# Patient Record
(demographics unavailable — no encounter records)

---
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
## 2018-09-20 NOTE — PHYS DOC
Past Medical History


Past Medical History:  Diabetes-Type I


Additional Past Medical Histor:  DIABETIC RIGHT FOOT ULCER


Past Surgical History:  Other


Additional Past Surgical Histo:  right rotator cuff, right ankle, right foot 

ulcer


Alcohol Use:  Occasionally


Drug Use:  None





Adult General


Chief Complaint


Chief Complaint:  FOOT INJURY PAIN





[facility]


HPI





Patient is a 39 year old  male who presents to the ED for wound 

evaluation. He states that his right foot started throbbing about 6 days ago. 

At the time, he had been taking gabapentin for nerve pain. He stopped taking 

the gabapentin and the throbbing stopped. He came to the ED to get his foot 

evaluated "just in case". He has a history of prior foot infection of his right 

1st toe and the plantar aspect of his foot. He states he is able to go and get 

wound care once a week. He states his blood sugar levels run around mid-200s. 

He currently denies any symptoms.





Review of Systems


Review of Systems





Constitutional: Denies fever or chills


Eyes: Denies change in visual acuity, redness, or eye pain


HENT: Denies nasal congestion or sore throat


Respiratory: Denies cough or shortness of breath


Cardiovascular: No additional information not addressed in HPI


GI: Denies abdominal pain, nausea, vomiting, diarrhea, constipation


: Denies dysuria or hematuria


Musculoskeletal: Denies back pain; Notes slight right foot pain


Integument: Denies rash or skin lesions


Neurologic: Denies headache, focal weakness or sensory changes





Complete systems were reviewed and found to be within normal limits, except as 

documented in this note.





Family History


Family History


Non-contributory





Allergies


Allergies





Allergies








Coded Allergies Type Severity Reaction Last Updated Verified


 


  piperacillin Allergy Intermediate  4/14/18 Yes


 


  tazobactam Allergy Intermediate  4/14/18 Yes











Physical Exam


Physical Exam





Constitutional: Well developed, well nourished, no acute distress, non-toxic 

appearance


HENT: Normocephalic, atraumatic, oropharynx moist, no oral exudates, nose normal


Eyes: Conjunctiva normal, no discharge


Neck: Normal range of motion, no tenderness, supple, no meningismus


Cardiovascular: Heart rate regular rhythm, no murmur


Lungs & Thorax:  Bilateral breath sounds clear to auscultation


Abdomen: Soft, no tenderness


Skin: Warm, dry, no erythema, no rash


Back: No tenderness, no CVA tenderness


Extremities: No tenderness, ROM intact, healing wound on right 1st toe and 

plantar aspect of right foot


Neurologic: Alert and oriented X 3, normal motor function, normal sensory 

function, no focal deficits noted


Psychologic: Affect normal, judgement normal, mood normal





Current Patient Data


Vital Signs





 Vital Signs








  Date Time  Temp Pulse Resp B/P (MAP) Pulse Ox O2 Delivery O2 Flow Rate FiO2


 


9/20/18 03:10 98.2 107 18 176/86 (116) 96 Room Air  





 98.2       











EKG


EKG


[]





Radiology/Procedures


Radiology/Procedures


[]





Course & Med Decision Making


Course & Med Decision Making





Patient is a 39 year old  male who presents to the ED for wound 

evaluation. States pain in his foot stopped when he discontinued his 

gabapentin. Pertinent labs and imaging studies obtained and reviewed (see chart 

for details). Recommended continued discontinuation of gabapentin. Patient 

provided with "post-op" shoe for patient comfort. He was also provided with a 

tramadol prescription for his pain symptoms. Recommended that he continue to 

see wound care once a week for his healing foot infection. Patient will also be 

provided with a note that states he is fit to return to work. Patient stable 

for discharge with outpatient follow-up with PCP. Discussed findings and plan 

with patient, who acknowledges understanding and agreement.





Dragon Disclaimer


Dragon Disclaimer


This electronic medical record was generated, in whole or in part, using a 

voice recognition dictation system.





Departure


Departure


Impression:  


 Primary Impression:  


 Encounter for evaluation of wound


Disposition:  01 HOME, SELF-CARE


Condition:  STABLE


Referrals:  


UNKNOWN PCP NAME (PCP)


Patient Instructions:  Wound Check


Scripts


Tramadol Hcl (TRAMADOL HCL) 50 Mg Tablet


50 MG PO Q6HRS PRN for PAIN, #14 TAB


   Take each tablet with one (1) regular strength Tylenol


   325mg.


   Prov: CHALINO SOTELO DO         9/20/18











CHALINO SOTELO DO Sep 20, 2018 03:54

## 2020-08-04 NOTE — EKG
[facility]

              [address] [place] [address]

Test Date:    2020               Test Time:    08:57:18

Pat Name:     CATALINO LANGE               Department:   

Patient ID:   [id number]           Room:          

Gender:       M                        Technician:   

:          1978               Requested By: JAVIER YODER

Order Number: [id number]           Reading MD:     

                                 Measurements

Intervals                              Axis          

Rate:         107                      P:            47

NV:           152                      QRS:          -20

QRSD:         84                       T:            58

QT:           328                                    

QTc:          443                                    

                           Interpretive Statements

SINUS TACHYCARDIA

LEFTWARD AXIS

R-S TRANSITION ZONE IN V LEADS DISPLACED TO THE LEFT

QRS(T) CONTOUR ABNORMALITY

CONSIDER ANTEROSEPTAL MYOCARDIAL DAMAGE

POSSIBLY ABNORMAL ECG

RI6.01

No previous ECG available for comparison

## 2020-08-04 NOTE — CONS
DATE OF CONSULTATION:  08/04/2020



PULMONARY CONSULTATION



ATTENDING PHYSICIAN:  Dr. Allen.



REASON FOR CONSULTATION:  COVID pneumonia.



HISTORY OF PRESENT ILLNESS:  The patient is a 41-year-old morbidly obese male

with a BMI of 51.  The patient has history of hypertension, diabetes and morbid

obesity.  He was brought into the hospital complaining of shortness of breath. 

This has been going on for 5 days.  He was tested positive for COVID.  He has a

cough, which has been nonproductive.  No headaches, no nausea, vomiting, no

diarrhea, no dysuria, no focal weakness.  He was seen in the Emergency Room, was

hospitalized.  He had a CTA chest.  The contrast was a poor as a result was

nondiagnostic for pulmonary embolism.  He did have multifocal ground glass

opacities bilaterally predominantly in the left upper and lower lobes and

concerning for COVID pneumonia.  He is currently requiring oxygen at 2 liters. 

His T-max is 100.2.  I have been asked to see him for further evaluation.



PAST MEDICAL HISTORY:  Obesity, diabetes, hypertension, diabetic foot ulcer,

rotator cuff surgery and ankle surgery.



PAST SURGICAL HISTORY:  As above.



ALLERGIES:  None.



FAMILY HISTORY:  Diabetes.



SOCIAL HISTORY:  He is an .  He does not drink or smoke

cigarettes.



MEDICATIONS:  Reviewed as listed in the MRAD.



ALLERGIES:  PIPERACILLIN AND TAZOBACTAM.



REVIEW OF SYSTEMS:  Twelve-point system obtained.  Pertinent positives discussed

in my history of present illness, otherwise noncontributory.  All systems that

were negative were reviewed as well.



FAMILY HISTORY:  Noncontributory to lungs.



PHYSICAL EXAMINATION:

VITAL SIGNS:  Reviewed.  His T-max is 100.2, pulse ox 92% on 2 liters, blood

pressure on the high side 181/91.

GENERAL:  He is in no obvious respiratory distress.

SKIN:  With no rash.  Visual exam done via telemedicine.



LABORATORY DATA:  Reviewed.  White cell count 7.1, hemoglobin 13.3 and platelets

are 149.  BUN 25, creatinine 1.7.



IMPRESSION:

1.  Acute hypoxic respiratory failure secondary to COVID-19 pneumonia.

2.  Abnormal CT chest consistent with pneumonia with ground glass opacities

bilaterally, poor contrast was received as a result pulmonary embolism could not

be ruled out, but he has low clinical suspicion for pulmonary embolism and as

such no further investigation is needed.

3.  Underlying morbid obesity.

4.  Diabetes.

5.  Hypertension.



RECOMMENDATIONS:

1.  Continue with present oxygen and keep saturations 92-94.

2.  Infectious Disease has been consulted for antibiotics.  HE IS ALLERGIC TO

PIPERACILLIN AND TAZOBACTAM.

3.  Continue IV steroids due to hypoxia.

4.  Weight loss is advised.

5.  We will monitor the clinical course of COVID pneumonia.

6.  Discussed with RN.  We will follow along with you.

 



______________________________

AZAM REAGAN MD



DR:  DALLIN/madison  JOB#:  [id number] / [id number]

DD:  08/04/2020 19:59  DT:  08/04/2020 20:15

## 2020-08-04 NOTE — NUR
Pt arrived on unit from ED by wheelchair at 1440. Pt currently on 2L NC, having no pain at 
this time. Pt requesting water, fresh pitcher given. No additional concerns. Will assume 
care of this pt and monitor closely.

## 2020-08-04 NOTE — HP
ADMIT DATE:  08/04/2020



CHIEF COMPLAINT:  Shortness of breath.



HISTORY OF PRESENT ILLNESS:  The patient is a pleasant middle-aged

-American male who is an .  Basically, he was checked

for COVID about 5 days ago and apparently he is positive.  He has been having

increasing shortness of breath and fatigue.  His symptoms are worse with

exertion, better with rest.  He has had some blood-streaked sputum.  His

appetite is decreased.  I discussed the case with ER physician.  The patient is

now hypoxic.  We are going to admit the patient and consult Pulmonary Medicine,

Infectious Disease.



PAST MEDICAL HISTORY:  Obesity, diabetes, hypertension, diabetic foot ulcer,

rotator cuff surgery, ankle surgery.



ALLERGIES:  None.



FAMILY HISTORY:  Diabetes.



SOCIAL HISTORY:  He is an .  He does not drink, smoke or take

drugs.



MEDICATIONS:  Reviewed, please refer to the MRAD.



REVIEW OF SYSTEMS:  

GENERAL:  No history of weight change, weakness or fevers.

SKIN:  No bruising, hair changes or rashes.

EYES:  No blurred, double or loss of vision.

NOSE AND THROAT:  No history of nosebleeds, hoarseness or sore throat.

HEART:  No history of palpitations, chest pain or shortness of breath on

exertion.

LUNGS:  He complains of shortness of breath and cough.

GASTROINTESTINAL:  Denies changes in appetite, nausea, vomiting, diarrhea or

constipation.

GENITOURINARY:  No history of frequency, urgency, hesitancy or nocturia.

NEUROLOGIC:  Denies history of numbness, tingling, tremor or weakness.

PSYCHIATRIC:  No history of panic, anxiety or depression.

ENDOCRINE:  No history of heat or cold intolerance, polyuria or polydipsia.

EXTREMITIES:  Denies muscle weakness, joint pain, pain on walking or stiffness.

 

PHYSICAL EXAMINATION:

VITALS:  Within normal limits and are stable.

GENERAL:  No apparent distress.  Alert and oriented.

HEENT:  Normal cephalic atraumatic, external auditory canals are patent

EYES:  Extraocular muscles are intact, pupils are equally round and reactive to

light and accommodation

MUSCULOSKELETAL:  Well developed, well nourished, good range of motion

ENDOCRINE:  No thyromegaly was palpated

LYMPHATICS:  No cervical chain or axillary nodes were noted

HEMATOPOIETIC:  No bruising

NECK:  Supple, no JVD, no thyromegaly was noted.

LUNGS:  He has got decreased breath sounds with some fine crackles.

HEART:  RRR, S1, S2 present.  Peripheral pulses intact, no obvious murmurs were

noted.

ABDOMEN:  Soft, nontender.  Positive bowel sounds no organomegaly, normal bowel

sounds.

EXTREMITIES:  Without any cyanosis, clubbing, or edema.  Pedal pulses intact,

Homans sign is negative.

NEUROLOGIC:  Normal speech, normal tone.  A & O x3, moves all extremities, no

obvious focal deficits.

PSYCHIATRIC:  Normal affect, normal mood.  Stable.

SKIN:  No ulcerations or rashes, good skin turgor, no jaundice.

VASCULAR:  Good capillary refill, neurovascular bundle appears to be intact.



LABORATORY DATA:  Pending.  CT of the chest did not show any obvious PE, but

apparently the timing was a little off with a bolus of contrast.



ASSESSMENT AND PLAN:  COVID-19.  The patient will be admitted to the COVID unit.

 Respiratory isolation, IV steroids, IV antibiotics,  albuterol, O2 per nasal

cannula.  Home meds, DVT prophylaxis.  Consult Pulmonary, consult Infectious

Disease.  Trend labs.

 



______________________________

JAIRO BUSCH DO



DR:  MIRIAM/madison  JOB#:  [id number] / [id number]

DD:  08/04/2020 15:45  DT:  08/04/2020 16:05

## 2020-08-04 NOTE — NUR
This RN received telephone orders from Dr. Franco for pt's elevated BP, elevated 
temperature, and insulin orders. Refer to orders for details.

## 2020-08-04 NOTE — RAD
CHEST AP ONLY

 

History: Reason: sob COVID + / Spl. Instructions:  / History: 

 

Comparison: July 29, 2020

 

Findings:

Multifocal consolidations bilaterally, left greater than right. No pleural

effusion. No pneumothorax. Normal heart size.

 

Impression: 

1.  Multifocal consolidations bilaterally, left greater than right. 

Differential considerations include infectious process such as viral 

pneumonia, ARDS or asymmetric pulmonary edema.

 

Electronically signed by: Carlyle Miranda DO (8/4/2020 11:04 AM) [facility]

## 2020-08-04 NOTE — PHYS DOC
Past Medical History


Past Medical History:  Diabetes-Type II, Hypertension


Additional Past Medical Histor:  DIABETIC RIGHT FOOT ULCER


Past Surgical History:  Other


Additional Past Surgical Histo:  RIGHT FOOT, ankle, rotator cuff


Smoking Status:  Never Smoker


Alcohol Use:  Occasionally


Drug Use:  None





General Adult


EDM:


Chief Complaint:  SHORTNESS OF BREATH





HPI:


HPI:





The history was obtained from the patient.  Patient is a 41-year-old male with 

PMH obesity, insulin-dependent diabetes who presents with a chief complaint of 

generalized fatigue and shortness of breath.  Patient states he was diagnosed 

with coronavirus approximately 5 days ago.  He states he is felt increasingly 

fatigued since then.  He does note some shortness of breath at rest and with 

exertion.  He denies any chest pain.  He does note a productive cough.  He does 

note some blood-streaked sputum.  He notes a decrease in appetite.  He denies 

any vomiting.  He states that he thinks he may be dehydrated.  He denies any 

history of sleep apnea.  He states blood sugars have been running approximately 

200.  He states this is not unusual for him however.  He denies any neck pain or

stiffness.  He denies any syncope.  He does not use oxygen at home.  No other 

complaints.





Review of Systems:


Review of Systems:


Constitutional:   Positive for fevers and chills, fatigue


Eyes:   Denies change in visual acuity. []


HENT:   Denies nasal congestion or sore throat. [] 


Respiratory:   Positive for cough, hemoptysis, shortness of breath


Cardiovascular:   Denies chest pain or edema. [] 


GI:   Denies abdominal pain, nausea, vomiting, bloody stools or diarrhea. [] 


:  Denies dysuria. [] 


Musculoskeletal:   Denies back pain or joint pain. [] 


Integument:   Denies rash. [] 


Neurologic:   Denies headache, focal weakness or sensory changes. [] 


Endocrine:   Denies polyuria or polydipsia. [] 


Lymphatic:  Denies swollen glands. [] 


Psychiatric:  Denies depression or anxiety. []





Heart Score:


Risk Factors:


Risk Factors:  DM, Current or recent (<one month) smoker, HTN, HLP, family 

history of CAD, obesity.


Risk Scores:


Score 0 - 3:  2.5% MACE over next 6 weeks - Discharge Home


Score 4 - 6:  20.3% MACE over next 6 weeks - Admit for Clinical Observation


Score 7 - 10:  72.7% MACE over next 6 weeks - Early Invasive Strategies





Current Medications:





Current Medications








 Medications


  (Trade)  Dose


 Ordered  Sig/Toni  Start Time


 Stop Time Status Last Admin


Dose Admin


 


 Acetaminophen


  (Tylenol)  1,000 mg  1X  ONCE  20 08:45


 20 08:46 UNV  





 


 Ondansetron HCl


  (Zofran)  4 mg  1X  ONCE  20 08:45


 20 08:46 UNV  





 


 Sodium Chloride  1,000 ml @ 


 1,000 mls/hr  1X  ONCE  20 08:45


 20 09:44 UNV  














Allergies:


Allergies:





Allergies








Coded Allergies Type Severity Reaction Last Updated Verified


 


  piperacillin Allergy Intermediate K 20 Yes


 


  tazobactam Allergy Intermediate K 20 Yes











Physical Exam:


PE:





Constitutional: Well developed, well nourished, no acute distress, non-toxic 

appearance. []


HENT: Normocephalic, atraumatic, bilateral external ears normal, oropharynx 

moist, no oral exudates, nose normal. []


Eyes: PERRLA, EOMI, conjunctiva normal, no discharge. [] 


Neck: Normal range of motion, no tenderness, supple, no stridor. [] 


Cardiovascular:Heart rate regular rhythm, no murmur []


Lungs & Thorax:  Bilateral breath sounds clear to auscultation []


Abdomen:  soft, no tenderness, no masses, no pulsatile masses. [] 


Skin: Warm, dry, no erythema, no rash. [] 


Back: No tenderness, no CVA tenderness. [] 


Extremities: No tenderness, no cyanosis, no clubbing, ROM intact, no edema. [] 


Neurologic: Alert and oriented X 3, normal motor function, normal sensory 

function, no focal deficits noted. []


Psychologic: Affect normal, judgement normal, mood normal. []





Current Patient Data:


Labs:





Laboratory Tests








Test


 20


09:15


 


White Blood Count 7.1 x10^3/uL 


 


Red Blood Count 5.03 x10^6/uL 


 


Hemoglobin 13.3 g/dL 


 


Hematocrit 39.7 % 


 


Mean Corpuscular Volume 79 fL 


 


Mean Corpuscular Hemoglobin 26 pg 


 


Mean Corpuscular Hemoglobin


Concent 33 g/dL 





 


Red Cell Distribution Width 14.4 % 


 


Platelet Count 149 x10^3/uL 


 


Neutrophils (%) (Auto) 83 % 


 


Lymphocytes (%) (Auto) 11 % 


 


Monocytes (%) (Auto) 6 % 


 


Eosinophils (%) (Auto) 0 % 


 


Basophils (%) (Auto) 0 % 


 


Neutrophils # (Auto) 5.9 x10^3/uL 


 


Lymphocytes # (Auto) 0.8 x10^3/uL 


 


Monocytes # (Auto) 0.4 x10^3/uL 


 


Eosinophils # (Auto) 0.0 x10^3/uL 


 


Basophils # (Auto) 0.0 x10^3/uL 


 


Prothrombin Time 12.2 SEC 


 


Prothromb Time International


Ratio 0.9 





 


Sodium Level 130 mmol/L 


 


Potassium Level 3.6 mmol/L 


 


Chloride Level 94 mmol/L 


 


Carbon Dioxide Level 30 mmol/L 


 


Anion Gap 6 


 


Blood Urea Nitrogen 25 mg/dL 


 


Creatinine 1.7 mg/dL 


 


Estimated GFR


(Cockcroft-Gault) 54.0 





 


Glucose Level 216 mg/dL 


 


Calcium Level 7.7 mg/dL 


 


Troponin I Quantitative < 0.017 ng/mL 


 


NT-Pro-B-Type Natriuretic


Peptide 104 pg/mL 











Current Medications








 Medications


  (Trade)  Dose


 Ordered  Sig/Toni


 Route


 PRN Reason  Start Time


 Stop Time Status Last Admin


Dose Admin


 


 Sodium Chloride  1,000 ml @ 


 1,000 mls/hr  1X  ONCE


 IV


   20 08:45


 20 09:44 DC 20 09:21





 


 Ondansetron HCl


  (Zofran)  4 mg  1X  ONCE


 IVP


   20 08:45


 20 08:47 DC 20 09:21





 


 Acetaminophen


  (Tylenol)  1,000 mg  1X  ONCE


 PO


   20 08:45


 20 08:47 DC 20 09:21





 


 Iohexol


  (Omnipaque 350


 Mg/ml)  80 ml  1X  ONCE


 IV


   20 10:15


 20 10:16 DC 20 10:30





 


 Info


  (CONTRAST GIVEN


 -- Rx MONITORING)  1 each  PRN DAILY  PRN


 MC


 SEE COMMENTS  20 10:15


 20 10:14   





 


 Ondansetron HCl


  (Zofran)  4 mg  PRN Q8HRS  PRN


 IV


 NAUSEA/VOMITING  20 11:45


 20 11:44   





 


 Methylprednisolone


 Sodium Succinate


  (SOLU-Medrol


 40MG VIAL)  40 mg  BID


 IM


   8/4/20 21:00


     





 


 Multivitamins


  (Thera M Plus)  1 tab  DAILY


 PO


   20 09:00


     











Vital Signs:





Vital Signs








  Date Time  Temp Pulse Resp B/P (MAP) Pulse Ox O2 Delivery O2 Flow Rate FiO2


 


20 11:21  103  144/62 (89) 94 Nasal Cannula 2.0 


 


20 09:51   18     


 


20 08:45 98.8       





 98.8       











EKG:


EKG:


[] EKG consistent with sinus tachycardia.  Ventricular rate of 107 bpm.  Left 

axis noted.  Intervals normal.  No acute ischemic changes appreciated.  No 

previous EKG for comparison.





Radiology/Procedures:


Radiology/Procedures:


[][facility]


                    [address]  [place], [place] [id number] [phone]


                                        


                                 IMAGING REPORT





                                     Signed





PATIENT: CATALINO LANGECCOUNT: [id number]     MRN#: [id number]


: 1978           LOCATION: ER              AGE: 41


SEX: M                    EXAM DT: 20         ACCESSION#: [id number]


STATUS: REG ER            ORD. PHYSICIAN: JAVIRE YODER DO


REASON: hemoptysis. r/o PE


PROCEDURE: CT ANGIOGRAPHY CHEST





CT ANGIOGRAPHY CHEST


 


History: Hemoptysis


 


Technique: CT of the chest was performed with intravenous contrast. PE 


protocol. Maximum intensity projection coronal and sagittal 


reconstructions were performed.


 


Exposure: One or more of the following individualized dose reduction 


techniques were utilized for this examination:  


1. Automated exposure control  


2. Adjustment of the mA and/or kV according to patient size  


3. Use of iterative reconstruction technique.


 


Comparison: None


 


Findings: 


Chest: Evaluation for pulmonary emboli is significantly degraded by 


contrast bolus timing. No definite large central pulmonary embolus. 


Calcified and hilar lymph nodes, likely prior granulomatous disease.


 


Bilateral groundglass opacities involving all lobes most prominent within 


the left upper and lower lobes. No pleural effusion. No pneumothorax.


 


Upper abdomen: The imaged upper abdomen is unremarkable.


 


Bones: No pathologic osseous lesions.


 


Impression:


1.  Essentially nondiagnostic study for evaluation of pulmonary embolism 


due to contrast bolus timing. If persistent clinical concern, repeat CT 


angiogram or VQ scan can better assess.


2.  Multifocal ground glass opacities bilaterally predominantly within the


left upper and lower lobes, concerning for infection such as viral 


pneumonia, ARDS or asymmetric pulmonary edema. Recommend follow-up to 


ensure resolution.


 


Electronically signed by: Carlyle Miranda DO (2020 10:49 AM) [facility]














DICTATED and SIGNED BY:     CARLYLE MIRANDA DO


DATE:     20 1049





Course & Med Decision Making:


Course & Med Decision Making


Pertinent Labs and Imaging studies reviewed. (See chart for details)





Patient is a 41-year-old male who presents with chief complaint of increased 

shortness of breath and fatigue.  Initial vital signs notable for oxygenation of

 approximately 92% on room air.  He does appear conversationally dyspneic.  

Basic labs were obtained and were grossly unremarkable.  While in the emergency 

department his oxygen level did drop to 84% requiring nasal cannula.  Given the 

association with thromboembolic disease with coronavirus CT PE study was 

obtained.  Unfortunately due to poor contrast timing the pulmonary vasculature 

was not able to be thoroughly evaluated for pulmonary embolism.  However he does

 have diffuse airspace disease concerning for viral pneumonia.  Given he is 

requiring nasal cannula he will be hospitalized.  Heparinization will be 

deferred at this time.  Signout given to hospitalist.





Thomas Disclaimer:


Dragon Disclaimer:


This electronic medical record was generated, in whole or in part, using a voice

 recognition dictation system.





Departure


Departure


Disposition:   ADMITTED AS INPATIENT


Condition:  STABLE


Referrals:  


UNKNOWN PCP NAME (PCP)





Justicifation of Admission Dx:


Justifications for Admission:


Justification of Admission Dx:  Yes


Respiratory Failure:  Non-Cardiac Pulm Edema











JAVIER YODER DO           Aug 4, 2020 08:49

## 2020-08-04 NOTE — NUR
Pt's BP elevated on admission. Recheck of /97. This RN paged Dr. Allen for orders. 
Will await callback and monitor pt.

## 2020-08-04 NOTE — RAD
CT ANGIOGRAPHY CHEST

 

History: Hemoptysis

 

Technique: CT of the chest was performed with intravenous contrast. PE 

protocol. Maximum intensity projection coronal and sagittal 

reconstructions were performed.

 

Exposure: One or more of the following individualized dose reduction 

techniques were utilized for this examination:  

1. Automated exposure control  

2. Adjustment of the mA and/or kV according to patient size  

3. Use of iterative reconstruction technique.

 

Comparison: None

 

Findings: 

Chest: Evaluation for pulmonary emboli is significantly degraded by 

contrast bolus timing. No definite large central pulmonary embolus. 

Calcified and hilar lymph nodes, likely prior granulomatous disease.

 

Bilateral groundglass opacities involving all lobes most prominent within 

the left upper and lower lobes. No pleural effusion. No pneumothorax.

 

Upper abdomen: The imaged upper abdomen is unremarkable.

 

Bones: No pathologic osseous lesions.

 

Impression:

1.  Essentially nondiagnostic study for evaluation of pulmonary embolism 

due to contrast bolus timing. If persistent clinical concern, repeat CT 

angiogram or VQ scan can better assess.

2.  Multifocal ground glass opacities bilaterally predominantly within the

left upper and lower lobes, concerning for infection such as viral 

pneumonia, ARDS or asymmetric pulmonary edema. Recommend follow-up to 

ensure resolution.

 

Electronically signed by: Carlyle Miranda DO (8/4/2020 10:49 AM) [facility]

## 2020-08-05 NOTE — NUR
This RN received orders from Dr. Franco to give 45 units of Humalog x1. Will administer and 
recheck FSBS.

## 2020-08-05 NOTE — NUR
This RN had a conversation with pt in regards to receiving convalescent plasma. This 
conversation included, but was not limited to why it is being used for COVID pts, the 
possible risks, and benefits. The pt stated "I will have to think about that and talk to my 
sister." Consents not signed at this time. This RN will follow-up with pt in regards to 
decision.

## 2020-08-05 NOTE — PDOC
PULMONARY PROGRESS NOTES


DATE: 8/5/20 


TIME: 11:03


Subjective


no soa


on 2 litres


Vitals





Vital Signs








  Date Time  Temp Pulse Resp B/P (MAP) Pulse Ox O2 Delivery O2 Flow Rate FiO2


 


8/5/20 08:06      Nasal Cannula 4.0 


 


8/5/20 07:00 100.1 99 20 152/84 (106) 91   





 100.1       








General:  Alert, No acute distress


Lungs:  Clear


Cardiovascular:  S1


Abdomen:  Soft


Neuro Exam:  Alert


Extremities:  No Edema


Skin:  Warm


Labs





Laboratory Tests








Test


 8/4/20


09:15 8/4/20


18:23 8/4/20


21:36 8/5/20


03:47


 


White Blood Count


 7.1 x10^3/uL


(4.0-11.0) 


 


 





 


Red Blood Count


 5.03 x10^6/uL


(4.30-5.70) 


 


 





 


Hemoglobin


 13.3 g/dL


(13.0-17.5) 


 


 





 


Hematocrit


 39.7 %


(39.0-53.0) 


 


 





 


Mean Corpuscular Volume 79 fL ()    


 


Mean Corpuscular Hemoglobin 26 pg (25-35)    


 


Mean Corpuscular Hemoglobin


Concent 33 g/dL


(31-37) 


 


 





 


Red Cell Distribution Width


 14.4 %


(11.5-14.5) 


 


 





 


Platelet Count


 149 x10^3/uL


(140-400) 


 


 





 


Neutrophils (%) (Auto) 83 % (31-73)    


 


Lymphocytes (%) (Auto) 11 % (24-48)    


 


Monocytes (%) (Auto) 6 % (0-9)    


 


Eosinophils (%) (Auto) 0 % (0-3)    


 


Basophils (%) (Auto) 0 % (0-3)    


 


Neutrophils # (Auto)


 5.9 x10^3/uL


(1.8-7.7) 


 


 





 


Lymphocytes # (Auto)


 0.8 x10^3/uL


(1.0-4.8) 


 


 





 


Monocytes # (Auto)


 0.4 x10^3/uL


(0.0-1.1) 


 


 





 


Eosinophils # (Auto)


 0.0 x10^3/uL


(0.0-0.7) 


 


 





 


Basophils # (Auto)


 0.0 x10^3/uL


(0.0-0.2) 


 


 





 


Prothrombin Time


 12.2 SEC


(11.7-14.0) 


 


 





 


Prothromb Time International


Ratio 0.9 (0.8-1.1) 


 


 


 





 


Sodium Level


 130 mmol/L


(136-145) 


 


 





 


Potassium Level


 3.6 mmol/L


(3.5-5.1) 


 


 





 


Chloride Level


 94 mmol/L


() 


 


 





 


Carbon Dioxide Level


 30 mmol/L


(21-32) 


 


 





 


Anion Gap 6 (6-14)    


 


Blood Urea Nitrogen


 25 mg/dL


(8-26) 


 


 





 


Creatinine


 1.7 mg/dL


(0.7-1.3) 


 


 





 


Estimated GFR


(Cockcroft-Gault) 54.0 


 


 


 





 


Glucose Level


 216 mg/dL


(70-99) 


 


 





 


Calcium Level


 7.7 mg/dL


(8.5-10.1) 


 


 





 


Troponin I Quantitative


 < 0.017 ng/mL


(0.000-0.055) 


 


 





 


NT-Pro-B-Type Natriuretic


Peptide 104 pg/mL


(0-124) 


 


 





 


Glucose (Fingerstick)


 


 308 mg/dL


(70-99) 237 mg/dL


(70-99) 519 mg/dL


(70-99)


 


Test


 8/5/20


06:00 8/5/20


07:23 8/5/20


08:25 





 


White Blood Count


 6.1 x10^3/uL


(4.0-11.0) 


 


 





 


Red Blood Count


 4.89 x10^6/uL


(4.30-5.70) 


 


 





 


Hemoglobin


 12.8 g/dL


(13.0-17.5) 


 


 





 


Hematocrit


 39.4 %


(39.0-53.0) 


 


 





 


Mean Corpuscular Volume 81 fL ()    


 


Mean Corpuscular Hemoglobin 26 pg (25-35)    


 


Mean Corpuscular Hemoglobin


Concent 33 g/dL


(31-37) 


 


 





 


Red Cell Distribution Width


 14.2 %


(11.5-14.5) 


 


 





 


Platelet Count


 186 x10^3/uL


(140-400) 


 


 





 


Neutrophils (%) (Auto) 86 % (31-73)    


 


Lymphocytes (%) (Auto) 10 % (24-48)    


 


Monocytes (%) (Auto) 5 % (0-9)    


 


Eosinophils (%) (Auto) 0 % (0-3)    


 


Basophils (%) (Auto) 0 % (0-3)    


 


Neutrophils # (Auto)


 5.2 x10^3/uL


(1.8-7.7) 


 


 





 


Lymphocytes # (Auto)


 0.6 x10^3/uL


(1.0-4.8) 


 


 





 


Monocytes # (Auto)


 0.3 x10^3/uL


(0.0-1.1) 


 


 





 


Eosinophils # (Auto)


 0.0 x10^3/uL


(0.0-0.7) 


 


 





 


Basophils # (Auto)


 0.0 x10^3/uL


(0.0-0.2) 


 


 





 


Sodium Level


 126 mmol/L


(136-145) 


 


 





 


Potassium Level


 4.7 mmol/L


(3.5-5.1) 


 


 





 


Chloride Level


 91 mmol/L


() 


 


 





 


Carbon Dioxide Level


 24 mmol/L


(21-32) 


 


 





 


Anion Gap 11 (6-14)    


 


Blood Urea Nitrogen


 28 mg/dL


(8-26) 


 


 





 


Creatinine


 1.8 mg/dL


(0.7-1.3) 


 


 





 


Estimated GFR


(Cockcroft-Gault) 50.6 


 


 


 





 


Glucose Level


 448 mg/dL


(70-99) 


 


 





 


Calcium Level


 7.6 mg/dL


(8.5-10.1) 


 


 





 


Glucose (Fingerstick)


 


 427 mg/dL


(70-99) 394 mg/dL


(70-99) 











Laboratory Tests








Test


 8/4/20


18:23 8/4/20


21:36 8/5/20


03:47 8/5/20


06:00


 


Glucose (Fingerstick)


 308 mg/dL


(70-99) 237 mg/dL


(70-99) 519 mg/dL


(70-99) 





 


White Blood Count


 


 


 


 6.1 x10^3/uL


(4.0-11.0)


 


Red Blood Count


 


 


 


 4.89 x10^6/uL


(4.30-5.70)


 


Hemoglobin


 


 


 


 12.8 g/dL


(13.0-17.5)


 


Hematocrit


 


 


 


 39.4 %


(39.0-53.0)


 


Mean Corpuscular Volume    81 fL () 


 


Mean Corpuscular Hemoglobin    26 pg (25-35) 


 


Mean Corpuscular Hemoglobin


Concent 


 


 


 33 g/dL


(31-37)


 


Red Cell Distribution Width


 


 


 


 14.2 %


(11.5-14.5)


 


Platelet Count


 


 


 


 186 x10^3/uL


(140-400)


 


Neutrophils (%) (Auto)    86 % (31-73) 


 


Lymphocytes (%) (Auto)    10 % (24-48) 


 


Monocytes (%) (Auto)    5 % (0-9) 


 


Eosinophils (%) (Auto)    0 % (0-3) 


 


Basophils (%) (Auto)    0 % (0-3) 


 


Neutrophils # (Auto)


 


 


 


 5.2 x10^3/uL


(1.8-7.7)


 


Lymphocytes # (Auto)


 


 


 


 0.6 x10^3/uL


(1.0-4.8)


 


Monocytes # (Auto)


 


 


 


 0.3 x10^3/uL


(0.0-1.1)


 


Eosinophils # (Auto)


 


 


 


 0.0 x10^3/uL


(0.0-0.7)


 


Basophils # (Auto)


 


 


 


 0.0 x10^3/uL


(0.0-0.2)


 


Sodium Level


 


 


 


 126 mmol/L


(136-145)


 


Potassium Level


 


 


 


 4.7 mmol/L


(3.5-5.1)


 


Chloride Level


 


 


 


 91 mmol/L


()


 


Carbon Dioxide Level


 


 


 


 24 mmol/L


(21-32)


 


Anion Gap    11 (6-14) 


 


Blood Urea Nitrogen


 


 


 


 28 mg/dL


(8-26)


 


Creatinine


 


 


 


 1.8 mg/dL


(0.7-1.3)


 


Estimated GFR


(Cockcroft-Gault) 


 


 


 50.6 





 


Glucose Level


 


 


 


 448 mg/dL


(70-99)


 


Calcium Level


 


 


 


 7.6 mg/dL


(8.5-10.1)


 


Test


 8/5/20


07:23 8/5/20


08:25 


 





 


Glucose (Fingerstick)


 427 mg/dL


(70-99) 394 mg/dL


(70-99) 


 











Medications





Active Scripts








 Medications  Dose


 Route/Sig


 Max Daily Dose Days Date Category


 


 Levemir (Insulin


 Detemir) 100


 Unit/1 Ml Vial  65 Unit


 SQ BID


   8/4/20 Reported


 


 Novolog (Insulin


 Aspart) 100


 Unit/1 Ml Vial  34 Unit


 SQ TID


   8/4/20 Reported


 


 Hydrochlorothiazide


 Tablet


  (Hydrochlorothiazide)


 12.5 Mg Tablet  12.5 Mg


 PO DAILY


   3/15/20 Rx











Impression


.


1.  Acute hypoxic respiratory failure secondary to COVID-19 pneumonia.


2.  Abnormal CT chest consistent with pneumonia with ground glass opacities


bilaterally, poor contrast was received as a result pulmonary embolism could not


be ruled out, but he has low clinical suspicion for pulmonary embolism and as


such no further investigation is needed.


3.  Underlying morbid obesity.


4.  Diabetes.


5.  Hypertension.





Plan


.





1.  Continue with present oxygen and keep saturations 92-94.


2.  abx per ID if needed.( not on any at present)


3.  Continue IV steroids due to hypoxia.


4.  Weight loss is advised.


5.  We will monitor the clinical course of COVID pneumonia.


6.  Discussed with RN.  We will follow along with you.











AZAM REAGAN MD                  Aug 5, 2020 11:05

## 2020-08-05 NOTE — NUR
SW following. Spoke with RN and reviewed chart. Pt from home. SW attempted to call into pt's 
room but there was no answer. Pt on 4l 02. COOKIE not sure if pt has home 02. Pt on IV 
Solu-Medrol. SHARON Tang to follow this patient for discharge planning.

## 2020-08-05 NOTE — PDOC
[facility] PROGRESS NOTE


Date of Service


DOS:


DATE: 8/5/20 


TIME: 11:14





Chief Complaint


Chief Complaint


1.  Acute hypoxic respiratory failure secondary to COVID-19 pneumonia.


2.  Abnormal CT chest consistent with pneumonia with ground glass opacities


bilaterally, poor contrast was received as a result pulmonary embolism could not


be ruled out, but he has low clinical suspicion for pulmonary embolism and as


such no further investigation is needed.


3.  Underlying morbid obesity.


4.  Diabetes.


5.  Hypertension.





History of Present Illness


History of Present Illness


8/5/2020


Patient is seen and examined


Chats reviewed


Discussed with RN





Vitals/I&O


Vitals/I&O:





                                   Vital Signs








  Date Time  Temp Pulse Resp B/P (MAP) Pulse Ox O2 Delivery O2 Flow Rate FiO2


 


8/5/20 08:06      Nasal Cannula 4.0 


 


8/5/20 07:00 100.1 99 20 152/84 (106) 91   





 100.1       














                                    I & O   


 


 8/4/20 8/4/20 8/5/20





 15:00 23:00 07:00


 


Intake Total 1000 ml 200 ml 1700 ml


 


Output Total   300 ml


 


Balance 1000 ml 200 ml 1400 ml











Physical Exam


General:  Alert, mild distress


Heart:  Regular rate, Normal S1, Normal S2, No murmurs


Lungs:  Crackles, Other (decreased bs)


Abdomen:  Normal bowel sounds, No masses


Extremities:  Normal pulses, No tenderness/swelling


Skin:  No significant lesion





Labs


Labs:





Laboratory Tests








Test


 8/4/20


18:23 8/4/20


21:36 8/5/20


03:47 8/5/20


06:00


 


Glucose (Fingerstick)


 308 mg/dL


(70-99) 237 mg/dL


(70-99) 519 mg/dL


(70-99) 





 


White Blood Count


 


 


 


 6.1 x10^3/uL


(4.0-11.0)


 


Red Blood Count


 


 


 


 4.89 x10^6/uL


(4.30-5.70)


 


Hemoglobin


 


 


 


 12.8 g/dL


(13.0-17.5)


 


Hematocrit


 


 


 


 39.4 %


(39.0-53.0)


 


Mean Corpuscular Volume    81 fL () 


 


Mean Corpuscular Hemoglobin    26 pg (25-35) 


 


Mean Corpuscular Hemoglobin


Concent 


 


 


 33 g/dL


(31-37)


 


Red Cell Distribution Width


 


 


 


 14.2 %


(11.5-14.5)


 


Platelet Count


 


 


 


 186 x10^3/uL


(140-400)


 


Neutrophils (%) (Auto)    86 % (31-73) 


 


Lymphocytes (%) (Auto)    10 % (24-48) 


 


Monocytes (%) (Auto)    5 % (0-9) 


 


Eosinophils (%) (Auto)    0 % (0-3) 


 


Basophils (%) (Auto)    0 % (0-3) 


 


Neutrophils # (Auto)


 


 


 


 5.2 x10^3/uL


(1.8-7.7)


 


Lymphocytes # (Auto)


 


 


 


 0.6 x10^3/uL


(1.0-4.8)


 


Monocytes # (Auto)


 


 


 


 0.3 x10^3/uL


(0.0-1.1)


 


Eosinophils # (Auto)


 


 


 


 0.0 x10^3/uL


(0.0-0.7)


 


Basophils # (Auto)


 


 


 


 0.0 x10^3/uL


(0.0-0.2)


 


Sodium Level


 


 


 


 126 mmol/L


(136-145)


 


Potassium Level


 


 


 


 4.7 mmol/L


(3.5-5.1)


 


Chloride Level


 


 


 


 91 mmol/L


()


 


Carbon Dioxide Level


 


 


 


 24 mmol/L


(21-32)


 


Anion Gap    11 (6-14) 


 


Blood Urea Nitrogen


 


 


 


 28 mg/dL


(8-26)


 


Creatinine


 


 


 


 1.8 mg/dL


(0.7-1.3)


 


Estimated GFR


(Cockcroft-Gault) 


 


 


 50.6 





 


Glucose Level


 


 


 


 448 mg/dL


(70-99)


 


Calcium Level


 


 


 


 7.6 mg/dL


(8.5-10.1)


 


Test


 8/5/20


07:23 8/5/20


08:25 


 





 


Glucose (Fingerstick)


 427 mg/dL


(70-99) 394 mg/dL


(70-99) 


 














Review of Systems


Review of Systems:


All systems are otherwise negative





Assessment and Plan


Assessmemt and Plan


Problems


Medical Problems:


(1) COVID-19 virus detected


Status: Acute  





Assessment


1.  Acute hypoxic respiratory failure secondary to COVID-19 pneumonia.


2.  Abnormal CT chest consistent with pneumonia with ground glass opacities


bilaterally, poor contrast was received as a result pulmonary embolism could not


be ruled out, but he has low clinical suspicion for pulmonary embolism and as


such no further investigation is needed.


3.  Underlying morbid obesity.


4.  Diabetes.


5.  Hypertension.





Plan


Respiratory isolation


Sliding scale insulin


IV steroids


IV antibiotics


O2 per nasal cannula


Home meds 


DVT prophylaxis.  


Trend labs (monitor glucose level)


Appreciate subspecialist input








Comment


Review of Relevant


I have reviewed the following items joseph (where applicable) has been applied.


Medications:





Current Medications








 Medications


  (Trade)  Dose


 Ordered  Sig/Toni


 Route


 PRN Reason  Start Time


 Stop Time Status Last Admin


Dose Admin


 


 Multivitamins


  (Thera M Plus)  1 tab  DAILY


 PO


   8/5/20 09:00


    8/5/20 10:30





 


 Insulin Human


 Lispro


  (HumaLOG)  34 units  TIDWMEALS


 SQ


   8/4/20 18:30


    8/5/20 04:59





 


 Insulin Glargine


  (Lantus Syringe)  65 unit  BID


 SQ


   8/4/20 21:00


    8/5/20 10:31





 


 Acetaminophen


  (Tylenol)  650 mg  PRN Q6HRS  PRN


 PO


 MILD PAIN / TEMP > 100.3'F  8/4/20 18:00


    8/5/20 05:00





 


 Insulin Human


 Lispro


  (HumaLOG)  0-9 UNITS  TIDWMEALS


 SQ


   8/5/20 08:00


    8/5/20 04:59





 


 Labetalol HCl


  (Normodyne Iv


 Push)  10 mg  PRN Q2HR  PRN


 IVP


 HYPERTENSION  8/4/20 18:00


    8/4/20 23:20





 


 Sodium Chloride  1,000 ml @ 


 100 mls/hr  Q10H ONCE


 IV


   8/4/20 20:00


 8/5/20 05:59 DC 8/4/20 21:32





 


 Methylprednisolone


 Sodium Succinate


  (SOLU-Medrol


 40MG VIAL)  40 mg  BID


 IV


   8/4/20 22:00


    8/5/20 10:30





 


 Insulin Human


 Lispro


  (HumaLOG)  45 units  1X  ONCE


 SQ


   8/5/20 08:00


 8/5/20 08:01 DC 8/5/20 07:57














Justicifation of Admission Dx:


Justifications for Admission:


Justification of Admission Dx:  Yes


Respiratory Failure:  Non-Cardiac Pulm Edema











JAIRO BUSCH III DO            Aug 5, 2020 11:16

## 2020-08-05 NOTE — NUR
This RN received orders from Dr. Allen by telephone to give pt 34 units Humalog scheduled, 
9 units per sliding scale, and start the first dose of Glyburide. Will administer and 
monitor pt.

## 2020-08-05 NOTE — CONS
DATE OF CONSULTATION:  08/05/2020



REQUESTING PHYSICIAN:  Dr. Allen.



REASON FOR CONSULTATION:  COVID-19 positive.



HISTORY OF PRESENT ILLNESS:  This is a 41-year-old -American gentleman

with obesity, hypertension, and diabetes, who came in with shortness of breath. 

The patient had been tested few days ago on 29th positive with COVID.  The

patient had been short of breath and hypoxic and running fever and has bilateral

infiltrates.  The patient denies any nausea, vomiting, or diarrhea.  The patient

is comfortable right now.  He denies any chest pain, headache, visual symptoms,

or abdominal pain.



PAST MEDICAL HISTORY:  Positive for diabetes mellitus, hypertension, and

obesity.



SOCIAL HISTORY:  Negative for smoking, alcohol, or illicit drug use.



ALLERGIES:  No known drug allergies.



CURRENT MEDICATIONS:  Reviewed.  The patient is on steroids.



ROS : as per HPI, rest neg.



PHYSICAL EXAMINATION:

GENERAL:  Alert and oriented gentleman, not in distress.

VITAL SIGNS:  Stable.  T-max 102.8.

HEENT:  NAD.

NECK:  Supple, no JVP, no lymphadenopathy.

LUNGS:  Clear.

HEART:  S1, S2 regular.

ABDOMEN:  Benign.

EXTREMITIES:  No edema or cyanosis.

SKIN:  Unremarkable.

NEUROLOGIC:  The patient is alert, awake, and appropriate.  No focal neurologic

deficit.



LABORATORY DATA:  White count is 6000.  BUN and creatinine are 28 and 1.8. 

Blood cultures on 29th were negative.  Chest CT showed bilateral ground glass

opacity.



IMPRESSION:

1.  Fever.

2.  COVID-19 positive.

3.  Bilateral pulmonary infiltrates.

4.  Diabetes.

5.  Hypertension.

6.  Obesity.



RECOMMENDATIONS:  Recommend continue supportive care, convalescent plasma and if

needed remdesivir, supportive care, and we will continue to follow.



Thank you very much, Dr. Allen, for giving me the opportunity to participate in

this patient's care.

 



______________________________

CALIXTO SALINAS MD



DR:  LYNNE/madison  JOB#:  [id number] / [id number]

DD:  08/05/2020 11:08  DT:  08/05/2020 11:20

BALDEMAR

## 2020-08-05 NOTE — NUR
Pt's FSBS 398 at lunch. Orders received by telephone from Dr. Allen to give scheduled 34 
units Humalog and 9 units per sliding scale, then start the pt on Glyburide 5 mg PO BID.

## 2020-08-05 NOTE — NUR
Pt's FSBS 427 after receiving 43 units of Humalog from nadeen Riojas RN. This RN sent a page 
to Dr. Franco. Will await orders.

## 2020-08-06 NOTE — PDOC
Infectious Disease Note


Subjective


Subjective


pt says he is not feeling bad, though o2 need has gone up and is being 

transfered to ICU


no fever,





ROS


ROS


no n/v/d/





Vital Sign


Vital Signs





Vital Signs








  Date Time  Temp Pulse Resp B/P (MAP) Pulse Ox O2 Delivery O2 Flow Rate FiO2


 


8/6/20 10:22  90  196/99    


 


8/6/20 08:00      Venturi Mask  


 


8/6/20 07:15 97.9  22  94  15.0 





 97.9       











Physical Exam


PHYSICAL EXAM


GENERAL:  Alert and oriented gentleman, not in distress.


VITAL SIGNS:  Stable.  


HEENT:  NAD.


NECK:  Supple, no JVP, no lymphadenopathy.


LUNGS:  Clear.


HEART:  S1, S2 regular.


ABDOMEN:  Benign.


EXTREMITIES:  No edema or cyanosis.


SKIN:  Unremarkable.


NEUROLOGIC:  The patient is alert, awake, and appropriate.  No focal neurologic


deficit.





Labs


Lab





Laboratory Tests








Test


 8/5/20


11:45 8/5/20


12:10 8/5/20


16:27 8/5/20


21:25


 


Glucose (Fingerstick)


 387 mg/dL


(70-99) 398 mg/dL


(70-99) 415 mg/dL


(70-99) 341 mg/dL


(70-99)


 


Test


 8/6/20


07:26 


 


 





 


Glucose (Fingerstick)


 330 mg/dL


(70-99) 


 


 














Objective


Assessment


IMPRESSION:


1.  Fever.


2.  COVID-19 positive.


3.  Bilateral pulmonary infiltrates.


4.  Diabetes.


5.  Hypertension.


6.  Obesity.





Plan


Plan of Care


pt is on steroids


refused plasma yesterday


need also remdesivir











CALIXTO SALINAS MD                Aug 6, 2020 11:19

## 2020-08-06 NOTE — NUR
Patient transfer to room 113 from the 6th floor. Placed on Vapotherm on arrival 30L/50%. 
Patient O2 sat 92%, alert and oriented, RR high 20's low 30's. Will continue to monitor.

## 2020-08-06 NOTE — PDOC
PULMONARY PROGRESS NOTES


DATE: 8/6/20 


TIME: 10:48


Subjective


Worsening hypoxia , requiring significant more oxygen requirements


reports increased SOB and cough 


afebrile at this time


Vitals





Vital Signs








  Date Time  Temp Pulse Resp B/P (MAP) Pulse Ox O2 Delivery O2 Flow Rate FiO2


 


8/6/20 10:22  90  196/99    


 


8/6/20 07:15 97.9  22  94 Simple Mask 15.0 





 97.9       








Comments


Visual exam preformed as Pt. seen during COVID-19 pandemic 


Tachycardia


NRB/ Ventimask 


SOB 


No edema 


Obese


Labs





Laboratory Tests








Test


 8/4/20


18:23 8/4/20


21:36 8/5/20


03:47 8/5/20


06:00


 


Glucose (Fingerstick)


 308 mg/dL


(70-99) 237 mg/dL


(70-99) 519 mg/dL


(70-99) 





 


White Blood Count


 


 


 


 6.1 x10^3/uL


(4.0-11.0)


 


Red Blood Count


 


 


 


 4.89 x10^6/uL


(4.30-5.70)


 


Hemoglobin


 


 


 


 12.8 g/dL


(13.0-17.5)


 


Hematocrit


 


 


 


 39.4 %


(39.0-53.0)


 


Mean Corpuscular Volume    81 fL () 


 


Mean Corpuscular Hemoglobin    26 pg (25-35) 


 


Mean Corpuscular Hemoglobin


Concent 


 


 


 33 g/dL


(31-37)


 


Red Cell Distribution Width


 


 


 


 14.2 %


(11.5-14.5)


 


Platelet Count


 


 


 


 186 x10^3/uL


(140-400)


 


Neutrophils (%) (Auto)    86 % (31-73) 


 


Lymphocytes (%) (Auto)    10 % (24-48) 


 


Monocytes (%) (Auto)    5 % (0-9) 


 


Eosinophils (%) (Auto)    0 % (0-3) 


 


Basophils (%) (Auto)    0 % (0-3) 


 


Neutrophils # (Auto)


 


 


 


 5.2 x10^3/uL


(1.8-7.7)


 


Lymphocytes # (Auto)


 


 


 


 0.6 x10^3/uL


(1.0-4.8)


 


Monocytes # (Auto)


 


 


 


 0.3 x10^3/uL


(0.0-1.1)


 


Eosinophils # (Auto)


 


 


 


 0.0 x10^3/uL


(0.0-0.7)


 


Basophils # (Auto)


 


 


 


 0.0 x10^3/uL


(0.0-0.2)


 


Segmented Neutrophils %    78 % (35-66) 


 


Band Neutrophils %    11 % (0-9) 


 


Lymphocytes %    7 % (24-48) 


 


Atypical Lymphocytes %


(Manual) 


 


 


 2 % (0-0) 





 


Monocytes %    2 % (0-10) 


 


Platelet Estimate


 


 


 


 Adequate


(ADEQUATE)


 


Sodium Level


 


 


 


 126 mmol/L


(136-145)


 


Potassium Level


 


 


 


 4.7 mmol/L


(3.5-5.1)


 


Chloride Level


 


 


 


 91 mmol/L


()


 


Carbon Dioxide Level


 


 


 


 24 mmol/L


(21-32)


 


Anion Gap    11 (6-14) 


 


Blood Urea Nitrogen


 


 


 


 28 mg/dL


(8-26)


 


Creatinine


 


 


 


 1.8 mg/dL


(0.7-1.3)


 


Estimated GFR


(Cockcroft-Gault) 


 


 


 50.6 





 


Glucose Level


 


 


 


 448 mg/dL


(70-99)


 


Calcium Level


 


 


 


 7.6 mg/dL


(8.5-10.1)


 


Test


 8/5/20


07:23 8/5/20


08:25 8/5/20


11:45 8/5/20


12:10


 


Glucose (Fingerstick)


 427 mg/dL


(70-99) 394 mg/dL


(70-99) 387 mg/dL


(70-99) 398 mg/dL


(70-99)


 


Test


 8/5/20


16:27 8/5/20


21:25 8/6/20


07:26 





 


Glucose (Fingerstick)


 415 mg/dL


(70-99) 341 mg/dL


(70-99) 330 mg/dL


(70-99) 











Laboratory Tests








Test


 8/5/20


11:45 8/5/20


12:10 8/5/20


16:27 8/5/20


21:25


 


Glucose (Fingerstick)


 387 mg/dL


(70-99) 398 mg/dL


(70-99) 415 mg/dL


(70-99) 341 mg/dL


(70-99)


 


Test


 8/6/20


07:26 


 


 





 


Glucose (Fingerstick)


 330 mg/dL


(70-99) 


 


 











Medications





Active Scripts








 Medications  Dose


 Route/Sig


 Max Daily Dose Days Date Category


 


 Levemir (Insulin


 Detemir) 100


 Unit/1 Ml Vial  65 Unit


 SQ BID


   8/4/20 Reported


 


 Novolog (Insulin


 Aspart) 100


 Unit/1 Ml Vial  34 Unit


 SQ TID


   8/4/20 Reported


 


 Hydrochlorothiazide


 Tablet


  (Hydrochlorothiazide)


 12.5 Mg Tablet  12.5 Mg


 PO DAILY


   3/15/20 Rx








Comments


CT chest 


 


Impression:


1.  Essentially nondiagnostic study for evaluation of pulmonary embolism 


due to contrast bolus timing. If persistent clinical concern, repeat CT 


angiogram or VQ scan can better assess.


2.  Multifocal ground glass opacities bilaterally predominantly within the


left upper and lower lobes, concerning for infection such as viral 


pneumonia, ARDS or asymmetric pulmonary edema. Recommend follow-up to 


ensure resolution.





Impression


.


1.  Acute hypoxic respiratory failure secondary to COVID-19 pneumonia.-- 

worsening 


2.  Abnormal CT chest consistent with pneumonia with ground glass opacities


bilaterally, poor contrast was received as a result pulmonary embolism could not


be ruled out, but he has low clinical suspicion for pulmonary embolism and as


such no further investigation is needed.


3.  Underlying morbid obesity.


4.  Diabetes.


5.  Hypertension.


6. MIAN


7. Hypertension





Plan


.


Continue with present oxygen and keep saturations 92-94, will use vapotherm if 

need, transfer to ICU as pt. is requiring more oxygen


abx per ID if needed.( not on any at present)


Continue IV steroids due to hypoxia.


Monitor renal function 


Proceed with convalescent plasma, follow clinical course 


Weight loss is advised.


HTN per PCP


DM per PCP 





DVT/GI PPX 





 Discussed with RN.











AZAM REAGAN MD                  Aug 6, 2020 10:57

## 2020-08-06 NOTE — NUR
SS following up with discharge planning. SS reviewed pt chart and discussed with pt RN. Pt 
transferred to ICU room 113. COVID19 positive. Pt currently on Vapotherm. SS will continue 
to follow for discharge planning.

## 2020-08-06 NOTE — NUR
Pt removed NC, quickly de sat into the 70's placed patient back on NC @6 L, pt O2 sat 
improved to low 80's. Spoke with Rajneet, RT recommended simple mask at 10 L. After 10 minutes 
pt still not above 89%. spoke with Ranjeet, RT again, recommended Non-rebreather at 15L until 
patient recovers then try to wean patient back down to 6L NC. Non-rebreather placed for 10 
min, pt recovered to 97%, removed and placed patient on NC at 6L, pt stable at 95%, no 
distress. No complaints of shortness of breath from patient, resting comfortably. Pt refused 
to have humidifier placed at this time as he states "it caused alot of phlegm". Will 
continue to monitor.

## 2020-08-06 NOTE — PDOC
[facility] PROGRESS NOTE


Date of Service


DOS:


DATE: 8/6/20 


TIME: 11:22





Chief Complaint


Chief Complaint


1.  Acute hypoxic respiratory failure secondary to COVID-19 pneumonia.


2.  Abnormal CT chest consistent with pneumonia with ground glass opacities


bilaterally, poor contrast was received as a result pulmonary embolism could not


be ruled out, but he has low clinical suspicion for pulmonary embolism and as


such no further investigation is needed.


3.  Underlying morbid obesity.


4.  Diabetes.


5.  Hypertension.





History of Present Illness


History of Present Illness


8/6/2020


Patient is seen and examined


Patient is resting comfortably


Charts reviewed


Discussed with RN





8/5/2020


Patient is seen and examined


Chats reviewed


Discussed with RN





Vitals/I&O


Vitals/I&O:





                                   Vital Signs








  Date Time  Temp Pulse Resp B/P (MAP) Pulse Ox O2 Delivery O2 Flow Rate FiO2


 


8/6/20 10:22  90  196/99    


 


8/6/20 08:00      Venturi Mask  


 


8/6/20 07:15 97.9  22  94  15.0 





 97.9       














                                    I & O   


 


 8/5/20 8/5/20 8/6/20





 15:00 23:00 07:00


 


Intake Total 1120 ml 210 ml 400 ml


 


Output Total 1500 ml 850 ml 400 ml


 


Balance -380 ml -640 ml 0 ml











Physical Exam


Physical Exam:


GENERAL:  Alert and oriented gentleman, not in distress.


VITAL SIGNS:  Stable.  


HEENT:  NAD.


NECK:  Supple, no JVP, no lymphadenopathy.


LUNGS:  Clear.


HEART:  S1, S2 regular.


ABDOMEN:  Benign.


EXTREMITIES:  No edema or cyanosis.


SKIN:  Unremarkable.


NEUROLOGIC:  The patient is alert, awake, and appropriate.  No focal neurologic


deficit.


General:  Alert, Oriented X3, Cooperative


Heart:  Regular rate, Normal S1, Normal S2, No murmurs


Abdomen:  Normal bowel sounds, No masses


Extremities:  Normal pulses, No tenderness/swelling


Skin:  No significant lesion





Labs


Labs:





Laboratory Tests








Test


 8/5/20


11:45 8/5/20


12:10 8/5/20


16:27 8/5/20


21:25


 


Glucose (Fingerstick)


 387 mg/dL


(70-99) 398 mg/dL


(70-99) 415 mg/dL


(70-99) 341 mg/dL


(70-99)


 


Test


 8/6/20


07:26 


 


 





 


Glucose (Fingerstick)


 330 mg/dL


(70-99) 


 


 














Review of Systems


Review of Systems:


All systems are otherwise negative





Assessment and Plan


Assessmemt and Plan


Problems


Medical Problems:


(1) COVID-19 virus detected


Status: Acute  





Assessment


1.  Acute hypoxic respiratory failure secondary to COVID-19 pneumonia.


2.  Abnormal CT chest consistent with pneumonia with ground glass opacities


bilaterally, poor contrast was received as a result pulmonary embolism could not


be ruled out, but he has low clinical suspicion for pulmonary embolism and as


such no further investigation is needed.


3.  Underlying morbid obesity.


4.  Diabetes.


5.  Hypertension.





Plan


Appreciate subspecialist input


IV Antibiotics


IV steroids


Add Metoprolol and Amlodipine for BP control


Nephrology consult 


Home meds


Trend Labs








Comment


Review of Relevant


I have reviewed the following items joseph (where applicable) has been applied.


Medications:





Current Medications








 Medications


  (Trade)  Dose


 Ordered  Sig/Toni


 Route


 PRN Reason  Start Time


 Stop Time Status Last Admin


Dose Admin


 


 Glyburide


  (Diabeta)  5 mg  BIDWMEALS


 PO


   8/5/20 17:00


    8/6/20 09:03





 


 Metoprolol


 Tartrate


  (Lopressor)  50 mg  BID


 PO


   8/6/20 10:00


    8/6/20 10:22














Justicifation of Admission Dx:


Justifications for Admission:


Justification of Admission Dx:  Yes


Respiratory Failure:  Non-Cardiac Pulm Edema











JAIRO BUSCH III DO            Aug 6, 2020 11:27

## 2020-08-07 NOTE — PDOC
Infectious Disease Note


Subjective


Subjective


pt says he is not feeling bad, 


no fever,





ROS


ROS


no n/v/d/





Vital Sign


Vital Signs





Vital Signs








  Date Time  Temp Pulse Resp B/P (MAP) Pulse Ox O2 Delivery O2 Flow Rate FiO2


 


8/7/20 07:00  92 35 150/85 (106) 88 Vapotherm 35.0 


 


8/7/20 04:00 98.1       





 98.1       











Physical Exam


PHYSICAL EXAM


GENERAL:  Alert and oriented gentleman, not in distress.


VITAL SIGNS:  Stable.  


HEENT:  NAD.


NECK:  Supple, no JVP, no lymphadenopathy.


LUNGS:  Clear.


HEART:  S1, S2 regular.


ABDOMEN:  Benign.


EXTREMITIES:  No edema or cyanosis.


SKIN:  Unremarkable.


NEUROLOGIC:  The patient is alert, awake, and appropriate.  No focal neurologic


deficit.





Labs


Lab





Laboratory Tests








Test


 8/6/20


12:10 8/6/20


12:12 8/6/20


16:30 8/6/20


17:19


 


White Blood Count


 15.7 x10^3/uL


(4.0-11.0) 


 


 





 


Red Blood Count


 4.81 x10^6/uL


(4.30-5.70) 


 


 





 


Hemoglobin


 12.6 g/dL


(13.0-17.5) 


 


 





 


Hematocrit


 38.5 %


(39.0-53.0) 


 


 





 


Mean Corpuscular Volume 80 fL ()    


 


Mean Corpuscular Hemoglobin 26 pg (25-35)    


 


Mean Corpuscular Hemoglobin


Concent 33 g/dL


(31-37) 


 


 





 


Red Cell Distribution Width


 14.6 %


(11.5-14.5) 


 


 





 


Platelet Count


 269 x10^3/uL


(140-400) 


 


 





 


D-Dimer (Radha)


 4.67 ug/mlFEU


(0.00-0.50) 


 


 





 


Sodium Level


 131 mmol/L


(136-145) 


 


 





 


Potassium Level


 4.3 mmol/L


(3.5-5.1) 


 


 





 


Chloride Level


 97 mmol/L


() 


 


 





 


Carbon Dioxide Level


 28 mmol/L


(21-32) 


 


 





 


Anion Gap 6 (6-14)    


 


Blood Urea Nitrogen


 42 mg/dL


(8-26) 


 


 





 


Creatinine


 1.7 mg/dL


(0.7-1.3) 


 


 





 


Estimated GFR


(Cockcroft-Gault) 54.0 


 


 


 





 


BUN/Creatinine Ratio 25 (6-20)    


 


Glucose Level


 351 mg/dL


(70-99) 


 


 





 


Calcium Level


 8.1 mg/dL


(8.5-10.1) 


 


 





 


Magnesium Level


 2.6 mg/dL


(1.8-2.4) 


 


 





 


Ferritin


 969 ng/mL


() 


 


 





 


Total Bilirubin


 0.5 mg/dL


(0.2-1.0) 


 


 





 


Aspartate Amino Transf


(AST/SGOT) 42 U/L (15-37) 


 


 


 





 


Alanine Aminotransferase


(ALT/SGPT) 24 U/L (16-63) 


 


 


 





 


Alkaline Phosphatase


 127 U/L


() 


 


 





 


Creatine Kinase


 290 U/L


() 


 


 





 


Total Protein


 6.7 g/dL


(6.4-8.2) 


 


 





 


Albumin


 2.2 g/dL


(3.4-5.0) 


 


 





 


Albumin/Globulin Ratio 0.5 (1.0-1.7)    


 


Glucose (Fingerstick)


 


 325 mg/dL


(70-99) 


 320 mg/dL


(70-99)


 


O2 Saturation   91 % (92-99)  


 


Arterial Blood pH


 


 


 7.43


(7.35-7.45) 





 


Arterial Blood pCO2 at


Patient Temp 


 


 37 mmHg


(35-46) 





 


Arterial Blood pO2 at Patient


Temp 


 


 60 mmHg


() 





 


Arterial Blood HCO3


 


 


 25 mmol/L


(21-28) 





 


Arterial Blood Base Excess


 


 


 1 mmol/L


(-3-3) 





 


FiO2   60%  


 


Test


 8/6/20


20:43 8/7/20


05:25 


 





 


Glucose (Fingerstick)


 299 mg/dL


(70-99) 


 


 





 


White Blood Count


 


 15.3 x10^3/uL


(4.0-11.0) 


 





 


Red Blood Count


 


 4.68 x10^6/uL


(4.30-5.70) 


 





 


Hemoglobin


 


 12.2 g/dL


(13.0-17.5) 


 





 


Hematocrit


 


 37.6 %


(39.0-53.0) 


 





 


Mean Corpuscular Volume  80 fL ()   


 


Mean Corpuscular Hemoglobin  26 pg (25-35)   


 


Mean Corpuscular Hemoglobin


Concent 


 33 g/dL


(31-37) 


 





 


Red Cell Distribution Width


 


 15.0 %


(11.5-14.5) 


 





 


Platelet Count


 


 282 x10^3/uL


(140-400) 


 





 


Neutrophils (%) (Auto)  84 % (31-73)   


 


Lymphocytes (%) (Auto)  6 % (24-48)   


 


Monocytes (%) (Auto)  10 % (0-9)   


 


Eosinophils (%) (Auto)  0 % (0-3)   


 


Basophils (%) (Auto)  0 % (0-3)   


 


Neutrophils # (Auto)


 


 12.8 x10^3/uL


(1.8-7.7) 


 





 


Lymphocytes # (Auto)


 


 0.9 x10^3/uL


(1.0-4.8) 


 





 


Monocytes # (Auto)


 


 1.5 x10^3/uL


(0.0-1.1) 


 





 


Eosinophils # (Auto)


 


 0.0 x10^3/uL


(0.0-0.7) 


 





 


Basophils # (Auto)


 


 0.0 x10^3/uL


(0.0-0.2) 


 





 


Sodium Level


 


 132 mmol/L


(136-145) 


 





 


Potassium Level


 


 4.3 mmol/L


(3.5-5.1) 


 





 


Chloride Level


 


 98 mmol/L


() 


 





 


Carbon Dioxide Level


 


 29 mmol/L


(21-32) 


 





 


Anion Gap  5 (6-14)   


 


Blood Urea Nitrogen


 


 42 mg/dL


(8-26) 


 





 


Creatinine


 


 1.5 mg/dL


(0.7-1.3) 


 





 


Estimated GFR


(Cockcroft-Gault) 


 62.4 


 


 





 


BUN/Creatinine Ratio  28 (6-20)   


 


Glucose Level


 


 284 mg/dL


(70-99) 


 





 


Calcium Level


 


 8.1 mg/dL


(8.5-10.1) 


 





 


Total Bilirubin


 


 0.4 mg/dL


(0.2-1.0) 


 





 


Aspartate Amino Transf


(AST/SGOT) 


 37 U/L (15-37) 


 


 





 


Alanine Aminotransferase


(ALT/SGPT) 


 20 U/L (16-63) 


 


 





 


Alkaline Phosphatase


 


 139 U/L


() 


 





 


Total Protein


 


 6.7 g/dL


(6.4-8.2) 


 





 


Albumin


 


 2.2 g/dL


(3.4-5.0) 


 





 


Albumin/Globulin Ratio  0.5 (1.0-1.7)   











Objective


Assessment


IMPRESSION:


1.  Fever.


2.  COVID-19 positive.


3.  Bilateral pulmonary infiltrates.


4.  Diabetes.


5.  Hypertension.


6.  Obesity.





Plan


Plan of Care


pt is on steroids


received plasma


need also CALIXTO Mauro MD                Aug 7, 2020 08:37

## 2020-08-07 NOTE — NUR
Patient yelling out at 1445. Went into patients room he had his nasal canula off, he was 
diaphoretic, and attempting to get out of bed. Got patient back into bed, he was 
tachycardic, tachypneic, by this time his O2 sats in the 50's. Patients O2 sats unable to 
recover, after attempting to calm him down and placing his nasal canula back on. Dr Hardwick 
called received orders to intubate. Patient intubated at 1530. Family was notified.

## 2020-08-07 NOTE — PDOC
[facility] PROGRESS NOTE


Date of Service


DOS:


DATE: 8/7/20 


TIME: 08:00





Chief Complaint


Chief Complaint


Acute hypoxic respiratory failure secondary to COVID-19 pneumonia.


Abnormal CT chest consistent with pneumonia with ground glass opacities 

bilaterally, poor contrast was received as a result pulmonary embolism could not

be ruled out, but he has low clinical suspicion for pulmonary embolism and as 

such no further investigation is needed.


Underlying morbid obesity.


Diabetes.


Hypertension.





History of Present Illness


History of Present Illness


Transferred to ICU for Vapotherm. ABG 63 on 90%.  Glucose in the 300s despite 

high dosing of insulin. No abdominal pain. S/p convalescent FFP





8/6/2020


Patient is seen and examined


Patient is resting comfortably


Charts reviewed


Discussed with RN





8/5/2020


Patient is seen and examined


Chats reviewed


Discussed with RN





Vitals/I&O


Vitals/I&O:





                                   Vital Signs








  Date Time  Temp Pulse Resp B/P (MAP) Pulse Ox O2 Delivery O2 Flow Rate FiO2


 


8/7/20 07:00  92 35 150/85 (106) 88 Vapotherm 35.0 


 


8/7/20 04:00 98.1       





 98.1       














                                    I & O   


 


 8/6/20 8/6/20 8/7/20





 15:00 23:00 07:00


 


Intake Total 740 ml 1126 ml 480 ml


 


Output Total 1000 ml 525 ml 625 ml


 


Balance -260 ml 601 ml -145 ml











Physical Exam


Physical Exam:


GENERAL:  Alert and oriented gentleman, not in distress.


VITAL SIGNS:  Stable.  


HEENT:  NAD.


NECK:  Supple, no JVP, no lymphadenopathy.


LUNGS:  Clear.


HEART:  S1, S2 regular.


ABDOMEN:  Benign.


EXTREMITIES:  No edema or cyanosis.


SKIN:  Unremarkable.


NEUROLOGIC:  The patient is alert, awake, and appropriate.  No focal neurologic


deficit.


General:  Alert, Oriented X3, Cooperative


Heart:  Regular rate, Normal S1, Normal S2, No murmurs


Abdomen:  Normal bowel sounds, No masses


Extremities:  Normal pulses, No tenderness/swelling


Skin:  No significant lesion





Labs


Labs:





Laboratory Tests








Test


 8/6/20


12:10 8/6/20


12:12 8/6/20


16:30 8/6/20


17:19


 


White Blood Count


 15.7 x10^3/uL


(4.0-11.0) 


 


 





 


Red Blood Count


 4.81 x10^6/uL


(4.30-5.70) 


 


 





 


Hemoglobin


 12.6 g/dL


(13.0-17.5) 


 


 





 


Hematocrit


 38.5 %


(39.0-53.0) 


 


 





 


Mean Corpuscular Volume 80 fL ()    


 


Mean Corpuscular Hemoglobin 26 pg (25-35)    


 


Mean Corpuscular Hemoglobin


Concent 33 g/dL


(31-37) 


 


 





 


Red Cell Distribution Width


 14.6 %


(11.5-14.5) 


 


 





 


Platelet Count


 269 x10^3/uL


(140-400) 


 


 





 


D-Dimer (Radha)


 4.67 ug/mlFEU


(0.00-0.50) 


 


 





 


Sodium Level


 131 mmol/L


(136-145) 


 


 





 


Potassium Level


 4.3 mmol/L


(3.5-5.1) 


 


 





 


Chloride Level


 97 mmol/L


() 


 


 





 


Carbon Dioxide Level


 28 mmol/L


(21-32) 


 


 





 


Anion Gap 6 (6-14)    


 


Blood Urea Nitrogen


 42 mg/dL


(8-26) 


 


 





 


Creatinine


 1.7 mg/dL


(0.7-1.3) 


 


 





 


Estimated GFR


(Cockcroft-Gault) 54.0 


 


 


 





 


BUN/Creatinine Ratio 25 (6-20)    


 


Glucose Level


 351 mg/dL


(70-99) 


 


 





 


Calcium Level


 8.1 mg/dL


(8.5-10.1) 


 


 





 


Magnesium Level


 2.6 mg/dL


(1.8-2.4) 


 


 





 


Ferritin


 969 ng/mL


() 


 


 





 


Total Bilirubin


 0.5 mg/dL


(0.2-1.0) 


 


 





 


Aspartate Amino Transf


(AST/SGOT) 42 U/L (15-37) 


 


 


 





 


Alanine Aminotransferase


(ALT/SGPT) 24 U/L (16-63) 


 


 


 





 


Alkaline Phosphatase


 127 U/L


() 


 


 





 


Creatine Kinase


 290 U/L


() 


 


 





 


Total Protein


 6.7 g/dL


(6.4-8.2) 


 


 





 


Albumin


 2.2 g/dL


(3.4-5.0) 


 


 





 


Albumin/Globulin Ratio 0.5 (1.0-1.7)    


 


Glucose (Fingerstick)


 


 325 mg/dL


(70-99) 


 320 mg/dL


(70-99)


 


O2 Saturation   91 % (92-99)  


 


Arterial Blood pH


 


 


 7.43


(7.35-7.45) 





 


Arterial Blood pCO2 at


Patient Temp 


 


 37 mmHg


(35-46) 





 


Arterial Blood pO2 at Patient


Temp 


 


 60 mmHg


() 





 


Arterial Blood HCO3


 


 


 25 mmol/L


(21-28) 





 


Arterial Blood Base Excess


 


 


 1 mmol/L


(-3-3) 





 


FiO2   60%  


 


Test


 8/6/20


20:43 8/7/20


05:25 


 





 


Glucose (Fingerstick)


 299 mg/dL


(70-99) 


 


 





 


White Blood Count


 


 15.4 x10^3/uL


(4.0-11.0) 


 





 


Red Blood Count


 


 4.69 x10^6/uL


(4.30-5.70) 


 





 


Hemoglobin


 


 12.3 g/dL


(13.0-17.5) 


 





 


Hematocrit


 


 37.5 %


(39.0-53.0) 


 





 


Mean Corpuscular Volume  80 fL ()   


 


Mean Corpuscular Hemoglobin  26 pg (25-35)   


 


Mean Corpuscular Hemoglobin


Concent 


 33 g/dL


(31-37) 


 





 


Red Cell Distribution Width


 


 14.8 %


(11.5-14.5) 


 





 


Platelet Count


 


 279 x10^3/uL


(140-400) 


 





 


Sodium Level


 


 132 mmol/L


(136-145) 


 





 


Potassium Level


 


 4.3 mmol/L


(3.5-5.1) 


 





 


Chloride Level


 


 98 mmol/L


() 


 





 


Carbon Dioxide Level


 


 29 mmol/L


(21-32) 


 





 


Anion Gap  5 (6-14)   


 


Blood Urea Nitrogen


 


 42 mg/dL


(8-26) 


 





 


Creatinine


 


 1.5 mg/dL


(0.7-1.3) 


 





 


Estimated GFR


(Cockcroft-Gault) 


 62.4 


 


 





 


BUN/Creatinine Ratio  28 (6-20)   


 


Glucose Level


 


 284 mg/dL


(70-99) 


 





 


Calcium Level


 


 8.1 mg/dL


(8.5-10.1) 


 





 


Total Bilirubin


 


 0.4 mg/dL


(0.2-1.0) 


 





 


Aspartate Amino Transf


(AST/SGOT) 


 37 U/L (15-37) 


 


 





 


Alanine Aminotransferase


(ALT/SGPT) 


 20 U/L (16-63) 


 


 





 


Alkaline Phosphatase


 


 139 U/L


() 


 





 


Total Protein


 


 6.7 g/dL


(6.4-8.2) 


 





 


Albumin


 


 2.2 g/dL


(3.4-5.0) 


 





 


Albumin/Globulin Ratio  0.5 (1.0-1.7)   











Assessment and Plan


Assessmemt and Plan


Problems


Medical Problems:


(1) COVID-19 virus detected


Status: Acute  











Comment


Review of Relevant


I have reviewed the following items joseph (where applicable) has been applied.


Medications:





Current Medications








 Medications


  (Trade)  Dose


 Ordered  Sig/Toni


 Route


 PRN Reason  Start Time


 Stop Time Status Last Admin


Dose Admin


 


 Metoprolol


 Tartrate


  (Lopressor)  50 mg  BID


 PO


   8/6/20 10:00


 8/7/20 07:59 DC 8/6/20 20:35





 


 Non-Formulary


 Medication 1 ea/


 Sodium Chloride  210 ml @ 


 210 mls/hr  1X  ONCE


 IV


   8/6/20 12:00


 8/6/20 12:59 DC 8/6/20 12:14





 


 Heparin Sodium


  (Porcine)


  (Heparin Sodium)  5,000 unit  Q8HRS


 SQ


   8/6/20 14:00


    8/7/20 06:15





 


 Sterile Water


  (WATER for RESP)  1,000 ml  CONT  PRN


 INH


 VIA VAPOTHERM DEVICE  8/6/20 11:45


    8/6/20 17:57





 


 Nicardipine HCl


 50 mg/Sodium


 Chloride  250 ml @ 


 25 mls/hr  CONT  PRN


 IV


 SEE I/O RECORD  8/7/20 04:15


    8/7/20 04:25














Justicifation of Admission Dx:


Justifications for Admission:


Justification of Admission Dx:  Yes


Respiratory Failure:  Non-Cardiac Pulm Edema











CHRISTOPHER GONZALEZ MD         Aug 7, 2020 08:01

## 2020-08-07 NOTE — RAD
AP portable chest  radiograph 8/7/2020

 

Clinical History: Tube and line placement.

 

Two AP semi erect portable digital radiographs of the chest were obtained.

 

Comparison study is dated 8/4/2020.

 

An ET tube has been placed. The tip of this tube overlies the trachea at 

the level of clavicles. An NG tube has been placed. The tip of this tube 

is off these radiographs in the region of the body the stomach. A right 

internal jugular central venous catheter has been placed. The tip of this 

catheter extends to overlie the superior vena cava. The cardiac silhouette

is borderline enlarged. The thoracic aorta is minimally tortuous. 

Bilateral perihilar infiltrates, left greater than right are seen which 

have increased slightly. No pneumothorax or pleural effusion is noted. The

osseous structures are unchanged.

 

Impression: Tube and line position as discussed above. No pneumothorax is 

seen.

 

Electronically signed by: Ad Goddard MD (8/7/2020 5:39 PM) [id number]

## 2020-08-07 NOTE — PDOC2
CONSULT


Date of Consult


Date of Consult


DATE: 8/7/20 


TIME: 10:53





Reason for Consult


Reason for Consult:


CRI





Referring Physician


Referring Physician:


Dr. Allen





History of Present Illness


Reason for Visit:


Pt is a 41-year-old -American  male Admitted on 8/4  Hx of   obesity, 

hypertension, and diabetes, who came in with shortness of breath. 


The patient had been tested  on 29th positive with COVID.  The patient had been 

short of breath and hypoxic and running fever and has bilateral


infiltrates. Denies any nausea, vomiting, or diarrhea.  He denies any chest 

pain, headache, visual symptoms,


or abdominal pain. Denies any Urinary symptoms  . H has Good UOP 


Currently comforable, No complaints, CoViD pending  





He was at [facility] in 2018 Post surgery  developed MIAN , Cr improved to  1.8 at [place] , 

Cr normal in 2019. He has not seen a Nephrologist since [place] in 2018  


 He does have a strong family history in terms of her brother and dad both have 

renal failure





Past Medical History


Past Medical History


 Positive for diabetes mellitus, hypertension, and


obesity.


Endocrine:  Diabetes





Past Surgical History


Past Surgical History:  No pertinent history





Family History


Family History


Brother and dad have renal failure


Family History:  Diabetes, Hypertension, Kidney Disease





Social History


Social History





  Negative for smoking, alcohol, or illicit drug use.


ALCOHOL:  rare


Lives:  Alone





Current Problem List


Problem List


Problems


Medical Problems:


(1) COVID-19 virus detected


Status: Acute  











Current Medications


Current Medications





Current Medications


Sodium Chloride 1,000 ml @  1,000 mls/hr 1X  ONCE IV  Last administered on 

8/4/20at 09:21;  Start 8/4/20 at 08:45;  Stop 8/4/20 at 09:44;  Status DC


Ondansetron HCl (Zofran) 4 mg 1X  ONCE IVP  Last administered on 8/4/20at 09:21;

 Start 8/4/20 at 08:45;  Stop 8/4/20 at 08:47;  Status DC


Acetaminophen (Tylenol) 1,000 mg 1X  ONCE PO  Last administered on 8/4/20at 

09:21;  Start 8/4/20 at 08:45;  Stop 8/4/20 at 08:47;  Status DC


Iohexol (Omnipaque 350 Mg/ml) 80 ml 1X  ONCE IV  Last administered on 8/4/20at 

10:30;  Start 8/4/20 at 10:15;  Stop 8/4/20 at 10:16;  Status DC


Info (CONTRAST GIVEN -- Rx MONITORING) 1 each PRN DAILY  PRN MC SEE COMMENTS;  

Start 8/4/20 at 10:15;  Stop 8/6/20 at 10:14;  Status DC


Ondansetron HCl (Zofran) 4 mg PRN Q8HRS  PRN IV NAUSEA/VOMITING;  Start 8/4/20 

at 11:45;  Stop 8/5/20 at 11:44;  Status DC


Methylprednisolone Sodium Succinate (SOLU-Medrol 40MG VIAL) 40 mg BID IM ;  

Start 8/4/20 at 21:00;  Stop 8/4/20 at 21:32;  Status DC


Multivitamins (Thera M Plus) 1 tab DAILY PO  Last administered on 8/7/20at 

08:47;  Start 8/5/20 at 09:00


Cefazolin Sodium (Ancef) 1 gm 1X  ONCE IVP ;  Start 8/4/20 at 13:15;  Stop 

8/4/20 at 13:16;  Status UNV


Insulin Human Lispro (HumaLOG) 34 units TIDWMEALS SQ  Last administered on 

8/6/20at 17:21;  Start 8/4/20 at 18:30;  Stop 8/7/20 at 08:00;  Status DC


Insulin Glargine (Lantus Syringe) 65 unit BID SQ  Last administered on 8/6/20at 

20:38;  Start 8/4/20 at 21:00;  Stop 8/7/20 at 08:00;  Status DC


Acetaminophen (Tylenol) 650 mg PRN Q6HRS  PRN PO MILD PAIN / TEMP > 100.3'F Last

administered on 8/6/20at 08:59;  Start 8/4/20 at 18:00


Insulin Human Lispro (HumaLOG) 0-9 UNITS TIDWMEALS SQ  Last administered on 

8/7/20at 08:56;  Start 8/5/20 at 08:00


Dextrose (Dextrose 50%-Water Syringe) 12.5 gm PRN Q15MIN  PRN IV SEE COMMENTS;  

Start 8/4/20 at 18:00


Labetalol HCl (Normodyne Iv Push) 10 mg PRN Q2HR  PRN IVP HYPERTENSION Last 

administered on 8/7/20at 05:54;  Start 8/4/20 at 18:00;  Stop 8/7/20 at 07:59;  

Status DC


Sodium Chloride 1,000 ml @  100 mls/hr Q10H ONCE IV  Last administered on 

8/4/20at 21:32;  Start 8/4/20 at 20:00;  Stop 8/5/20 at 05:59;  Status DC


Methylprednisolone Sodium Succinate (SOLU-Medrol 40MG VIAL) 40 mg BID IV  Last 

administered on 8/7/20at 08:47;  Start 8/4/20 at 22:00


Insulin Human Lispro (HumaLOG) 45 units 1X SQ ;  Start 8/5/20 at 07:45;  Stop 

8/5/20 at 07:53;  Status DC


Insulin Human Lispro (HumaLOG) 45 units 1X SQ ;  Start 8/5/20 at 08:00;  Stop 

8/5/20 at 07:56;  Status DC


Insulin Human Lispro (HumaLOG) 45 units 1X  ONCE SQ  Last administered on 

8/5/20at 07:57;  Start 8/5/20 at 08:00;  Stop 8/5/20 at 08:01;  Status DC


Glyburide (Diabeta) 5 mg BIDWMEALS PO  Last administered on 8/7/20at 08:47;  

Start 8/5/20 at 17:00


Metoprolol Tartrate (Lopressor) 50 mg BID PO  Last administered on 8/6/20at 

20:35;  Start 8/6/20 at 10:00;  Stop 8/7/20 at 07:59;  Status DC


Amlodipine Besylate (Norvasc) 10 mg DAILY PO ;  Start 8/6/20 at 10:00;  Stop 

8/6/20 at 10:35;  Status DC


Non-Formulary Medication 1 ea/ Sodium Chloride 210 ml @  210 mls/hr 1X  ONCE IV 

Last administered on 8/6/20at 12:14;  Start 8/6/20 at 12:00;  Stop 8/6/20 at 

12:59;  Status DC


Non-Formulary Medication 1 ea/ Sodium Chloride 230 ml @  460 mls/hr Q24H IV  

Last administered on 8/7/20at 09:26;  Start 8/7/20 at 09:00;  Stop 8/10/20 at 

09:29


Heparin Sodium (Porcine) (Heparin Sodium) 5,000 unit Q8HRS SQ  Last administered

on 8/7/20at 06:15;  Start 8/6/20 at 14:00;  Stop 8/7/20 at 09:18;  Status DC


Sterile Water (WATER for RESP) 1,000 ml CONT  PRN INH VIA VAPOTHERM DEVICE Last 

administered on 8/7/20at 08:22;  Start 8/6/20 at 11:45


Nicardipine HCl 50 mg/Sodium Chloride 250 ml @  25 mls/hr CONT  PRN IV SEE I/O 

RECORD Last administered on 8/7/20at 08:46;  Start 8/7/20 at 04:15


Labetalol HCl (Normodyne Iv Push) 20 mg PRN Q4HRS  PRN IVP HYPERTENSION;  Start 

8/7/20 at 08:00


Metoprolol Tartrate (Lopressor) 100 mg BID PO  Last administered on 8/7/20at 

08:50;  Start 8/7/20 at 09:00


Hydralazine HCl (Apresoline Inj) 10 mg PRN Q4HRS  PRN IVP ELEVATED BP, SEE 

COMMENTS Last administered on 8/7/20at 08:48;  Start 8/7/20 at 08:00


Insulin Glargine (Lantus Syringe) 70 unit BID SQ  Last administered on 8/7/20at 

08:50;  Start 8/7/20 at 09:00


Insulin Human Lispro (HumaLOG) 38 units TIDWMEALS SQ  Last administered on 

8/7/20at 08:53;  Start 8/7/20 at 08:00


Enoxaparin Sodium (Lovenox 40mg Syringe) 40 mg Q12HR SQ  Last administered on 

8/7/20at 10:48;  Start 8/7/20 at 10:00


Lorazepam (Ativan Inj) 1 mg PRN Q4HRS  PRN IVP ANXIETY / AGITATION;  Start 

8/7/20 at 10:45;  Stop 8/7/20 at 10:45;  Status DC


Haloperidol Lactate (Haldol Inj) 5 mg PRN Q3HRS  PRN IVP AGITATION Last 

administered on 8/7/20at 10:48;  Start 8/7/20 at 10:45





Active Scripts


Active


Hydrochlorothiazide Tablet (Hydrochlorothiazide) 12.5 Mg Tablet 12.5 Mg PO DAILY


Reported


Levemir (Insulin Detemir) 100 Unit/1 Ml Vial 65 Unit SQ BID


Novolog (Insulin Aspart) 100 Unit/1 Ml Vial 34 Unit SQ TID





Allergies


Allergies:  


Coded Allergies:  


     piperacillin (Verified  Allergy, Intermediate, UNK, 5/5/20)


     tazobactam (Verified  Allergy, Intermediate, UNK, 5/5/20)





ROS


Review of System


   as per HPI, rest neg.





Physical Exam


Physical Exam


GENERAL:  Alert and oriented gentleman, not in distress.


HEENT:  NAD, Vapotherm/NC 


NECK:  Supple, no JVP, no lymphadenopathy.


LUNGS:  Clear, Non labored 


HEART:  S1, S2 regular.


ABDOMEN:  Benign, Obese  


EXTREMITIES:  No edema or cyanosis.


SKIN:  Unremarkable.


NEUROLOGIC:  The patient is alert, awake, and appropriate.  No focal neurologic


deficit.


 No [facility]


Vital Signs





Vital Signs








  Date Time  Temp Pulse Resp B/P (MAP) Pulse Ox O2 Delivery O2 Flow Rate FiO2


 


8/7/20 08:50  93  142/88    


 


8/7/20 08:47     92 VAPOTHERM  


 


8/7/20 08:00       40.0 


 


8/7/20 07:00   35     


 


8/7/20 04:00 98.1       





 98.1       








Assessment & Plan


MIAN - suspect ATN  vs CKD  


Cr Normal in 2019 , MIAN in 2018, No Interval labs available  


Stable renal function, E-Lytes stable, Good UOP  


Supportive care, Monitor, Strict I/O, avoid Nephrotoxins  





Fever/ COVID-19 positive- On 7/29 





Bilateral pulmonary infiltrates.





 Diabetes- Uncontrolled Diabetes  





 Hypertension.





 Obesity





Labs


Labs





Laboratory Tests








Test


 8/5/20


11:45 8/5/20


12:10 8/5/20


16:27 8/5/20


21:25


 


Glucose (Fingerstick)


 387 mg/dL


(70-99) 398 mg/dL


(70-99) 415 mg/dL


(70-99) 341 mg/dL


(70-99)


 


Test


 8/6/20


07:26 8/6/20


12:10 8/6/20


12:12 8/6/20


16:30


 


Glucose (Fingerstick)


 330 mg/dL


(70-99) 


 325 mg/dL


(70-99) 





 


White Blood Count


 


 15.7 x10^3/uL


(4.0-11.0) 


 





 


Red Blood Count


 


 4.81 x10^6/uL


(4.30-5.70) 


 





 


Hemoglobin


 


 12.6 g/dL


(13.0-17.5) 


 





 


Hematocrit


 


 38.5 %


(39.0-53.0) 


 





 


Mean Corpuscular Volume  80 fL ()   


 


Mean Corpuscular Hemoglobin  26 pg (25-35)   


 


Mean Corpuscular Hemoglobin


Concent 


 33 g/dL


(31-37) 


 





 


Red Cell Distribution Width


 


 14.6 %


(11.5-14.5) 


 





 


Platelet Count


 


 269 x10^3/uL


(140-400) 


 





 


D-Dimer (Radha)


 


 4.67 ug/mlFEU


(0.00-0.50) 


 





 


Sodium Level


 


 131 mmol/L


(136-145) 


 





 


Potassium Level


 


 4.3 mmol/L


(3.5-5.1) 


 





 


Chloride Level


 


 97 mmol/L


() 


 





 


Carbon Dioxide Level


 


 28 mmol/L


(21-32) 


 





 


Anion Gap  6 (6-14)   


 


Blood Urea Nitrogen


 


 42 mg/dL


(8-26) 


 





 


Creatinine


 


 1.7 mg/dL


(0.7-1.3) 


 





 


Estimated GFR


(Cockcroft-Gault) 


 54.0 


 


 





 


BUN/Creatinine Ratio  25 (6-20)   


 


Glucose Level


 


 351 mg/dL


(70-99) 


 





 


Calcium Level


 


 8.1 mg/dL


(8.5-10.1) 


 





 


Magnesium Level


 


 2.6 mg/dL


(1.8-2.4) 


 





 


Ferritin


 


 969 ng/mL


() 


 





 


Total Bilirubin


 


 0.5 mg/dL


(0.2-1.0) 


 





 


Aspartate Amino Transf


(AST/SGOT) 


 42 U/L (15-37) 


 


 





 


Alanine Aminotransferase


(ALT/SGPT) 


 24 U/L (16-63) 


 


 





 


Alkaline Phosphatase


 


 127 U/L


() 


 





 


Creatine Kinase


 


 290 U/L


() 


 





 


Total Protein


 


 6.7 g/dL


(6.4-8.2) 


 





 


Albumin


 


 2.2 g/dL


(3.4-5.0) 


 





 


Albumin/Globulin Ratio  0.5 (1.0-1.7)   


 


O2 Saturation    91 % (92-99) 


 


Arterial Blood pH


 


 


 


 7.43


(7.35-7.45)


 


Arterial Blood pCO2 at


Patient Temp 


 


 


 37 mmHg


(35-46)


 


Arterial Blood pO2 at Patient


Temp 


 


 


 60 mmHg


()


 


Arterial Blood HCO3


 


 


 


 25 mmol/L


(21-28)


 


Arterial Blood Base Excess


 


 


 


 1 mmol/L


(-3-3)


 


FiO2    60% 


 


Test


 8/6/20


17:19 8/6/20


20:43 8/7/20


05:25 8/7/20


08:00


 


Glucose (Fingerstick)


 320 mg/dL


(70-99) 299 mg/dL


(70-99) 


 





 


White Blood Count


 


 


 15.3 x10^3/uL


(4.0-11.0) 





 


Red Blood Count


 


 


 4.68 x10^6/uL


(4.30-5.70) 





 


Hemoglobin


 


 


 12.2 g/dL


(13.0-17.5) 





 


Hematocrit


 


 


 37.6 %


(39.0-53.0) 





 


Mean Corpuscular Volume   80 fL ()  


 


Mean Corpuscular Hemoglobin   26 pg (25-35)  


 


Mean Corpuscular Hemoglobin


Concent 


 


 33 g/dL


(31-37) 





 


Red Cell Distribution Width


 


 


 15.0 %


(11.5-14.5) 





 


Platelet Count


 


 


 282 x10^3/uL


(140-400) 





 


Neutrophils (%) (Auto)   84 % (31-73)  


 


Lymphocytes (%) (Auto)   6 % (24-48)  


 


Monocytes (%) (Auto)   10 % (0-9)  


 


Eosinophils (%) (Auto)   0 % (0-3)  


 


Basophils (%) (Auto)   0 % (0-3)  


 


Neutrophils # (Auto)


 


 


 12.8 x10^3/uL


(1.8-7.7) 





 


Lymphocytes # (Auto)


 


 


 0.9 x10^3/uL


(1.0-4.8) 





 


Monocytes # (Auto)


 


 


 1.5 x10^3/uL


(0.0-1.1) 





 


Eosinophils # (Auto)


 


 


 0.0 x10^3/uL


(0.0-0.7) 





 


Basophils # (Auto)


 


 


 0.0 x10^3/uL


(0.0-0.2) 





 


Sodium Level


 


 


 132 mmol/L


(136-145) 





 


Potassium Level


 


 


 4.3 mmol/L


(3.5-5.1) 





 


Chloride Level


 


 


 98 mmol/L


() 





 


Carbon Dioxide Level


 


 


 29 mmol/L


(21-32) 





 


Anion Gap   5 (6-14)  


 


Blood Urea Nitrogen


 


 


 42 mg/dL


(8-26) 





 


Creatinine


 


 


 1.5 mg/dL


(0.7-1.3) 





 


Estimated GFR


(Cockcroft-Gault) 


 


 62.4 


 





 


BUN/Creatinine Ratio   28 (6-20)  


 


Glucose Level


 


 


 284 mg/dL


(70-99) 





 


Calcium Level


 


 


 8.1 mg/dL


(8.5-10.1) 





 


Total Bilirubin


 


 


 0.4 mg/dL


(0.2-1.0) 





 


Aspartate Amino Transf


(AST/SGOT) 


 


 37 U/L (15-37) 


 





 


Alanine Aminotransferase


(ALT/SGPT) 


 


 20 U/L (16-63) 


 





 


Alkaline Phosphatase


 


 


 139 U/L


() 





 


Total Protein


 


 


 6.7 g/dL


(6.4-8.2) 





 


Albumin


 


 


 2.2 g/dL


(3.4-5.0) 





 


Albumin/Globulin Ratio   0.5 (1.0-1.7)  


 


O2 Saturation    92 % (92-99) 


 


Arterial Blood pH


 


 


 


 7.41


(7.35-7.45)


 


Arterial Blood pCO2 at


Patient Temp 


 


 


 32 mmHg


(35-46)


 


Arterial Blood pO2 at Patient


Temp 


 


 


 63 mmHg


()


 


Arterial Blood HCO3


 


 


 


 20 mmol/L


(21-28)


 


Arterial Blood Base Excess


 


 


 


 -4 mmol/L


(-3-3)


 


FiO2    Vapotherm 90% 


 


Test


 8/7/20


08:46 


 


 





 


Glucose (Fingerstick)


 289 mg/dL


(70-99) 


 


 











Laboratory Tests








Test


 8/6/20


12:10 8/6/20


12:12 8/6/20


16:30 8/6/20


17:19


 


White Blood Count


 15.7 x10^3/uL


(4.0-11.0) 


 


 





 


Red Blood Count


 4.81 x10^6/uL


(4.30-5.70) 


 


 





 


Hemoglobin


 12.6 g/dL


(13.0-17.5) 


 


 





 


Hematocrit


 38.5 %


(39.0-53.0) 


 


 





 


Mean Corpuscular Volume 80 fL ()    


 


Mean Corpuscular Hemoglobin 26 pg (25-35)    


 


Mean Corpuscular Hemoglobin


Concent 33 g/dL


(31-37) 


 


 





 


Red Cell Distribution Width


 14.6 %


(11.5-14.5) 


 


 





 


Platelet Count


 269 x10^3/uL


(140-400) 


 


 





 


D-Dimer (Radha)


 4.67 ug/mlFEU


(0.00-0.50) 


 


 





 


Sodium Level


 131 mmol/L


(136-145) 


 


 





 


Potassium Level


 4.3 mmol/L


(3.5-5.1) 


 


 





 


Chloride Level


 97 mmol/L


() 


 


 





 


Carbon Dioxide Level


 28 mmol/L


(21-32) 


 


 





 


Anion Gap 6 (6-14)    


 


Blood Urea Nitrogen


 42 mg/dL


(8-26) 


 


 





 


Creatinine


 1.7 mg/dL


(0.7-1.3) 


 


 





 


Estimated GFR


(Cockcroft-Gault) 54.0 


 


 


 





 


BUN/Creatinine Ratio 25 (6-20)    


 


Glucose Level


 351 mg/dL


(70-99) 


 


 





 


Calcium Level


 8.1 mg/dL


(8.5-10.1) 


 


 





 


Magnesium Level


 2.6 mg/dL


(1.8-2.4) 


 


 





 


Ferritin


 969 ng/mL


() 


 


 





 


Total Bilirubin


 0.5 mg/dL


(0.2-1.0) 


 


 





 


Aspartate Amino Transf


(AST/SGOT) 42 U/L (15-37) 


 


 


 





 


Alanine Aminotransferase


(ALT/SGPT) 24 U/L (16-63) 


 


 


 





 


Alkaline Phosphatase


 127 U/L


() 


 


 





 


Creatine Kinase


 290 U/L


() 


 


 





 


Total Protein


 6.7 g/dL


(6.4-8.2) 


 


 





 


Albumin


 2.2 g/dL


(3.4-5.0) 


 


 





 


Albumin/Globulin Ratio 0.5 (1.0-1.7)    


 


Glucose (Fingerstick)


 


 325 mg/dL


(70-99) 


 320 mg/dL


(70-99)


 


O2 Saturation   91 % (92-99)  


 


Arterial Blood pH


 


 


 7.43


(7.35-7.45) 





 


Arterial Blood pCO2 at


Patient Temp 


 


 37 mmHg


(35-46) 





 


Arterial Blood pO2 at Patient


Temp 


 


 60 mmHg


() 





 


Arterial Blood HCO3


 


 


 25 mmol/L


(21-28) 





 


Arterial Blood Base Excess


 


 


 1 mmol/L


(-3-3) 





 


FiO2   60%  


 


Test


 8/6/20


20:43 8/7/20


05:25 8/7/20


08:00 8/7/20


08:46


 


Glucose (Fingerstick)


 299 mg/dL


(70-99) 


 


 289 mg/dL


(70-99)


 


White Blood Count


 


 15.3 x10^3/uL


(4.0-11.0) 


 





 


Red Blood Count


 


 4.68 x10^6/uL


(4.30-5.70) 


 





 


Hemoglobin


 


 12.2 g/dL


(13.0-17.5) 


 





 


Hematocrit


 


 37.6 %


(39.0-53.0) 


 





 


Mean Corpuscular Volume  80 fL ()   


 


Mean Corpuscular Hemoglobin  26 pg (25-35)   


 


Mean Corpuscular Hemoglobin


Concent 


 33 g/dL


(31-37) 


 





 


Red Cell Distribution Width


 


 15.0 %


(11.5-14.5) 


 





 


Platelet Count


 


 282 x10^3/uL


(140-400) 


 





 


Neutrophils (%) (Auto)  84 % (31-73)   


 


Lymphocytes (%) (Auto)  6 % (24-48)   


 


Monocytes (%) (Auto)  10 % (0-9)   


 


Eosinophils (%) (Auto)  0 % (0-3)   


 


Basophils (%) (Auto)  0 % (0-3)   


 


Neutrophils # (Auto)


 


 12.8 x10^3/uL


(1.8-7.7) 


 





 


Lymphocytes # (Auto)


 


 0.9 x10^3/uL


(1.0-4.8) 


 





 


Monocytes # (Auto)


 


 1.5 x10^3/uL


(0.0-1.1) 


 





 


Eosinophils # (Auto)


 


 0.0 x10^3/uL


(0.0-0.7) 


 





 


Basophils # (Auto)


 


 0.0 x10^3/uL


(0.0-0.2) 


 





 


Sodium Level


 


 132 mmol/L


(136-145) 


 





 


Potassium Level


 


 4.3 mmol/L


(3.5-5.1) 


 





 


Chloride Level


 


 98 mmol/L


() 


 





 


Carbon Dioxide Level


 


 29 mmol/L


(21-32) 


 





 


Anion Gap  5 (6-14)   


 


Blood Urea Nitrogen


 


 42 mg/dL


(8-26) 


 





 


Creatinine


 


 1.5 mg/dL


(0.7-1.3) 


 





 


Estimated GFR


(Cockcroft-Gault) 


 62.4 


 


 





 


BUN/Creatinine Ratio  28 (6-20)   


 


Glucose Level


 


 284 mg/dL


(70-99) 


 





 


Calcium Level


 


 8.1 mg/dL


(8.5-10.1) 


 





 


Total Bilirubin


 


 0.4 mg/dL


(0.2-1.0) 


 





 


Aspartate Amino Transf


(AST/SGOT) 


 37 U/L (15-37) 


 


 





 


Alanine Aminotransferase


(ALT/SGPT) 


 20 U/L (16-63) 


 


 





 


Alkaline Phosphatase


 


 139 U/L


() 


 





 


Total Protein


 


 6.7 g/dL


(6.4-8.2) 


 





 


Albumin


 


 2.2 g/dL


(3.4-5.0) 


 





 


Albumin/Globulin Ratio  0.5 (1.0-1.7)   


 


O2 Saturation   92 % (92-99)  


 


Arterial Blood pH


 


 


 7.41


(7.35-7.45) 





 


Arterial Blood pCO2 at


Patient Temp 


 


 32 mmHg


(35-46) 





 


Arterial Blood pO2 at Patient


Temp 


 


 63 mmHg


() 





 


Arterial Blood HCO3


 


 


 20 mmol/L


(21-28) 





 


Arterial Blood Base Excess


 


 


 -4 mmol/L


(-3-3) 





 


FiO2   Vapotherm 90%  








Review


All relevant outside records, renal labs, imaging studies, telemetry/EKG's were 

reviewed.











SABAS GOTTI MD                 Aug 7, 2020 10:53

## 2020-08-07 NOTE — RAD
AP abdomen radiograph 8/7/2020

 

CLINICAL HISTORY: OG tube placement.

 

An AP semierect portable digital radiograph of the abdomen to include the 

lower chest was obtained. A NG tube is been placed. The tip of this tube 

extends to overlie the distal body/antrum of the stomach. The cardiac 

silhouette is borderline enlarged. Bilateral perihilar infiltrates, left 

greater than right is seen. The visualized abdominal bowel gas pattern is 

nonobstructive. The osseous structures are grossly intact.

 

IMPRESSION: The tip of the OG tube extends to overlie the distal 

body/antrum of the stomach

 

Electronically signed by: Ad Goddard MD (8/7/2020 5:41 PM) [id number]

## 2020-08-07 NOTE — PDOC
PULMONARY PROGRESS NOTES


DATE: 8/7/20 


TIME: 11:41


Subjective


remains on vapotherm, 90%FIO2


reports increased SOB and cough 


afebrile at this time


Vitals





Vital Signs








  Date Time  Temp Pulse Resp B/P (MAP) Pulse Ox O2 Delivery O2 Flow Rate FiO2


 


8/7/20 08:50  93  142/88    


 


8/7/20 08:47     92 VAPOTHERM  


 


8/7/20 08:15   34    40.0 


 


8/7/20 08:00 98.6       





 98.6       








Comments


Visual exam preformed as Pt. seen during COVID-19 pandemic 


ON VAPOTHERM ,90%fio2


General:  Alert, Mild Distress


Labs





Laboratory Tests








Test


 8/5/20


11:45 8/5/20


12:10 8/5/20


16:27 8/5/20


21:25


 


Glucose (Fingerstick)


 387 mg/dL


(70-99) 398 mg/dL


(70-99) 415 mg/dL


(70-99) 341 mg/dL


(70-99)


 


Test


 8/6/20


07:26 8/6/20


12:10 8/6/20


12:12 8/6/20


16:30


 


Glucose (Fingerstick)


 330 mg/dL


(70-99) 


 325 mg/dL


(70-99) 





 


White Blood Count


 


 15.7 x10^3/uL


(4.0-11.0) 


 





 


Red Blood Count


 


 4.81 x10^6/uL


(4.30-5.70) 


 





 


Hemoglobin


 


 12.6 g/dL


(13.0-17.5) 


 





 


Hematocrit


 


 38.5 %


(39.0-53.0) 


 





 


Mean Corpuscular Volume  80 fL ()   


 


Mean Corpuscular Hemoglobin  26 pg (25-35)   


 


Mean Corpuscular Hemoglobin


Concent 


 33 g/dL


(31-37) 


 





 


Red Cell Distribution Width


 


 14.6 %


(11.5-14.5) 


 





 


Platelet Count


 


 269 x10^3/uL


(140-400) 


 





 


D-Dimer (Radha)


 


 4.67 ug/mlFEU


(0.00-0.50) 


 





 


Sodium Level


 


 131 mmol/L


(136-145) 


 





 


Potassium Level


 


 4.3 mmol/L


(3.5-5.1) 


 





 


Chloride Level


 


 97 mmol/L


() 


 





 


Carbon Dioxide Level


 


 28 mmol/L


(21-32) 


 





 


Anion Gap  6 (6-14)   


 


Blood Urea Nitrogen


 


 42 mg/dL


(8-26) 


 





 


Creatinine


 


 1.7 mg/dL


(0.7-1.3) 


 





 


Estimated GFR


(Cockcroft-Gault) 


 54.0 


 


 





 


BUN/Creatinine Ratio  25 (6-20)   


 


Glucose Level


 


 351 mg/dL


(70-99) 


 





 


Calcium Level


 


 8.1 mg/dL


(8.5-10.1) 


 





 


Magnesium Level


 


 2.6 mg/dL


(1.8-2.4) 


 





 


Ferritin


 


 969 ng/mL


() 


 





 


Total Bilirubin


 


 0.5 mg/dL


(0.2-1.0) 


 





 


Aspartate Amino Transf


(AST/SGOT) 


 42 U/L (15-37) 


 


 





 


Alanine Aminotransferase


(ALT/SGPT) 


 24 U/L (16-63) 


 


 





 


Alkaline Phosphatase


 


 127 U/L


() 


 





 


Creatine Kinase


 


 290 U/L


() 


 





 


Total Protein


 


 6.7 g/dL


(6.4-8.2) 


 





 


Albumin


 


 2.2 g/dL


(3.4-5.0) 


 





 


Albumin/Globulin Ratio  0.5 (1.0-1.7)   


 


O2 Saturation    91 % (92-99) 


 


Arterial Blood pH


 


 


 


 7.43


(7.35-7.45)


 


Arterial Blood pCO2 at


Patient Temp 


 


 


 37 mmHg


(35-46)


 


Arterial Blood pO2 at Patient


Temp 


 


 


 60 mmHg


()


 


Arterial Blood HCO3


 


 


 


 25 mmol/L


(21-28)


 


Arterial Blood Base Excess


 


 


 


 1 mmol/L


(-3-3)


 


FiO2    60% 


 


Test


 8/6/20


17:19 8/6/20


20:43 8/7/20


05:25 8/7/20


08:00


 


Glucose (Fingerstick)


 320 mg/dL


(70-99) 299 mg/dL


(70-99) 


 





 


White Blood Count


 


 


 15.3 x10^3/uL


(4.0-11.0) 





 


Red Blood Count


 


 


 4.68 x10^6/uL


(4.30-5.70) 





 


Hemoglobin


 


 


 12.2 g/dL


(13.0-17.5) 





 


Hematocrit


 


 


 37.6 %


(39.0-53.0) 





 


Mean Corpuscular Volume   80 fL ()  


 


Mean Corpuscular Hemoglobin   26 pg (25-35)  


 


Mean Corpuscular Hemoglobin


Concent 


 


 33 g/dL


(31-37) 





 


Red Cell Distribution Width


 


 


 15.0 %


(11.5-14.5) 





 


Platelet Count


 


 


 282 x10^3/uL


(140-400) 





 


Neutrophils (%) (Auto)   84 % (31-73)  


 


Lymphocytes (%) (Auto)   6 % (24-48)  


 


Monocytes (%) (Auto)   10 % (0-9)  


 


Eosinophils (%) (Auto)   0 % (0-3)  


 


Basophils (%) (Auto)   0 % (0-3)  


 


Neutrophils # (Auto)


 


 


 12.8 x10^3/uL


(1.8-7.7) 





 


Lymphocytes # (Auto)


 


 


 0.9 x10^3/uL


(1.0-4.8) 





 


Monocytes # (Auto)


 


 


 1.5 x10^3/uL


(0.0-1.1) 





 


Eosinophils # (Auto)


 


 


 0.0 x10^3/uL


(0.0-0.7) 





 


Basophils # (Auto)


 


 


 0.0 x10^3/uL


(0.0-0.2) 





 


Sodium Level


 


 


 132 mmol/L


(136-145) 





 


Potassium Level


 


 


 4.3 mmol/L


(3.5-5.1) 





 


Chloride Level


 


 


 98 mmol/L


() 





 


Carbon Dioxide Level


 


 


 29 mmol/L


(21-32) 





 


Anion Gap   5 (6-14)  


 


Blood Urea Nitrogen


 


 


 42 mg/dL


(8-26) 





 


Creatinine


 


 


 1.5 mg/dL


(0.7-1.3) 





 


Estimated GFR


(Cockcroft-Gault) 


 


 62.4 


 





 


BUN/Creatinine Ratio   28 (6-20)  


 


Glucose Level


 


 


 284 mg/dL


(70-99) 





 


Calcium Level


 


 


 8.1 mg/dL


(8.5-10.1) 





 


Total Bilirubin


 


 


 0.4 mg/dL


(0.2-1.0) 





 


Aspartate Amino Transf


(AST/SGOT) 


 


 37 U/L (15-37) 


 





 


Alanine Aminotransferase


(ALT/SGPT) 


 


 20 U/L (16-63) 


 





 


Alkaline Phosphatase


 


 


 139 U/L


() 





 


Total Protein


 


 


 6.7 g/dL


(6.4-8.2) 





 


Albumin


 


 


 2.2 g/dL


(3.4-5.0) 





 


Albumin/Globulin Ratio   0.5 (1.0-1.7)  


 


O2 Saturation    92 % (92-99) 


 


Arterial Blood pH


 


 


 


 7.41


(7.35-7.45)


 


Arterial Blood pCO2 at


Patient Temp 


 


 


 32 mmHg


(35-46)


 


Arterial Blood pO2 at Patient


Temp 


 


 


 63 mmHg


()


 


Arterial Blood HCO3


 


 


 


 20 mmol/L


(21-28)


 


Arterial Blood Base Excess


 


 


 


 -4 mmol/L


(-3-3)


 


FiO2    Vapotherm 90% 


 


Test


 8/7/20


08:46 8/7/20


11:33 


 





 


Glucose (Fingerstick)


 289 mg/dL


(70-99) 280 mg/dL


(70-99) 


 











Laboratory Tests








Test


 8/6/20


12:10 8/6/20


12:12 8/6/20


16:30 8/6/20


17:19


 


White Blood Count


 15.7 x10^3/uL


(4.0-11.0) 


 


 





 


Red Blood Count


 4.81 x10^6/uL


(4.30-5.70) 


 


 





 


Hemoglobin


 12.6 g/dL


(13.0-17.5) 


 


 





 


Hematocrit


 38.5 %


(39.0-53.0) 


 


 





 


Mean Corpuscular Volume 80 fL ()    


 


Mean Corpuscular Hemoglobin 26 pg (25-35)    


 


Mean Corpuscular Hemoglobin


Concent 33 g/dL


(31-37) 


 


 





 


Red Cell Distribution Width


 14.6 %


(11.5-14.5) 


 


 





 


Platelet Count


 269 x10^3/uL


(140-400) 


 


 





 


D-Dimer (Radha)


 4.67 ug/mlFEU


(0.00-0.50) 


 


 





 


Sodium Level


 131 mmol/L


(136-145) 


 


 





 


Potassium Level


 4.3 mmol/L


(3.5-5.1) 


 


 





 


Chloride Level


 97 mmol/L


() 


 


 





 


Carbon Dioxide Level


 28 mmol/L


(21-32) 


 


 





 


Anion Gap 6 (6-14)    


 


Blood Urea Nitrogen


 42 mg/dL


(8-26) 


 


 





 


Creatinine


 1.7 mg/dL


(0.7-1.3) 


 


 





 


Estimated GFR


(Cockcroft-Gault) 54.0 


 


 


 





 


BUN/Creatinine Ratio 25 (6-20)    


 


Glucose Level


 351 mg/dL


(70-99) 


 


 





 


Calcium Level


 8.1 mg/dL


(8.5-10.1) 


 


 





 


Magnesium Level


 2.6 mg/dL


(1.8-2.4) 


 


 





 


Ferritin


 969 ng/mL


() 


 


 





 


Total Bilirubin


 0.5 mg/dL


(0.2-1.0) 


 


 





 


Aspartate Amino Transf


(AST/SGOT) 42 U/L (15-37) 


 


 


 





 


Alanine Aminotransferase


(ALT/SGPT) 24 U/L (16-63) 


 


 


 





 


Alkaline Phosphatase


 127 U/L


() 


 


 





 


Creatine Kinase


 290 U/L


() 


 


 





 


Total Protein


 6.7 g/dL


(6.4-8.2) 


 


 





 


Albumin


 2.2 g/dL


(3.4-5.0) 


 


 





 


Albumin/Globulin Ratio 0.5 (1.0-1.7)    


 


Glucose (Fingerstick)


 


 325 mg/dL


(70-99) 


 320 mg/dL


(70-99)


 


O2 Saturation   91 % (92-99)  


 


Arterial Blood pH


 


 


 7.43


(7.35-7.45) 





 


Arterial Blood pCO2 at


Patient Temp 


 


 37 mmHg


(35-46) 





 


Arterial Blood pO2 at Patient


Temp 


 


 60 mmHg


() 





 


Arterial Blood HCO3


 


 


 25 mmol/L


(21-28) 





 


Arterial Blood Base Excess


 


 


 1 mmol/L


(-3-3) 





 


FiO2   60%  


 


Test


 8/6/20


20:43 8/7/20


05:25 8/7/20


08:00 8/7/20


08:46


 


Glucose (Fingerstick)


 299 mg/dL


(70-99) 


 


 289 mg/dL


(70-99)


 


White Blood Count


 


 15.3 x10^3/uL


(4.0-11.0) 


 





 


Red Blood Count


 


 4.68 x10^6/uL


(4.30-5.70) 


 





 


Hemoglobin


 


 12.2 g/dL


(13.0-17.5) 


 





 


Hematocrit


 


 37.6 %


(39.0-53.0) 


 





 


Mean Corpuscular Volume  80 fL ()   


 


Mean Corpuscular Hemoglobin  26 pg (25-35)   


 


Mean Corpuscular Hemoglobin


Concent 


 33 g/dL


(31-37) 


 





 


Red Cell Distribution Width


 


 15.0 %


(11.5-14.5) 


 





 


Platelet Count


 


 282 x10^3/uL


(140-400) 


 





 


Neutrophils (%) (Auto)  84 % (31-73)   


 


Lymphocytes (%) (Auto)  6 % (24-48)   


 


Monocytes (%) (Auto)  10 % (0-9)   


 


Eosinophils (%) (Auto)  0 % (0-3)   


 


Basophils (%) (Auto)  0 % (0-3)   


 


Neutrophils # (Auto)


 


 12.8 x10^3/uL


(1.8-7.7) 


 





 


Lymphocytes # (Auto)


 


 0.9 x10^3/uL


(1.0-4.8) 


 





 


Monocytes # (Auto)


 


 1.5 x10^3/uL


(0.0-1.1) 


 





 


Eosinophils # (Auto)


 


 0.0 x10^3/uL


(0.0-0.7) 


 





 


Basophils # (Auto)


 


 0.0 x10^3/uL


(0.0-0.2) 


 





 


Sodium Level


 


 132 mmol/L


(136-145) 


 





 


Potassium Level


 


 4.3 mmol/L


(3.5-5.1) 


 





 


Chloride Level


 


 98 mmol/L


() 


 





 


Carbon Dioxide Level


 


 29 mmol/L


(21-32) 


 





 


Anion Gap  5 (6-14)   


 


Blood Urea Nitrogen


 


 42 mg/dL


(8-26) 


 





 


Creatinine


 


 1.5 mg/dL


(0.7-1.3) 


 





 


Estimated GFR


(Cockcroft-Gault) 


 62.4 


 


 





 


BUN/Creatinine Ratio  28 (6-20)   


 


Glucose Level


 


 284 mg/dL


(70-99) 


 





 


Calcium Level


 


 8.1 mg/dL


(8.5-10.1) 


 





 


Total Bilirubin


 


 0.4 mg/dL


(0.2-1.0) 


 





 


Aspartate Amino Transf


(AST/SGOT) 


 37 U/L (15-37) 


 


 





 


Alanine Aminotransferase


(ALT/SGPT) 


 20 U/L (16-63) 


 


 





 


Alkaline Phosphatase


 


 139 U/L


() 


 





 


Total Protein


 


 6.7 g/dL


(6.4-8.2) 


 





 


Albumin


 


 2.2 g/dL


(3.4-5.0) 


 





 


Albumin/Globulin Ratio  0.5 (1.0-1.7)   


 


O2 Saturation   92 % (92-99)  


 


Arterial Blood pH


 


 


 7.41


(7.35-7.45) 





 


Arterial Blood pCO2 at


Patient Temp 


 


 32 mmHg


(35-46) 





 


Arterial Blood pO2 at Patient


Temp 


 


 63 mmHg


() 





 


Arterial Blood HCO3


 


 


 20 mmol/L


(21-28) 





 


Arterial Blood Base Excess


 


 


 -4 mmol/L


(-3-3) 





 


FiO2   Vapotherm 90%  


 


Test


 8/7/20


11:33 


 


 





 


Glucose (Fingerstick)


 280 mg/dL


(70-99) 


 


 











Medications





Active Scripts








 Medications  Dose


 Route/Sig


 Max Daily Dose Days Date Category


 


 Levemir (Insulin


 Detemir) 100


 Unit/1 Ml Vial  65 Unit


 SQ BID


   8/4/20 Reported


 


 Novolog (Insulin


 Aspart) 100


 Unit/1 Ml Vial  34 Unit


 SQ TID


   8/4/20 Reported


 


 Hydrochlorothiazide


 Tablet


  (Hydrochlorothiazide)


 12.5 Mg Tablet  12.5 Mg


 PO DAILY


   3/15/20 Rx








Comments


CT chest 


 


Impression:


1.  Essentially nondiagnostic study for evaluation of pulmonary embolism 


due to contrast bolus timing. If persistent clinical concern, repeat CT 


angiogram or VQ scan can better assess.


2.  Multifocal ground glass opacities bilaterally predominantly within the


left upper and lower lobes, concerning for infection such as viral 


pneumonia, ARDS or asymmetric pulmonary edema. Recommend follow-up to 


ensure resolution.





Impression


.


1.  Acute hypoxic respiratory failure secondary to COVID-19 pneumonia.-- 

worsening HYPOXIA


2.  Abnormal CT chest consistent with pneumonia with ground glass opacities


bilaterally, poor contrast was received as a result pulmonary embolism could not


be ruled out, but he has low clinical suspicion for pulmonary embolism and as


such no further investigation is needed.


3.  Underlying morbid obesity.


4.  Diabetes.


5.  Hypertension.


6. MIAN


7. Hypertension





Plan


.


Continue with present oxygen and keep saturations 92-94,ON  vapotherm 90%FIO2


abx per ID 


Continue IV steroids due to hypoxia.


Monitor renal function 


s/p convalescent plasma, follow clinical course 


Weight loss is advised.


HTN per PCP


DM per PCP 





DVT/GI PPX 





 Discussed with RN. / pts sister in detail


high risk for intubation. will closely f/u pulmonary status


cct 35 min











AZAM REAGAN MD                  Aug 7, 2020 11:49

## 2020-08-07 NOTE — NUR
SS following up with discharge planning. Pt RN notified SS that pt is now vented. COVID19 
positive. SS will continue to follow for discharge planning.

## 2020-08-07 NOTE — PDOC
Provider Note


Provider Note


INTUBATION--





Called to intubate COVID positive patient due to respiratory failure. PPE used. 

IV meds Propofol and Anectine for anesthesia and relaxation. Intubated first 

attempt with Glidescope without difficulty. No. 7.5 oral ETT placed. +ETOC2 

noted + bilateral breath sounds. Very low O2 sat improved with positive pressure

ventilation. Taped in place by RT staff. No complications





CVL PLACEMENT--





Right IJ 3 lumen CVL placed used US guidance on first attempt with non pulsitile

dark red blood return. Seldinger technique was used. Lines aspirated and 

flushed. Sewn in place at 18 cm. Marked bleeding at insertion site and all 

needle puncture sites due to anticoagulation. No complication. CXR was ordered.





Gerry Hernandez MD





Justicifation of Admission Dx:


Justifications for Admission:


Justification of Admission Dx:  Yes


Respiratory Failure:  Non-Cardiac Pulm Edema











GERRY HERNANDEZ MD                Aug 7, 2020 19:12

## 2020-08-08 NOTE — PDOC
[facility] PROGRESS NOTE


Date of Service


DOS:


DATE: 8/8/20 


TIME: 10:23





Chief Complaint


Chief Complaint


Acute hypoxic respiratory failure secondary to COVID-19 pneumonia. s/p i

ntubation, 8/7


Bilateral pulmonary infiltrates - Abnormal CT chest consistent with pneumonia 

with ground glass opacities bilaterally, poor contrast, pulmonary embolism could

not be ruled out, but he has low clinical suspicion for pulmonary embolism and 

as such no further investigation is needed.


Underlying morbid obesity.


Diabetes.


Hypertension.


Sepsis - Fever and leukocytosis. Allergy to Zosyn.





CC time 39 minutes





History of Present Illness


History of Present Illness


Mr Dior is a 41-year-old morbidly obese male with a BMI of 51, DM2, HTN, 

diabetic foot ulcers who was brought into the hospital complaining of shortness 

of breath for 5 days prior to admit.  He was tested positive for SARS-CoV-2 

(COVID 19).  He had a cough, which has been nonproductive.  No headaches, no 

nausea, vomiting, no diarrhea, no dysuria, no focal weakness. Required 2L NCO2 

to maintain O2 saturations > 88%. He had CTA chest nondiagnostic for pulmonary 

embolism, but with multifocal ground glass opacities bilaterally predominantly 

in the left upper and lower lobes and concerning for COVID pneumonia. His T-max 

is 100.2.  Pulm and ID consulted, admitted for further care.





8/5: On 4L NCO2, asking to leave the hospital.


8/6: Resting on 15L NCO2, trying to take off O2. Transitioned to 35% vapotherm


8/7: Transferred to ICU for Vapotherm. ABG 63 on 90%.  Glucose in the 300s 

despite high dosing of insulin. No abdominal pain. S/p convalescent FFP





Afebrile. Seen on vent. He was intubated 8/7, on vent, sedated on versed, 

fentanyl, precedex, small ett secretion, on peep 10, fio2 60%.  Had a right IJ 

placed by anesthesia had bleeding for 15 to 20 minutes after placement.  Labs 

with WBC 15.7, Hb 12, platelets 255, , K5, BUN 58, CR 1.6, glucose 132, 

phosphorus 5.7.





Vitals/I&O


Vitals/I&O:





                                   Vital Signs








  Date Time  Temp Pulse Resp B/P (MAP) Pulse Ox O2 Delivery O2 Flow Rate FiO2


 


8/8/20 09:00  83 26 146/87 (106) 100 Ventilator  


 


8/8/20 08:00 98.9       





 98.9       


 


8/7/20 15:15       40.0 














                                    I & O   


 


 8/7/20 8/7/20 8/8/20





 15:00 23:00 07:00


 


Intake Total 2091 ml 239.91 ml 293.6 ml


 


Output Total 800 ml 195 ml 440 ml


 


Balance 1291 ml 44.91 ml -146.4 ml











Physical Exam


Physical Exam:


GENERAL: Propped up in bed, orally intubated and sedated. + mitts 


HEENT:  Pupils equal. ETT and OGT in place 


NECK:  Supple


LUNGS:  Clear.


HEART:  S1, S2 regular.


ABDOMEN:  Obese, bowel sounds present, soft


: Hopkins in place 


EXTREMITIES:  No edema or cyanosis. SCDs bilaterally. 


SKIN:  No signs of rash. 


NEUROLOGIC:  Sedated


RIJ (8/7) without signs of complications


Heart:  Regular rate, Normal S1, Normal S2, No murmurs


Abdomen:  Normal bowel sounds, No masses


Extremities:  Normal pulses, No tenderness/swelling


Skin:  No significant lesion





Labs


Labs:





Laboratory Tests








Test


 8/7/20


11:33 8/7/20


16:51 8/7/20


17:36 8/7/20


17:40


 


Glucose (Fingerstick)


 280 mg/dL


(70-99) 


 233 mg/dL


(70-99) 





 


Urine Collection Type  Unknown   


 


Urine Color  Yellow   


 


Urine Clarity  Clear   


 


Urine pH  7.0 (<5.0-8.0)   


 


Urine Specific Gravity


 


 1.015


(1.000-1.030) 


 





 


Urine Protein


 


 >=300 mg/dL


(NEG-TRACE) 


 





 


Urine Glucose (UA)


 


 250 mg/dL


(NEG) 


 





 


Urine Ketones (Stick)


 


 Negative mg/dL


(NEG) 


 





 


Urine Blood  Moderate (NEG)   


 


Urine Nitrite  Negative (NEG)   


 


Urine Bilirubin  Negative (NEG)   


 


Urine Urobilinogen Dipstick


 


 1.0 mg/dL (0.2


mg/dL) 


 





 


Urine Leukocyte Esterase  Negative (NEG)   


 


Urine RBC  0 /HPF (0-2)   


 


Urine WBC


 


 5-10 /HPF


(0-4) 


 





 


Urine Squamous Epithelial


Cells 


 Few /LPF 


 


 





 


Urine Amorphous Sediment  Present /HPF   


 


Urine Bacteria


 


 Mod /HPF


(0-FEW) 


 





 


Urine Hyaline Casts


 


 Occasional


/HPF 


 





 


Urine Mucus  Slight /LPF   


 


White Blood Count


 


 


 


 15.7 x10^3/uL


(4.0-11.0)


 


Red Blood Count


 


 


 


 4.55 x10^6/uL


(4.30-5.70)


 


Hemoglobin


 


 


 


 12.0 g/dL


(13.0-17.5)


 


Hematocrit


 


 


 


 36.4 %


(39.0-53.0)


 


Mean Corpuscular Volume    80 fL () 


 


Mean Corpuscular Hemoglobin    26 pg (25-35) 


 


Mean Corpuscular Hemoglobin


Concent 


 


 


 33 g/dL


(31-37)


 


Red Cell Distribution Width


 


 


 


 14.8 %


(11.5-14.5)


 


Platelet Count


 


 


 


 255 x10^3/uL


(140-400)


 


Prothrombin Time


 


 


 


 14.0 SEC


(11.7-14.0)


 


Prothromb Time International


Ratio 


 


 


 1.1 (0.8-1.1) 





 


Fibrinogen


 


 


 


 400 mg/dL


(200-440)


 


Sodium Level


 


 


 


 135 mmol/L


(136-145)


 


Potassium Level


 


 


 


 4.8 mmol/L


(3.5-5.1)


 


Chloride Level


 


 


 


 100 mmol/L


()


 


Carbon Dioxide Level


 


 


 


 30 mmol/L


(21-32)


 


Anion Gap    5 (6-14) 


 


Blood Urea Nitrogen


 


 


 


 48 mg/dL


(8-26)


 


Creatinine


 


 


 


 1.7 mg/dL


(0.7-1.3)


 


Estimated GFR


(Cockcroft-Gault) 


 


 


 54.0 





 


BUN/Creatinine Ratio    28 (6-20) 


 


Glucose Level


 


 


 


 244 mg/dL


(70-99)


 


Calcium Level


 


 


 


 7.5 mg/dL


(8.5-10.1)


 


Total Bilirubin


 


 


 


 0.4 mg/dL


(0.2-1.0)


 


Aspartate Amino Transf


(AST/SGOT) 


 


 


 40 U/L (15-37) 





 


Alanine Aminotransferase


(ALT/SGPT) 


 


 


 24 U/L (16-63) 





 


Alkaline Phosphatase


 


 


 


 148 U/L


()


 


Total Protein


 


 


 


 6.4 g/dL


(6.4-8.2)


 


Albumin


 


 


 


 2.1 g/dL


(3.4-5.0)


 


Albumin/Globulin Ratio    0.5 (1.0-1.7) 


 


Test


 8/7/20


20:45 8/8/20


00:17 8/8/20


06:00 





 


Glucose (Fingerstick)


 241 mg/dL


(70-99) 235 mg/dL


(70-99) 


 





 


Sodium Level


 


 


 139 mmol/L


(136-145) 





 


Potassium Level


 


 


 5.0 mmol/L


(3.5-5.1) 





 


Chloride Level


 


 


 101 mmol/L


() 





 


Carbon Dioxide Level


 


 


 31 mmol/L


(21-32) 





 


Anion Gap   7 (6-14)  


 


Blood Urea Nitrogen


 


 


 58 mg/dL


(8-26) 





 


Creatinine


 


 


 1.6 mg/dL


(0.7-1.3) 





 


Estimated GFR


(Cockcroft-Gault) 


 


 57.9 


 





 


Glucose Level


 


 


 172 mg/dL


(70-99) 





 


Calcium Level


 


 


 7.8 mg/dL


(8.5-10.1) 





 


Phosphorus Level


 


 


 5.7 mg/dL


(2.6-4.7) 





 


Albumin


 


 


 2.2 g/dL


(3.4-5.0) 














Assessment and Plan


Assessmemt and Plan


Problems


Medical Problems:


(1) COVID-19 virus detected


Status: Acute  











Comment


Review of Relevant


I have reviewed the following items joseph (where applicable) has been applied.


Medications:





Current Medications








 Medications


  (Trade)  Dose


 Ordered  Sig/Toni


 Route


 PRN Reason  Start Time


 Stop Time Status Last Admin


Dose Admin


 


 Haloperidol


 Lactate


  (Haldol Inj)  5 mg  PRN Q3HRS  PRN


 IVP


 AGITATION  8/7/20 10:45


    8/7/20 10:48





 


 Dexmedetomidine


 HCl 400 mcg/


 Sodium Chloride  100 ml @ 0


 mls/hr  CONT  PRN


 IV


 SEE COMMENTS  8/7/20 11:30


    8/8/20 09:09





 


 Midazolam HCl  100 ml @ 1


 mls/hr  CONT  PRN


 IV


 SEDATION  8/7/20 15:15


    8/7/20 22:58





 


 Fentanyl Citrate  30 ml @ 0


 mls/hr  CONT  PRN


 IV


 SEE PROTOCOL  8/7/20 15:15


 8/7/20 18:59 DC 8/7/20 15:21





 


 Vecuronium Bromide


  (Norcuron Bolus)  6 mg  PRN Q6HRS  PRN


 IV


 ANXIETY / AGITATION  8/7/20 15:15


    8/7/20 15:46





 


 Insulin Human


 Lispro


  (HumaLOG)  0-9 UNITS  Q6HRS


 SQ


   8/7/20 18:00


    8/8/20 00:23





 


 Insulin Human


 Lispro


  (HumaLOG)  38 units  Q6HRS


 SQ


   8/7/20 18:00


    8/8/20 06:00





 


 Fentanyl Citrate  55 ml @ 0


 mls/hr  CONT  PRN


 IV


 SEE PROTOCOL  8/7/20 19:00


    8/7/20 18:26





 


 Sodium Bicarbonate


  (Sodium Bicarb


 Adult 8.4% Syr)  50 meq  1X  ONCE


 IV


   8/7/20 18:15


 8/7/20 18:16 DC 8/7/20 18:17














Justicifation of Admission Dx:


Justifications for Admission:


Justification of Admission Dx:  Yes


Respiratory Failure:  Non-Cardiac Pulm Edema











CHRISTOPHER GONZALEZ MD         Aug 8, 2020 10:29

## 2020-08-08 NOTE — PDOC
Infectious Disease Note


Subjective


Subjective


s/p intubation, FiO2 60% PEEP 10


Sedated 


No fevers last 24 hrs





ROS


ROS


unobtainable due to patient's condition





Vital Sign


Vital Signs





Vital Signs








  Date Time  Temp Pulse Resp B/P (MAP) Pulse Ox O2 Delivery O2 Flow Rate FiO2


 


8/8/20 08:26  80  119/75    


 


8/8/20 08:00   25  99 Ventilator  


 


8/8/20 04:00 99.4       





 99.4       


 


8/7/20 15:15       40.0 











Physical Exam


PHYSICAL EXAM


GENERAL: Propped up in bed, orally intubated and sedated. + mitts 


HEENT:  Pupils equal. ETT and OGT in place 


NECK:  Supple


LUNGS:  Clear.


HEART:  S1, S2 regular.


ABDOMEN:  Obese, bowel sounds present, soft


: Hopkins in place 


EXTREMITIES:  No edema or cyanosis. SCDs bilaterally. 


SKIN:  No signs of rash. 


NEUROLOGIC:  Sedated


RIJ (8/7) without signs of complications





Labs


Lab





Laboratory Tests








Test


 8/7/20


11:33 8/7/20


16:51 8/7/20


17:36 8/7/20


17:40


 


Glucose (Fingerstick)


 280 mg/dL


(70-99) 


 233 mg/dL


(70-99) 





 


Urine Collection Type  Unknown   


 


Urine Color  Yellow   


 


Urine Clarity  Clear   


 


Urine pH  7.0 (<5.0-8.0)   


 


Urine Specific Gravity


 


 1.015


(1.000-1.030) 


 





 


Urine Protein


 


 >=300 mg/dL


(NEG-TRACE) 


 





 


Urine Glucose (UA)


 


 250 mg/dL


(NEG) 


 





 


Urine Ketones (Stick)


 


 Negative mg/dL


(NEG) 


 





 


Urine Blood  Moderate (NEG)   


 


Urine Nitrite  Negative (NEG)   


 


Urine Bilirubin  Negative (NEG)   


 


Urine Urobilinogen Dipstick


 


 1.0 mg/dL (0.2


mg/dL) 


 





 


Urine Leukocyte Esterase  Negative (NEG)   


 


Urine RBC  0 /HPF (0-2)   


 


Urine WBC


 


 5-10 /HPF


(0-4) 


 





 


Urine Squamous Epithelial


Cells 


 Few /LPF 


 


 





 


Urine Amorphous Sediment  Present /HPF   


 


Urine Bacteria


 


 Mod /HPF


(0-FEW) 


 





 


Urine Hyaline Casts


 


 Occasional


/HPF 


 





 


Urine Mucus  Slight /LPF   


 


White Blood Count


 


 


 


 15.7 x10^3/uL


(4.0-11.0)


 


Red Blood Count


 


 


 


 4.55 x10^6/uL


(4.30-5.70)


 


Hemoglobin


 


 


 


 12.0 g/dL


(13.0-17.5)


 


Hematocrit


 


 


 


 36.4 %


(39.0-53.0)


 


Mean Corpuscular Volume    80 fL () 


 


Mean Corpuscular Hemoglobin    26 pg (25-35) 


 


Mean Corpuscular Hemoglobin


Concent 


 


 


 33 g/dL


(31-37)


 


Red Cell Distribution Width


 


 


 


 14.8 %


(11.5-14.5)


 


Platelet Count


 


 


 


 255 x10^3/uL


(140-400)


 


Prothrombin Time


 


 


 


 14.0 SEC


(11.7-14.0)


 


Prothromb Time International


Ratio 


 


 


 1.1 (0.8-1.1) 





 


Fibrinogen


 


 


 


 400 mg/dL


(200-440)


 


Sodium Level


 


 


 


 135 mmol/L


(136-145)


 


Potassium Level


 


 


 


 4.8 mmol/L


(3.5-5.1)


 


Chloride Level


 


 


 


 100 mmol/L


()


 


Carbon Dioxide Level


 


 


 


 30 mmol/L


(21-32)


 


Anion Gap    5 (6-14) 


 


Blood Urea Nitrogen


 


 


 


 48 mg/dL


(8-26)


 


Creatinine


 


 


 


 1.7 mg/dL


(0.7-1.3)


 


Estimated GFR


(Cockcroft-Gault) 


 


 


 54.0 





 


BUN/Creatinine Ratio    28 (6-20) 


 


Glucose Level


 


 


 


 244 mg/dL


(70-99)


 


Calcium Level


 


 


 


 7.5 mg/dL


(8.5-10.1)


 


Total Bilirubin


 


 


 


 0.4 mg/dL


(0.2-1.0)


 


Aspartate Amino Transf


(AST/SGOT) 


 


 


 40 U/L (15-37) 





 


Alanine Aminotransferase


(ALT/SGPT) 


 


 


 24 U/L (16-63) 





 


Alkaline Phosphatase


 


 


 


 148 U/L


()


 


Total Protein


 


 


 


 6.4 g/dL


(6.4-8.2)


 


Albumin


 


 


 


 2.1 g/dL


(3.4-5.0)


 


Albumin/Globulin Ratio    0.5 (1.0-1.7) 


 


Test


 8/7/20


20:45 8/8/20


00:17 8/8/20


06:00 





 


Glucose (Fingerstick)


 241 mg/dL


(70-99) 235 mg/dL


(70-99) 


 





 


Sodium Level


 


 


 139 mmol/L


(136-145) 





 


Potassium Level


 


 


 5.0 mmol/L


(3.5-5.1) 





 


Chloride Level


 


 


 101 mmol/L


() 





 


Carbon Dioxide Level


 


 


 31 mmol/L


(21-32) 





 


Anion Gap   7 (6-14)  


 


Blood Urea Nitrogen


 


 


 58 mg/dL


(8-26) 





 


Creatinine


 


 


 1.6 mg/dL


(0.7-1.3) 





 


Estimated GFR


(Cockcroft-Gault) 


 


 57.9 


 





 


Glucose Level


 


 


 172 mg/dL


(70-99) 





 


Calcium Level


 


 


 7.8 mg/dL


(8.5-10.1) 





 


Phosphorus Level


 


 


 5.7 mg/dL


(2.6-4.7) 





 


Albumin


 


 


 2.2 g/dL


(3.4-5.0) 














Objective


Assessment


COVID-19 positive. s/p convalescent plasma 


Bilateral pulmonary infiltrates.


Leukocytosis, on steriods. stable


Fever - improved 


Allergy to Zosyn.


Acute respiratory failure s/p intubation, 8/7


Diabetes.


Hypertension.


Obesity.





Plan


Plan of Care


Remdesivir and steroids


s/p convalescent plasma 


Maintain aspiration precautions


Airborne isolation for COVID-19


Discussed with nursing





Critically ill


Attending Co-Sign


The patient was seen and interviewed as well as examined at the bedside. The 

chart was reviewed. The case was discussed. Agree with the plan of care.











MOLLY ARECHIGA         Aug 8, 2020 08:58


CALIXTO SALINAS MD                Aug 8, 2020 12:34

## 2020-08-08 NOTE — PDOC
DATE OF SERVICE:


DOS:


DATE: 8/8/20 


TIME: 11:08





SUBJECTIVE


ROS


Follow-up for acute on chronic renal insufficiency in the setting of COVID-19 

+vitity








Patient was intubated overnight due to worsening respiratory distress





OBJECTIVE


Vital Signs





Vital Signs








  Date Time  Temp Pulse Resp B/P (MAP) Pulse Ox O2 Delivery O2 Flow Rate FiO2


 


8/8/20 10:00  78 26 140/86 (104) 100 Ventilator  


 


8/8/20 08:00 98.9       





 98.9       


 


8/7/20 15:15       40.0 








I & 0











Intake and Output0 


 


 8/8/20





 07:00


 


Intake Total 2624.51 ml


 


Output Total 1435 ml


 


Balance 1189.51 ml


 


 


 


Intake Oral 1320 ml


 


IV Total 1304.51 ml


 


Output Urine Total 1435 ml











PHYSICAL EXAM


Physical Exam


GEN: Sedated intubated on the vent currently in no apparent distress, morbidly 

obese -American gentleman


Rest of the physical exam reviewed as documented by other providers and reviewed

with ICU nurse





DIAGNOSIS/ASSESSMENT


Assessment & Plan





Possible acute renal failure in the setting of COVID-19 positivity.  Covid 

associated manifestations cannot be ruled out.  Urine output is good/acceptable 

for the time being





Possible underlying chronic kidney disease due to exogenous obesity and diabetic

hypertensive nephrosclerosis cannot be ruled out





Marginal anemia: Watch trend for now





Proteinuria: Cannot rule out nephrotic syndrome with low albumin at 

presentation.  Presumably due to diabetic hypertensive nephrosclerosis and 

exogenous obesity.  May need quantitation at some point of time.





Abnormal appearing urine: Unclear if this is due to COVID-19 manifestation of 

the kidney versus underlying diabetic hypertensive nephrosclerosis.





COMMENT/RELEVANT DATA


Meds





Current Medications








 Medications


  (Trade)  Dose


 Ordered  Sig/Toni  Start Time


 Stop Time Status Last Admin


Dose Admin


 


 Acetaminophen


  (Tylenol)  650 mg  PRN Q6HRS  PRN  8/4/20 18:00


    8/6/20 08:59


650 MG


 


 Amlodipine


 Besylate


  (Norvasc)  10 mg  DAILY  8/6/20 10:00


 8/6/20 10:35 DC  





 


 Atropine Sulfate


  (ATROPINE 0.5mg


 SYRINGE)  0.5 mg  PRN Q5MIN  PRN  8/7/20 11:30


     





 


 Cefazolin Sodium


  (Ancef)  1 gm  1X  ONCE  8/4/20 13:15


 8/4/20 13:16 UNV  





 


 Dexmedetomidine


 HCl 400 mcg/


 Sodium Chloride  100 ml @ 0


 mls/hr  CONT  PRN  8/7/20 11:30


    8/8/20 09:09


18.4 MLS/HR


 


 Dextrose


  (Dextrose


 50%-Water Syringe)  12.5 gm  PRN Q15MIN  PRN  8/4/20 18:00


     





 


 Enoxaparin Sodium


  (Lovenox 40mg


 Syringe)  40 mg  Q12HR  8/7/20 10:00


    8/8/20 08:27


40 MG


 


 Fentanyl Citrate  55 ml @ 0


 mls/hr  CONT  PRN  8/7/20 19:00


    8/7/20 18:26


1.98 MLS/HR


 


 Glyburide


  (Diabeta)  5 mg  BIDWMEALS  8/5/20 17:00


    8/8/20 08:27


5 MG


 


 Haloperidol


 Lactate


  (Haldol Inj)  5 mg  PRN Q3HRS  PRN  8/7/20 10:45


    8/7/20 10:48


5 MG


 


 Heparin Sodium


  (Porcine)


  (Heparin Sodium)  5,000 unit  Q8HRS  8/6/20 14:00


 8/7/20 09:18 DC 8/7/20 06:15


5,000 UNIT


 


 Hydralazine HCl


  (Apresoline Inj)  10 mg  PRN Q4HRS  PRN  8/7/20 08:00


    8/7/20 13:14


10 MG


 


 Info


  (CONTRAST GIVEN


 -- Rx MONITORING)  1 each  PRN DAILY  PRN  8/4/20 10:15


 8/6/20 10:14 DC  





 


 Insulin Glargine


  (Lantus Syringe)  70 unit  BID  8/7/20 09:00


    8/8/20 10:27


70 UNIT


 


 Insulin Human


 Lispro


  (HumaLOG)  38 units  Q6HRS  8/7/20 18:00


    8/8/20 06:00


38 UNITS


 


 Iohexol


  (Omnipaque 350


 Mg/ml)  80 ml  1X  ONCE  8/4/20 10:15


 8/4/20 10:16 DC 8/4/20 10:30


80 ML


 


 Labetalol HCl


  (Normodyne Iv


 Push)  20 mg  PRN Q4HRS  PRN  8/7/20 08:00


     





 


 Lorazepam


  (Ativan Inj)  1 mg  PRN Q4HRS  PRN  8/7/20 10:45


 8/7/20 10:45 DC  





 


 Methylprednisolone


 Sodium Succinate


  (SOLU-Medrol


 40MG VIAL)  40 mg  BID  8/4/20 22:00


    8/8/20 08:26


40 MG


 


 Metoprolol


 Tartrate


  (Lopressor)  100 mg  BID  8/7/20 09:00


    8/8/20 08:26


100 MG


 


 Midazolam HCl  100 ml @ 1


 mls/hr  CONT  PRN  8/7/20 15:15


    8/8/20 10:19


7 MLS/HR


 


 Multivitamins


  (Thera M Plus)  1 tab  DAILY  8/5/20 09:00


    8/8/20 08:26


1 TAB


 


 Naloxone HCl


  (Narcan)  0.4 mg  PRN Q2MIN  PRN  8/7/20 15:15


     





 


 Nicardipine HCl


 50 mg/Sodium


 Chloride  250 ml @ 


 25 mls/hr  CONT  PRN  8/7/20 04:15


    8/7/20 08:46


75 MLS/HR


 


 Non-Formulary


 Medication 1 ea/


 Sodium Chloride  230 ml @ 


 460 mls/hr  Q24H  8/7/20 09:00


 8/10/20 09:29  8/8/20 09:11


460 MLS/HR


 


 Ondansetron HCl


  (Zofran)  4 mg  PRN Q8HRS  PRN  8/4/20 11:45


 8/5/20 11:44 DC  





 


 Propofol  100 ml @ 


 As Directed  STK-MED ONCE  8/7/20 15:09


 8/7/20 15:10 DC  





 


 Sodium Bicarbonate


  (Sodium Bicarb


 Adult 8.4% Syr)  50 meq  1X  ONCE  8/7/20 18:15


 8/7/20 18:16 DC 8/7/20 18:17


50 MEQ


 


 Sodium Chloride  1,000 ml @ 


 25 mls/hr  Q24H  8/7/20 15:04


     





 


 Sterile Water


  (WATER for RESP)  1,000 ml  CONT  PRN  8/6/20 11:45


    8/7/20 08:22


1,000 ML


 


 Succinylcholine


 Chloride


  (Anectine)  200 mg  STK-MED ONCE  8/7/20 15:17


 8/7/20 15:18 DC  





 


 Thrombin  20,000 unit  1X  ONCE  8/7/20 17:00


 8/7/20 17:01 DC  





 


 Vecuronium Bromide


  (Norcuron Bolus)  6 mg  PRN Q6HRS  PRN  8/7/20 15:15


    8/7/20 15:46


6 MG








Lab





Laboratory Tests








Test


 8/7/20


11:33 8/7/20


16:51 8/7/20


17:36 8/7/20


17:40


 


Glucose (Fingerstick)


 280 mg/dL


(70-99) 


 233 mg/dL


(70-99) 





 


Urine Collection Type  Unknown   


 


Urine Color  Yellow   


 


Urine Clarity  Clear   


 


Urine pH  7.0 (<5.0-8.0)   


 


Urine Specific Gravity


 


 1.015


(1.000-1.030) 


 





 


Urine Protein


 


 >=300 mg/dL


(NEG-TRACE) 


 





 


Urine Glucose (UA)


 


 250 mg/dL


(NEG) 


 





 


Urine Ketones (Stick)


 


 Negative mg/dL


(NEG) 


 





 


Urine Blood  Moderate (NEG)   


 


Urine Nitrite  Negative (NEG)   


 


Urine Bilirubin  Negative (NEG)   


 


Urine Urobilinogen Dipstick


 


 1.0 mg/dL (0.2


mg/dL) 


 





 


Urine Leukocyte Esterase  Negative (NEG)   


 


Urine RBC  0 /HPF (0-2)   


 


Urine WBC


 


 5-10 /HPF


(0-4) 


 





 


Urine Squamous Epithelial


Cells 


 Few /LPF 


 


 





 


Urine Amorphous Sediment  Present /HPF   


 


Urine Bacteria


 


 Mod /HPF


(0-FEW) 


 





 


Urine Hyaline Casts


 


 Occasional


/HPF 


 





 


Urine Mucus  Slight /LPF   


 


White Blood Count


 


 


 


 15.7 x10^3/uL


(4.0-11.0)


 


Red Blood Count


 


 


 


 4.55 x10^6/uL


(4.30-5.70)


 


Hemoglobin


 


 


 


 12.0 g/dL


(13.0-17.5)


 


Hematocrit


 


 


 


 36.4 %


(39.0-53.0)


 


Mean Corpuscular Volume    80 fL () 


 


Mean Corpuscular Hemoglobin    26 pg (25-35) 


 


Mean Corpuscular Hemoglobin


Concent 


 


 


 33 g/dL


(31-37)


 


Red Cell Distribution Width


 


 


 


 14.8 %


(11.5-14.5)


 


Platelet Count


 


 


 


 255 x10^3/uL


(140-400)


 


Prothrombin Time


 


 


 


 14.0 SEC


(11.7-14.0)


 


Prothromb Time International


Ratio 


 


 


 1.1 (0.8-1.1) 





 


Fibrinogen


 


 


 


 400 mg/dL


(200-440)


 


Sodium Level


 


 


 


 135 mmol/L


(136-145)


 


Potassium Level


 


 


 


 4.8 mmol/L


(3.5-5.1)


 


Chloride Level


 


 


 


 100 mmol/L


()


 


Carbon Dioxide Level


 


 


 


 30 mmol/L


(21-32)


 


Anion Gap    5 (6-14) 


 


Blood Urea Nitrogen


 


 


 


 48 mg/dL


(8-26)


 


Creatinine


 


 


 


 1.7 mg/dL


(0.7-1.3)


 


Estimated GFR


(Cockcroft-Gault) 


 


 


 54.0 





 


BUN/Creatinine Ratio    28 (6-20) 


 


Glucose Level


 


 


 


 244 mg/dL


(70-99)


 


Calcium Level


 


 


 


 7.5 mg/dL


(8.5-10.1)


 


Total Bilirubin


 


 


 


 0.4 mg/dL


(0.2-1.0)


 


Aspartate Amino Transf


(AST/SGOT) 


 


 


 40 U/L (15-37) 





 


Alanine Aminotransferase


(ALT/SGPT) 


 


 


 24 U/L (16-63) 





 


Alkaline Phosphatase


 


 


 


 148 U/L


()


 


Total Protein


 


 


 


 6.4 g/dL


(6.4-8.2)


 


Albumin


 


 


 


 2.1 g/dL


(3.4-5.0)


 


Albumin/Globulin Ratio    0.5 (1.0-1.7) 


 


Test


 8/7/20


20:45 8/8/20


00:17 8/8/20


06:00 8/8/20


08:45


 


Glucose (Fingerstick)


 241 mg/dL


(70-99) 235 mg/dL


(70-99) 


 





 


Sodium Level


 


 


 139 mmol/L


(136-145) 





 


Potassium Level


 


 


 5.0 mmol/L


(3.5-5.1) 





 


Chloride Level


 


 


 101 mmol/L


() 





 


Carbon Dioxide Level


 


 


 31 mmol/L


(21-32) 





 


Anion Gap   7 (6-14)  


 


Blood Urea Nitrogen


 


 


 58 mg/dL


(8-26) 





 


Creatinine


 


 


 1.6 mg/dL


(0.7-1.3) 





 


Estimated GFR


(Cockcroft-Gault) 


 


 57.9 


 





 


Glucose Level


 


 


 172 mg/dL


(70-99) 





 


Calcium Level


 


 


 7.8 mg/dL


(8.5-10.1) 





 


Phosphorus Level


 


 


 5.7 mg/dL


(2.6-4.7) 





 


Albumin


 


 


 2.2 g/dL


(3.4-5.0) 





 


O2 Saturation    98 % (92-99) 


 


Arterial Blood pH


 


 


 


 7.33


(7.35-7.45)


 


Arterial Blood pCO2 at


Patient Temp 


 


 


 56 mmHg


(35-46)


 


Arterial Blood pO2 at Patient


Temp 


 


 


 128 mmHg


()


 


Arterial Blood HCO3


 


 


 


 28 mmol/L


(21-28)


 


Arterial Blood Base Excess


 


 


 


 1 mmol/L


(-3-3)


 


FiO2    100 








Results


All relevant outside records, renal labs, imaging studies, telemetry/EKG's were 

reviewed.


Other


Comparison study is dated 8/4/2020.


 


An ET tube has been placed. The tip of this tube overlies the trachea at 


the level of clavicles. An NG tube has been placed. The tip of this tube 


is off these radiographs in the region of the body the stomach. A right 


internal jugular central venous catheter has been placed. The tip of this 


catheter extends to overlie the superior vena cava. The cardiac silhouette


is borderline enlarged. The thoracic aorta is minimally tortuous. 


Bilateral perihilar infiltrates, left greater than right are seen which 


have increased slightly. No pneumothorax or pleural effusion is noted. The


osseous structures are unchanged.


 


Impression: Tube and line position as discussed above. No pneumothorax is 


seen.





Justicifation of Admission Dx:


Justifications for Admission:


Justification of Admission Dx:  Yes


Respiratory Failure:  Non-Cardiac Pulm Edema











DAYRON SALINAS MD                Aug 8, 2020 11:15

## 2020-08-08 NOTE — PDOC
PULMONARY PROGRESS NOTES


DATE: 8/8/20 


TIME: 06:42


Subjective


intubated 8/7, on vent, sedated on versed, fentanyl, precedex, small ett 

secretion, on peep 10, fio2 60%


Vitals





Vital Signs








  Date Time  Temp Pulse Resp B/P (MAP) Pulse Ox O2 Delivery O2 Flow Rate FiO2


 


8/8/20 06:00  81 26 118/75 (89) 99 Ventilator  


 


8/8/20 04:00 99.4       





 99.4       


 


8/7/20 15:15       40.0 








Comments


ros   unable to obtain  intubated   sedated








on vent, 


sedated


no paradoxical abd motion


no audible wheezing


rrr


no edema


no rash


General:  Mild Distress


Labs





Laboratory Tests








Test


 8/6/20


07:26 8/6/20


12:10 8/6/20


12:12 8/6/20


16:30


 


Glucose (Fingerstick)


 330 mg/dL


(70-99) 


 325 mg/dL


(70-99) 





 


White Blood Count


 


 15.7 x10^3/uL


(4.0-11.0) 


 





 


Red Blood Count


 


 4.81 x10^6/uL


(4.30-5.70) 


 





 


Hemoglobin


 


 12.6 g/dL


(13.0-17.5) 


 





 


Hematocrit


 


 38.5 %


(39.0-53.0) 


 





 


Mean Corpuscular Volume  80 fL ()   


 


Mean Corpuscular Hemoglobin  26 pg (25-35)   


 


Mean Corpuscular Hemoglobin


Concent 


 33 g/dL


(31-37) 


 





 


Red Cell Distribution Width


 


 14.6 %


(11.5-14.5) 


 





 


Platelet Count


 


 269 x10^3/uL


(140-400) 


 





 


D-Dimer (Radha)


 


 4.67 ug/mlFEU


(0.00-0.50) 


 





 


Sodium Level


 


 131 mmol/L


(136-145) 


 





 


Potassium Level


 


 4.3 mmol/L


(3.5-5.1) 


 





 


Chloride Level


 


 97 mmol/L


() 


 





 


Carbon Dioxide Level


 


 28 mmol/L


(21-32) 


 





 


Anion Gap  6 (6-14)   


 


Blood Urea Nitrogen


 


 42 mg/dL


(8-26) 


 





 


Creatinine


 


 1.7 mg/dL


(0.7-1.3) 


 





 


Estimated GFR


(Cockcroft-Gault) 


 54.0 


 


 





 


BUN/Creatinine Ratio  25 (6-20)   


 


Glucose Level


 


 351 mg/dL


(70-99) 


 





 


Calcium Level


 


 8.1 mg/dL


(8.5-10.1) 


 





 


Magnesium Level


 


 2.6 mg/dL


(1.8-2.4) 


 





 


Ferritin


 


 969 ng/mL


() 


 





 


Total Bilirubin


 


 0.5 mg/dL


(0.2-1.0) 


 





 


Aspartate Amino Transf


(AST/SGOT) 


 42 U/L (15-37) 


 


 





 


Alanine Aminotransferase


(ALT/SGPT) 


 24 U/L (16-63) 


 


 





 


Alkaline Phosphatase


 


 127 U/L


() 


 





 


Creatine Kinase


 


 290 U/L


() 


 





 


Total Protein


 


 6.7 g/dL


(6.4-8.2) 


 





 


Albumin


 


 2.2 g/dL


(3.4-5.0) 


 





 


Albumin/Globulin Ratio  0.5 (1.0-1.7)   


 


O2 Saturation    91 % (92-99) 


 


Arterial Blood pH


 


 


 


 7.43


(7.35-7.45)


 


Arterial Blood pCO2 at


Patient Temp 


 


 


 37 mmHg


(35-46)


 


Arterial Blood pO2 at Patient


Temp 


 


 


 60 mmHg


()


 


Arterial Blood HCO3


 


 


 


 25 mmol/L


(21-28)


 


Arterial Blood Base Excess


 


 


 


 1 mmol/L


(-3-3)


 


FiO2    60% 


 


Test


 8/6/20


17:19 8/6/20


20:43 8/7/20


05:25 8/7/20


08:00


 


Glucose (Fingerstick)


 320 mg/dL


(70-99) 299 mg/dL


(70-99) 


 





 


White Blood Count


 


 


 15.3 x10^3/uL


(4.0-11.0) 





 


Red Blood Count


 


 


 4.68 x10^6/uL


(4.30-5.70) 





 


Hemoglobin


 


 


 12.2 g/dL


(13.0-17.5) 





 


Hematocrit


 


 


 37.6 %


(39.0-53.0) 





 


Mean Corpuscular Volume   80 fL ()  


 


Mean Corpuscular Hemoglobin   26 pg (25-35)  


 


Mean Corpuscular Hemoglobin


Concent 


 


 33 g/dL


(31-37) 





 


Red Cell Distribution Width


 


 


 15.0 %


(11.5-14.5) 





 


Platelet Count


 


 


 282 x10^3/uL


(140-400) 





 


Neutrophils (%) (Auto)   84 % (31-73)  


 


Lymphocytes (%) (Auto)   6 % (24-48)  


 


Monocytes (%) (Auto)   10 % (0-9)  


 


Eosinophils (%) (Auto)   0 % (0-3)  


 


Basophils (%) (Auto)   0 % (0-3)  


 


Neutrophils # (Auto)


 


 


 12.8 x10^3/uL


(1.8-7.7) 





 


Lymphocytes # (Auto)


 


 


 0.9 x10^3/uL


(1.0-4.8) 





 


Monocytes # (Auto)


 


 


 1.5 x10^3/uL


(0.0-1.1) 





 


Eosinophils # (Auto)


 


 


 0.0 x10^3/uL


(0.0-0.7) 





 


Basophils # (Auto)


 


 


 0.0 x10^3/uL


(0.0-0.2) 





 


Sodium Level


 


 


 132 mmol/L


(136-145) 





 


Potassium Level


 


 


 4.3 mmol/L


(3.5-5.1) 





 


Chloride Level


 


 


 98 mmol/L


() 





 


Carbon Dioxide Level


 


 


 29 mmol/L


(21-32) 





 


Anion Gap   5 (6-14)  


 


Blood Urea Nitrogen


 


 


 42 mg/dL


(8-26) 





 


Creatinine


 


 


 1.5 mg/dL


(0.7-1.3) 





 


Estimated GFR


(Cockcroft-Gault) 


 


 62.4 


 





 


BUN/Creatinine Ratio   28 (6-20)  


 


Glucose Level


 


 


 284 mg/dL


(70-99) 





 


Calcium Level


 


 


 8.1 mg/dL


(8.5-10.1) 





 


Total Bilirubin


 


 


 0.4 mg/dL


(0.2-1.0) 





 


Aspartate Amino Transf


(AST/SGOT) 


 


 37 U/L (15-37) 


 





 


Alanine Aminotransferase


(ALT/SGPT) 


 


 20 U/L (16-63) 


 





 


Alkaline Phosphatase


 


 


 139 U/L


() 





 


Total Protein


 


 


 6.7 g/dL


(6.4-8.2) 





 


Albumin


 


 


 2.2 g/dL


(3.4-5.0) 





 


Albumin/Globulin Ratio   0.5 (1.0-1.7)  


 


O2 Saturation    92 % (92-99) 


 


Arterial Blood pH


 


 


 


 7.41


(7.35-7.45)


 


Arterial Blood pCO2 at


Patient Temp 


 


 


 32 mmHg


(35-46)


 


Arterial Blood pO2 at Patient


Temp 


 


 


 63 mmHg


()


 


Arterial Blood HCO3


 


 


 


 20 mmol/L


(21-28)


 


Arterial Blood Base Excess


 


 


 


 -4 mmol/L


(-3-3)


 


FiO2    Vapotherm 90% 


 


Test


 8/7/20


08:46 8/7/20


11:33 8/7/20


16:51 8/7/20


17:36


 


Glucose (Fingerstick)


 289 mg/dL


(70-99) 280 mg/dL


(70-99) 


 233 mg/dL


(70-99)


 


Urine Collection Type   Unknown  


 


Urine Color   Yellow  


 


Urine Clarity   Clear  


 


Urine pH   7.0 (<5.0-8.0)  


 


Urine Specific Gravity


 


 


 1.015


(1.000-1.030) 





 


Urine Protein


 


 


 >=300 mg/dL


(NEG-TRACE) 





 


Urine Glucose (UA)


 


 


 250 mg/dL


(NEG) 





 


Urine Ketones (Stick)


 


 


 Negative mg/dL


(NEG) 





 


Urine Blood   Moderate (NEG)  


 


Urine Nitrite   Negative (NEG)  


 


Urine Bilirubin   Negative (NEG)  


 


Urine Urobilinogen Dipstick


 


 


 1.0 mg/dL (0.2


mg/dL) 





 


Urine Leukocyte Esterase   Negative (NEG)  


 


Urine RBC   0 /HPF (0-2)  


 


Urine WBC


 


 


 5-10 /HPF


(0-4) 





 


Urine Squamous Epithelial


Cells 


 


 Few /LPF 


 





 


Urine Amorphous Sediment   Present /HPF  


 


Urine Bacteria


 


 


 Mod /HPF


(0-FEW) 





 


Urine Hyaline Casts


 


 


 Occasional


/HPF 





 


Urine Mucus   Slight /LPF  


 


Test


 8/7/20


17:40 8/7/20


20:45 8/8/20


00:17 8/8/20


06:00


 


White Blood Count


 15.7 x10^3/uL


(4.0-11.0) 


 


 





 


Red Blood Count


 4.55 x10^6/uL


(4.30-5.70) 


 


 





 


Hemoglobin


 12.0 g/dL


(13.0-17.5) 


 


 





 


Hematocrit


 36.4 %


(39.0-53.0) 


 


 





 


Mean Corpuscular Volume 80 fL ()    


 


Mean Corpuscular Hemoglobin 26 pg (25-35)    


 


Mean Corpuscular Hemoglobin


Concent 33 g/dL


(31-37) 


 


 





 


Red Cell Distribution Width


 14.8 %


(11.5-14.5) 


 


 





 


Platelet Count


 255 x10^3/uL


(140-400) 


 


 





 


Prothrombin Time


 14.0 SEC


(11.7-14.0) 


 


 





 


Prothromb Time International


Ratio 1.1 (0.8-1.1) 


 


 


 





 


Fibrinogen


 400 mg/dL


(200-440) 


 


 





 


Sodium Level


 135 mmol/L


(136-145) 


 


 139 mmol/L


(136-145)


 


Potassium Level


 4.8 mmol/L


(3.5-5.1) 


 


 5.0 mmol/L


(3.5-5.1)


 


Chloride Level


 100 mmol/L


() 


 


 101 mmol/L


()


 


Carbon Dioxide Level


 30 mmol/L


(21-32) 


 


 31 mmol/L


(21-32)


 


Anion Gap 5 (6-14)    7 (6-14) 


 


Blood Urea Nitrogen


 48 mg/dL


(8-26) 


 


 58 mg/dL


(8-26)


 


Creatinine


 1.7 mg/dL


(0.7-1.3) 


 


 1.6 mg/dL


(0.7-1.3)


 


Estimated GFR


(Cockcroft-Gault) 54.0 


 


 


 57.9 





 


BUN/Creatinine Ratio 28 (6-20)    


 


Glucose Level


 244 mg/dL


(70-99) 


 


 172 mg/dL


(70-99)


 


Calcium Level


 7.5 mg/dL


(8.5-10.1) 


 


 7.8 mg/dL


(8.5-10.1)


 


Total Bilirubin


 0.4 mg/dL


(0.2-1.0) 


 


 





 


Aspartate Amino Transf


(AST/SGOT) 40 U/L (15-37) 


 


 


 





 


Alanine Aminotransferase


(ALT/SGPT) 24 U/L (16-63) 


 


 


 





 


Alkaline Phosphatase


 148 U/L


() 


 


 





 


Total Protein


 6.4 g/dL


(6.4-8.2) 


 


 





 


Albumin


 2.1 g/dL


(3.4-5.0) 


 


 2.2 g/dL


(3.4-5.0)


 


Albumin/Globulin Ratio 0.5 (1.0-1.7)    


 


Glucose (Fingerstick)


 


 241 mg/dL


(70-99) 235 mg/dL


(70-99) 





 


Phosphorus Level


 


 


 


 5.7 mg/dL


(2.6-4.7)








Laboratory Tests








Test


 8/7/20


08:00 8/7/20


08:46 8/7/20


11:33 8/7/20


16:51


 


O2 Saturation 92 % (92-99)    


 


Arterial Blood pH


 7.41


(7.35-7.45) 


 


 





 


Arterial Blood pCO2 at


Patient Temp 32 mmHg


(35-46) 


 


 





 


Arterial Blood pO2 at Patient


Temp 63 mmHg


() 


 


 





 


Arterial Blood HCO3


 20 mmol/L


(21-28) 


 


 





 


Arterial Blood Base Excess


 -4 mmol/L


(-3-3) 


 


 





 


FiO2 Vapotherm 90%    


 


Glucose (Fingerstick)


 


 289 mg/dL


(70-99) 280 mg/dL


(70-99) 





 


Urine Collection Type    Unknown 


 


Urine Color    Yellow 


 


Urine Clarity    Clear 


 


Urine pH    7.0 (<5.0-8.0) 


 


Urine Specific Gravity


 


 


 


 1.015


(1.000-1.030)


 


Urine Protein


 


 


 


 >=300 mg/dL


(NEG-TRACE)


 


Urine Glucose (UA)


 


 


 


 250 mg/dL


(NEG)


 


Urine Ketones (Stick)


 


 


 


 Negative mg/dL


(NEG)


 


Urine Blood    Moderate (NEG) 


 


Urine Nitrite    Negative (NEG) 


 


Urine Bilirubin    Negative (NEG) 


 


Urine Urobilinogen Dipstick


 


 


 


 1.0 mg/dL (0.2


mg/dL)


 


Urine Leukocyte Esterase    Negative (NEG) 


 


Urine RBC    0 /HPF (0-2) 


 


Urine WBC


 


 


 


 5-10 /HPF


(0-4)


 


Urine Squamous Epithelial


Cells 


 


 


 Few /LPF 





 


Urine Amorphous Sediment    Present /HPF 


 


Urine Bacteria


 


 


 


 Mod /HPF


(0-FEW)


 


Urine Hyaline Casts


 


 


 


 Occasional


/HPF


 


Urine Mucus    Slight /LPF 


 


Test


 8/7/20


17:36 8/7/20


17:40 8/7/20


20:45 8/8/20


00:17


 


Glucose (Fingerstick)


 233 mg/dL


(70-99) 


 241 mg/dL


(70-99) 235 mg/dL


(70-99)


 


White Blood Count


 


 15.7 x10^3/uL


(4.0-11.0) 


 





 


Red Blood Count


 


 4.55 x10^6/uL


(4.30-5.70) 


 





 


Hemoglobin


 


 12.0 g/dL


(13.0-17.5) 


 





 


Hematocrit


 


 36.4 %


(39.0-53.0) 


 





 


Mean Corpuscular Volume  80 fL ()   


 


Mean Corpuscular Hemoglobin  26 pg (25-35)   


 


Mean Corpuscular Hemoglobin


Concent 


 33 g/dL


(31-37) 


 





 


Red Cell Distribution Width


 


 14.8 %


(11.5-14.5) 


 





 


Platelet Count


 


 255 x10^3/uL


(140-400) 


 





 


Prothrombin Time


 


 14.0 SEC


(11.7-14.0) 


 





 


Prothromb Time International


Ratio 


 1.1 (0.8-1.1) 


 


 





 


Fibrinogen


 


 400 mg/dL


(200-440) 


 





 


Sodium Level


 


 135 mmol/L


(136-145) 


 





 


Potassium Level


 


 4.8 mmol/L


(3.5-5.1) 


 





 


Chloride Level


 


 100 mmol/L


() 


 





 


Carbon Dioxide Level


 


 30 mmol/L


(21-32) 


 





 


Anion Gap  5 (6-14)   


 


Blood Urea Nitrogen


 


 48 mg/dL


(8-26) 


 





 


Creatinine


 


 1.7 mg/dL


(0.7-1.3) 


 





 


Estimated GFR


(Cockcroft-Gault) 


 54.0 


 


 





 


BUN/Creatinine Ratio  28 (6-20)   


 


Glucose Level


 


 244 mg/dL


(70-99) 


 





 


Calcium Level


 


 7.5 mg/dL


(8.5-10.1) 


 





 


Total Bilirubin


 


 0.4 mg/dL


(0.2-1.0) 


 





 


Aspartate Amino Transf


(AST/SGOT) 


 40 U/L (15-37) 


 


 





 


Alanine Aminotransferase


(ALT/SGPT) 


 24 U/L (16-63) 


 


 





 


Alkaline Phosphatase


 


 148 U/L


() 


 





 


Total Protein


 


 6.4 g/dL


(6.4-8.2) 


 





 


Albumin


 


 2.1 g/dL


(3.4-5.0) 


 





 


Albumin/Globulin Ratio  0.5 (1.0-1.7)   


 


Test


 8/8/20


06:00 


 


 





 


Sodium Level


 139 mmol/L


(136-145) 


 


 





 


Potassium Level


 5.0 mmol/L


(3.5-5.1) 


 


 





 


Chloride Level


 101 mmol/L


() 


 


 





 


Carbon Dioxide Level


 31 mmol/L


(21-32) 


 


 





 


Anion Gap 7 (6-14)    


 


Blood Urea Nitrogen


 58 mg/dL


(8-26) 


 


 





 


Creatinine


 1.6 mg/dL


(0.7-1.3) 


 


 





 


Estimated GFR


(Cockcroft-Gault) 57.9 


 


 


 





 


Glucose Level


 172 mg/dL


(70-99) 


 


 





 


Calcium Level


 7.8 mg/dL


(8.5-10.1) 


 


 





 


Phosphorus Level


 5.7 mg/dL


(2.6-4.7) 


 


 





 


Albumin


 2.2 g/dL


(3.4-5.0) 


 


 











Medications





Active Scripts








 Medications  Dose


 Route/Sig


 Max Daily Dose Days Date Category


 


 Levemir (Insulin


 Detemir) 100


 Unit/1 Ml Vial  65 Unit


 SQ BID


   8/4/20 Reported


 


 Novolog (Insulin


 Aspart) 100


 Unit/1 Ml Vial  34 Unit


 SQ TID


   8/4/20 Reported


 


 Hydrochlorothiazide


 Tablet


  (Hydrochlorothiazide)


 12.5 Mg Tablet  12.5 Mg


 PO DAILY


   3/15/20 Rx








Comments


cxr 8/7 reviewed   b lat infilt l>r   ett ok





CT chest reviewed 


 


1.  Essentially nondiagnostic study for evaluation of pulmonary embolism 


due to contrast bolus timing. If persistent clinical concern, repeat CT 


angiogram or VQ scan can better assess.


2.  Multifocal ground glass opacities bilaterally predominantly within the


left upper and lower lobes, concerning for infection such as viral 


pneumonia, ARDS or asymmetric pulmonary edema. Recommend follow-up to 


ensure resolution.





Impression


.


1.  Acute hypoxic respiratory failure secondary to COVID-19 pneumonia and ALI.--

worsening HYPOXIA, intubated 8/7


2.  Abnormal CT chest consistent with pneumonia with ground glass opacities


bilaterally, poor contrast was received as a result pulmonary embolism could not


be ruled out, but he has low clinical suspicion for pulmonary embolism and as


such no further investigation is needed.


3.  Underlying morbid obesity. ? sherrell


4.  Diabetes.


5.  Hypertension.


6. MIAN


7. Hypertension





Plan


.


cont vent support, setting reviewed, titrate fio2 to keep sat 95%, decrease peep

as tolerated


abx per ID 


Continue IV steroids 


Monitor renal function 


s/p convalescent plasma, remdesivir, follow clinical course 


elevate hob


HTN per PCP


DM per PCP 





DVT/GI PPX 





 Discussed with RN. 


critically ill 


cct 30 min wo AIDA Mello MD                Aug 8, 2020 06:44

## 2020-08-08 NOTE — NUR
While doing a morning assessment, patient aroused and was forcefully trying to get out of 
bed. He had mitts on but was reaching for his ET tube. Multiple attempts made to reason and 
calm the patient but they were unsuccessful. Patient began to try and sit up and given his 
condition he was given a bolus of versed and fentanyl to help re-sedate. Patients O2 sat was 
in the 50's. Patients BP elevated prior to resedation. FIO2 on vent was increased to 100% to 
help reoxygenate. Patient became more relaxed and O2 saturation steadily increased to 99%. 
Scheduled ABG taken after this event. See vent settings for new changes. Patient has 
maintained sedated and is relaxed on vent.

## 2020-08-08 NOTE — NUR
2100 blood sugar 53.  Hypoglycemia protocol followed, D50 administered, re-check blood sugar 
113.  Notified Dr. Franco.  Will hold 2100 Lantus.  Orders received/reviewed.

## 2020-08-09 NOTE — NUR
~0200 pt. awoke from sedation suddenly, swatting at ETT with hands, mitts in place.  
Attempting to get up from bed, does not follow directions.  Required large bolus doses of 
sedatives to achieve adequate sedation.  Continuous infusions increased to prevent future 
episodes.

## 2020-08-09 NOTE — PDOC
DATE OF SERVICE:


DOS:


DATE: 8/9/20 


TIME: 12:31





SUBJECTIVE


ROS


F/up for MIAN/ ? CKD 








Remains intubated on the Vent





OBJECTIVE


Vital Signs





Vital Signs








  Date Time  Temp Pulse Resp B/P (MAP) Pulse Ox O2 Delivery O2 Flow Rate FiO2


 


8/9/20 12:17     97 Ventilator  


 


8/9/20 11:00  80 26 160/78 (105)    


 


8/9/20 08:00 101.1       





 101.1       








I & 0











Intake and Output 


 


 8/9/20





 07:00


 


Intake Total 1735.7 ml


 


Output Total 1665 ml


 


Balance 70.7 ml


 


 


 


IV Total 985.7 ml


 


Tube Feeding 750 ml


 


Output Urine Total 1665 ml











PHYSICAL EXAM


Physical Exam


GEN: Sedated intubated on the vent currently in no apparent distress, morbidly 

obese -American gentleman


Rest of the physical exam reviewed as documented by other providers and reviewed

with ICU nurse





DIAGNOSIS/ASSESSMENT


Assessment & Plan





Possible acute renal failure in the setting of COVID-19 positivity.  Covid 

associated manifestations cannot be ruled out.  Urine output is good/acceptable 

for the time being





Possible underlying chronic kidney disease due to exogenous obesity and diabetic

hypertensive nephrosclerosis cannot be ruled out





Marginal anemia: Watch trend for now





Proteinuria: Cannot rule out nephrotic syndrome with low albumin at 

presentation.  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

xogenous obesity.   quantitate with Ratio.





Abnormal appearing urine: Unclear if this is due to COVID-19 manifestation of 

the kidney versus underlying diabetic hypertensive nephrosclerosis.





COMMENT/RELEVANT DATA


Meds





Current Medications








 Medications


  (Trade)  Dose


 Ordered  Sig/Toni  Start Time


 Stop Time Status Last Admin


Dose Admin


 


 Acetaminophen


  (Tylenol)  650 mg  PRN Q6HRS  PRN  8/9/20 09:15


    8/9/20 10:56


650 MG


 


 Amino Acids/


 Glycerin/


 Electrolytes  1,000 ml @ 


 80 mls/hr  [id number]  8/8/20 12:45


   Cancel  





 


 Amlodipine


 Besylate


  (Norvasc)  10 mg  DAILY  8/6/20 10:00


 8/6/20 10:35 DC  





 


 Atropine Sulfate


  (ATROPINE 0.5mg


 SYRINGE)  0.5 mg  PRN Q5MIN  PRN  8/7/20 11:30


     





 


 Cefazolin Sodium


  (Ancef)  1 gm  1X  ONCE  8/4/20 13:15


 8/4/20 13:16 UNV  





 


 Dexmedetomidine


 HCl 400 mcg/


 Sodium Chloride  100 ml @ 0


 mls/hr  CONT  PRN  8/7/20 11:30


    8/9/20 09:40


18.4 MLS/HR


 


 Dextrose


  (Dextrose


 50%-Water Syringe)  12.5 gm  PRN Q15MIN  PRN  8/4/20 18:00


    8/8/20 21:00


12.5 GM


 


 Enoxaparin Sodium


  (Lovenox 40mg


 Syringe)  40 mg  Q12HR  8/7/20 10:00


    8/9/20 08:10


40 MG


 


 Fentanyl Citrate  55 ml @ 0


 mls/hr  CONT  PRN  8/7/20 19:00


    8/9/20 10:59


4 MLS/HR


 


 Glyburide


  (Diabeta)  5 mg  BIDWMEALS  8/5/20 17:00


 8/8/20 21:04 DC 8/8/20 17:34


5 MG


 


 Haloperidol


 Lactate


  (Haldol Inj)  5 mg  PRN Q3HRS  PRN  8/7/20 10:45


    8/7/20 10:48


5 MG


 


 Heparin Sodium


  (Porcine)


  (Heparin Sodium)  5,000 unit  Q8HRS  8/6/20 14:00


 8/7/20 09:18 DC 8/7/20 06:15


5,000 UNIT


 


 Hydralazine HCl


  (Apresoline Inj)  10 mg  PRN Q4HRS  PRN  8/7/20 08:00


    8/7/20 13:14


10 MG


 


 Info


  (CONTRAST GIVEN


 -- Rx MONITORING)  1 each  PRN DAILY  PRN  8/4/20 10:15


 8/6/20 10:14 DC  





 


 Insulin Glargine


  (Lantus Syringe)  20 unit  HS  8/9/20 21:00


     





 


 Insulin Human


 Lispro


  (HumaLOG)  38 units  Q6HRS  8/7/20 18:00


 8/8/20 21:04 DC 8/8/20 06:00


38 UNITS


 


 Iohexol


  (Omnipaque 350


 Mg/ml)  80 ml  1X  ONCE  8/4/20 10:15


 8/4/20 10:16 DC 8/4/20 10:30


80 ML


 


 Labetalol HCl


  (Normodyne Iv


 Push)  20 mg  PRN Q4HRS  PRN  8/7/20 08:00


     





 


 Lorazepam


  (Ativan Inj)  1 mg  PRN Q4HRS  PRN  8/7/20 10:45


 8/7/20 10:45 DC  





 


 Meropenem 500 mg/


 Sodium Chloride  50 ml @ 


 100 mls/hr  Q6HRS  8/9/20 12:00


    8/9/20 10:57


100 MLS/HR


 


 Methylprednisolone


 Sodium Succinate


  (SOLU-Medrol


 40MG VIAL)  40 mg  BID  8/4/20 22:00


    8/9/20 08:09


40 MG


 


 Metoprolol


 Tartrate


  (Lopressor)  100 mg  BID  8/7/20 09:00


    8/9/20 08:09


100 MG


 


 Midazolam HCl  100 ml @ 1


 mls/hr  CONT  PRN  8/7/20 15:15


    8/9/20 07:36


6 MLS/HR


 


 Multivitamins


  (Thera M Plus)  1 tab  DAILY  8/5/20 09:00


    8/9/20 08:09


1 TAB


 


 Naloxone HCl


  (Narcan)  0.4 mg  PRN Q2MIN  PRN  8/7/20 15:15


     





 


 Nicardipine HCl


 50 mg/Sodium


 Chloride  250 ml @ 


 25 mls/hr  CONT  PRN  8/7/20 04:15


    8/7/20 08:46


75 MLS/HR


 


 Non-Formulary


 Medication 1 ea/


 Sodium Chloride  230 ml @ 


 460 mls/hr  Q24H  8/7/20 09:00


 8/10/20 09:29  8/9/20 09:38


460 MLS/HR


 


 Ondansetron HCl


  (Zofran)  4 mg  PRN Q8HRS  PRN  8/4/20 11:45


 8/5/20 11:44 DC  





 


 Pantoprazole


 Sodium


  (PROTONIX VIAL


 for IV PUSH)  40 mg  DAILYAC  8/9/20 07:30


    8/9/20 08:09


40 MG


 


 Propofol  100 ml @ 


 As Directed  STK-MED ONCE  8/7/20 15:09


 8/7/20 15:10 DC  





 


 Sodium Bicarbonate


  (Sodium Bicarb


 Adult 8.4% Syr)  50 meq  1X  ONCE  8/7/20 18:15


 8/7/20 18:16 DC 8/7/20 18:17


50 MEQ


 


 Sodium Chloride  1,000 ml @ 


 25 mls/hr  Q24H  8/7/20 15:04


    8/8/20 17:35


25 MLS/HR


 


 Sterile Water


  (WATER for RESP)  1,000 ml  CONT  PRN  8/6/20 11:45


    8/7/20 08:22


1,000 ML


 


 Succinylcholine


 Chloride


  (Anectine)  200 mg  STK-MED ONCE  8/7/20 15:17


 8/7/20 15:18 DC  





 


 Thrombin  20,000 unit  1X  ONCE  8/7/20 17:00


 8/7/20 17:01 DC  





 


 Vecuronium Bromide


  (Norcuron Bolus)  6 mg  PRN Q6HRS  PRN  8/7/20 15:15


    8/7/20 15:46


6 MG








Lab





Laboratory Tests








Test


 8/8/20


12:33 8/8/20


17:45 8/8/20


20:56 8/8/20


21:22


 


Glucose (Fingerstick)


 90 mg/dL


(70-99) 81 mg/dL


(70-99) 58 mg/dL


(70-99) 133 mg/dL


(70-99)


 


Test


 8/9/20


00:34 8/9/20


05:15 8/9/20


05:18 8/9/20


08:29


 


Glucose (Fingerstick)


 80 mg/dL


(70-99) 


 99 mg/dL


(70-99) 123 mg/dL


(70-99)


 


White Blood Count


 


 17.2 x10^3/uL


(4.0-11.0) 


 





 


Red Blood Count


 


 4.67 x10^6/uL


(4.30-5.70) 


 





 


Hemoglobin


 


 12.2 g/dL


(13.0-17.5) 


 





 


Hematocrit


 


 37.7 %


(39.0-53.0) 


 





 


Mean Corpuscular Volume  81 fL ()   


 


Mean Corpuscular Hemoglobin  26 pg (25-35)   


 


Mean Corpuscular Hemoglobin


Concent 


 33 g/dL


(31-37) 


 





 


Red Cell Distribution Width


 


 15.1 %


(11.5-14.5) 


 





 


Platelet Count


 


 262 x10^3/uL


(140-400) 


 





 


Neutrophils (%) (Auto)  82 % (31-73)   


 


Lymphocytes (%) (Auto)  5 % (24-48)   


 


Monocytes (%) (Auto)  13 % (0-9)   


 


Eosinophils (%) (Auto)  0 % (0-3)   


 


Basophils (%) (Auto)  0 % (0-3)   


 


Neutrophils # (Auto)


 


 14.0 x10^3/uL


(1.8-7.7) 


 





 


Lymphocytes # (Auto)


 


 0.9 x10^3/uL


(1.0-4.8) 


 





 


Monocytes # (Auto)


 


 2.2 x10^3/uL


(0.0-1.1) 


 





 


Eosinophils # (Auto)


 


 0.0 x10^3/uL


(0.0-0.7) 


 





 


Basophils # (Auto)


 


 0.0 x10^3/uL


(0.0-0.2) 


 





 


Sodium Level


 


 141 mmol/L


(136-145) 


 





 


Potassium Level


 


 4.7 mmol/L


(3.5-5.1) 


 





 


Chloride Level


 


 104 mmol/L


() 


 





 


Carbon Dioxide Level


 


 32 mmol/L


(21-32) 


 





 


Anion Gap  5 (6-14)   


 


Blood Urea Nitrogen


 


 56 mg/dL


(8-26) 


 





 


Creatinine


 


 1.6 mg/dL


(0.7-1.3) 


 





 


Estimated GFR


(Cockcroft-Gault) 


 57.9 


 


 





 


BUN/Creatinine Ratio  35 (6-20)   


 


Glucose Level


 


 95 mg/dL


(70-99) 


 





 


Calcium Level


 


 7.9 mg/dL


(8.5-10.1) 


 





 


Total Bilirubin


 


 0.5 mg/dL


(0.2-1.0) 


 





 


Aspartate Amino Transf


(AST/SGOT) 


 21 U/L (15-37) 


 


 





 


Alanine Aminotransferase


(ALT/SGPT) 


 23 U/L (16-63) 


 


 





 


Alkaline Phosphatase


 


 108 U/L


() 


 





 


Total Protein


 


 6.0 g/dL


(6.4-8.2) 


 





 


Albumin


 


 2.1 g/dL


(3.4-5.0) 


 





 


Albumin/Globulin Ratio  0.5 (1.0-1.7)   


 


Test


 8/9/20


09:00 8/9/20


09:59 


 





 


O2 Saturation 88 % (92-99)    


 


Arterial Blood pH


 7.44


(7.35-7.45) 


 


 





 


Arterial Blood pCO2 at


Patient Temp 42 mmHg


(35-46) 


 


 





 


Arterial Blood pO2 at Patient


Temp 54 mmHg


() 


 


 





 


Arterial Blood HCO3


 27 mmol/L


(21-28) 


 


 





 


Arterial Blood Base Excess


 3 mmol/L


(-3-3) 


 


 





 


FiO2 40    


 


Urine Collection Type  Unknown   


 


Urine Color  Yellow   


 


Urine Clarity  Clear   


 


Urine pH  6.0 (<5.0-8.0)   


 


Urine Specific Gravity


 


 1.025


(1.000-1.030) 


 





 


Urine Protein


 


 100 mg/dL


(NEG-TRACE) 


 





 


Urine Glucose (UA)


 


 Negative mg/dL


(NEG) 


 





 


Urine Ketones (Stick)


 


 Negative mg/dL


(NEG) 


 





 


Urine Blood  Trace (NEG)   


 


Urine Nitrite  Negative (NEG)   


 


Urine Bilirubin  Negative (NEG)   


 


Urine Urobilinogen Dipstick


 


 2.0 mg/dL (0.2


mg/dL) 


 





 


Urine Leukocyte Esterase  Negative (NEG)   


 


Urine RBC  1-2 /HPF (0-2)   


 


Urine WBC  0 /HPF (0-4)   


 


Urine Squamous Epithelial


Cells 


 Few /LPF 


 


 





 


Urine Bacteria  0 /HPF (0-FEW)   








Results


All relevant outside records, renal labs, imaging studies, telemetry/EKG's were 

reviewed.





Justicifation of Admission Dx:


Justifications for Admission:


Justification of Admission Dx:  Yes


Respiratory Failure:  Non-Cardiac Pulm Edema











DAYRON SALINAS MD                Aug 9, 2020 12:34

## 2020-08-09 NOTE — PDOC
Infectious Disease Note


Subjective


Subjective


Fevers Tmax 101.5


Remains intubated, FiO2 down to 40% PEEP 9


Sedated





ROS


ROS


unobtainable





Vital Sign


Vital Signs





Vital Signs








  Date Time  Temp Pulse Resp B/P (MAP) Pulse Ox O2 Delivery O2 Flow Rate FiO2


 


8/9/20 08:09  81  120/68    


 


8/9/20 07:00   26  95 Ventilator  


 


8/9/20 04:00 101.5       





 101.5       











Physical Exam


PHYSICAL EXAM


GENERAL: Propped up in bed, orally intubated and sedated. + mitts 


HEENT:  Pupils equal. ETT and OGT in place 


NECK:  Supple


LUNGS:  Clear.


HEART:  S1, S2 regular.


ABDOMEN:  Obese, bowel sounds present, soft


: Hopkins in place 


EXTREMITIES:  No edema or cyanosis. SCDs bilaterally. 


SKIN:  No signs of rash. 


NEUROLOGIC:  Sedated


RIJ (8/7) without signs of complications





Labs


Lab





Laboratory Tests








Test


 8/8/20


12:33 8/8/20


17:45 8/8/20


20:56 8/8/20


21:22


 


Glucose (Fingerstick)


 90 mg/dL


(70-99) 81 mg/dL


(70-99) 58 mg/dL


(70-99) 133 mg/dL


(70-99)


 


Test


 8/9/20


00:34 8/9/20


05:15 8/9/20


05:18 8/9/20


08:29


 


Glucose (Fingerstick)


 80 mg/dL


(70-99) 


 99 mg/dL


(70-99) 123 mg/dL


(70-99)


 


White Blood Count


 


 17.2 x10^3/uL


(4.0-11.0) 


 





 


Red Blood Count


 


 4.67 x10^6/uL


(4.30-5.70) 


 





 


Hemoglobin


 


 12.2 g/dL


(13.0-17.5) 


 





 


Hematocrit


 


 37.7 %


(39.0-53.0) 


 





 


Mean Corpuscular Volume  81 fL ()   


 


Mean Corpuscular Hemoglobin  26 pg (25-35)   


 


Mean Corpuscular Hemoglobin


Concent 


 33 g/dL


(31-37) 


 





 


Red Cell Distribution Width


 


 15.1 %


(11.5-14.5) 


 





 


Platelet Count


 


 262 x10^3/uL


(140-400) 


 





 


Neutrophils (%) (Auto)  82 % (31-73)   


 


Lymphocytes (%) (Auto)  5 % (24-48)   


 


Monocytes (%) (Auto)  13 % (0-9)   


 


Eosinophils (%) (Auto)  0 % (0-3)   


 


Basophils (%) (Auto)  0 % (0-3)   


 


Neutrophils # (Auto)


 


 14.0 x10^3/uL


(1.8-7.7) 


 





 


Lymphocytes # (Auto)


 


 0.9 x10^3/uL


(1.0-4.8) 


 





 


Monocytes # (Auto)


 


 2.2 x10^3/uL


(0.0-1.1) 


 





 


Eosinophils # (Auto)


 


 0.0 x10^3/uL


(0.0-0.7) 


 





 


Basophils # (Auto)


 


 0.0 x10^3/uL


(0.0-0.2) 


 





 


Sodium Level


 


 141 mmol/L


(136-145) 


 





 


Potassium Level


 


 4.7 mmol/L


(3.5-5.1) 


 





 


Chloride Level


 


 104 mmol/L


() 


 





 


Carbon Dioxide Level


 


 32 mmol/L


(21-32) 


 





 


Anion Gap  5 (6-14)   


 


Blood Urea Nitrogen


 


 56 mg/dL


(8-26) 


 





 


Creatinine


 


 1.6 mg/dL


(0.7-1.3) 


 





 


Estimated GFR


(Cockcroft-Gault) 


 57.9 


 


 





 


BUN/Creatinine Ratio  35 (6-20)   


 


Glucose Level


 


 95 mg/dL


(70-99) 


 





 


Calcium Level


 


 7.9 mg/dL


(8.5-10.1) 


 





 


Total Bilirubin


 


 0.5 mg/dL


(0.2-1.0) 


 





 


Aspartate Amino Transf


(AST/SGOT) 


 21 U/L (15-37) 


 


 





 


Alanine Aminotransferase


(ALT/SGPT) 


 23 U/L (16-63) 


 


 





 


Alkaline Phosphatase


 


 108 U/L


() 


 





 


Total Protein


 


 6.0 g/dL


(6.4-8.2) 


 





 


Albumin


 


 2.1 g/dL


(3.4-5.0) 


 





 


Albumin/Globulin Ratio  0.5 (1.0-1.7)   











Objective


Assessment


New fever 


COVID-19 positive, 7/29. s/p convalescent plasma 


Bilateral pulmonary infiltrates.


Leukocytosis, on steroids, increased 


Allergy to Zosyn. h/o hives and swelling. 


Acute respiratory failure s/p intubation, 8/7


Renal insufficiency  


Diabetes.


Hypertension.


Obesity.





Plan


Plan of Care


Pancultures and dose meropenem with new fever and increase WBC.


Repeat CBC in am 


Remdesivir and steroids


s/p convalescent plasma 


Maintain aspiration precautions


Airborne isolation for COVID-19


Discussed with nursing





Critically ill


Attending Co-Sign


The patient was seen and interviewed as well as examined at the bedside. The 

chart was reviewed. The case was discussed. Agree with the plan of care.











MOLLY ARECHIGA         Aug 9, 2020 09:19


CALIXTO SALINAS MD                Aug 9, 2020 10:36

## 2020-08-09 NOTE — PDOC
PULMONARY PROGRESS NOTES


DATE: 8/9/20 


TIME: 06:12


Subjective


intubated 8/7, on vent, 


sedated on versed, fentanyl, precedex, agitated at times, small ett secretion, 

on peep 9, fio2 40%, febrile


Vitals





Vital Signs








  Date Time  Temp Pulse Resp B/P (MAP) Pulse Ox O2 Delivery O2 Flow Rate FiO2


 


8/9/20 06:00  80 26 124/68 (86) 97 Ventilator  


 


8/9/20 04:00 101.5       





 101.5       








Comments


ros   unable to obtain  intubated   sedated








on vent, 


sedated


no paradoxical abd motion


no audible wheezing


rrr


no edema


no rash


General:  Mild Distress


Labs





Laboratory Tests








Test


 8/7/20


08:00 8/7/20


08:46 8/7/20


11:33 8/7/20


16:51


 


O2 Saturation 92 % (92-99)    


 


Arterial Blood pH


 7.41


(7.35-7.45) 


 


 





 


Arterial Blood pCO2 at


Patient Temp 32 mmHg


(35-46) 


 


 





 


Arterial Blood pO2 at Patient


Temp 63 mmHg


() 


 


 





 


Arterial Blood HCO3


 20 mmol/L


(21-28) 


 


 





 


Arterial Blood Base Excess


 -4 mmol/L


(-3-3) 


 


 





 


FiO2 Vapotherm 90%    


 


Glucose (Fingerstick)


 


 289 mg/dL


(70-99) 280 mg/dL


(70-99) 





 


Urine Collection Type    Unknown 


 


Urine Color    Yellow 


 


Urine Clarity    Clear 


 


Urine pH    7.0 (<5.0-8.0) 


 


Urine Specific Gravity


 


 


 


 1.015


(1.000-1.030)


 


Urine Protein


 


 


 


 >=300 mg/dL


(NEG-TRACE)


 


Urine Glucose (UA)


 


 


 


 250 mg/dL


(NEG)


 


Urine Ketones (Stick)


 


 


 


 Negative mg/dL


(NEG)


 


Urine Blood    Moderate (NEG) 


 


Urine Nitrite    Negative (NEG) 


 


Urine Bilirubin    Negative (NEG) 


 


Urine Urobilinogen Dipstick


 


 


 


 1.0 mg/dL (0.2


mg/dL)


 


Urine Leukocyte Esterase    Negative (NEG) 


 


Urine RBC    0 /HPF (0-2) 


 


Urine WBC


 


 


 


 5-10 /HPF


(0-4)


 


Urine Squamous Epithelial


Cells 


 


 


 Few /LPF 





 


Urine Amorphous Sediment    Present /HPF 


 


Urine Bacteria


 


 


 


 Mod /HPF


(0-FEW)


 


Urine Hyaline Casts


 


 


 


 Occasional


/HPF


 


Urine Mucus    Slight /LPF 


 


Test


 8/7/20


17:36 8/7/20


17:40 8/7/20


20:45 8/8/20


00:17


 


Glucose (Fingerstick)


 233 mg/dL


(70-99) 


 241 mg/dL


(70-99) 235 mg/dL


(70-99)


 


White Blood Count


 


 15.7 x10^3/uL


(4.0-11.0) 


 





 


Red Blood Count


 


 4.55 x10^6/uL


(4.30-5.70) 


 





 


Hemoglobin


 


 12.0 g/dL


(13.0-17.5) 


 





 


Hematocrit


 


 36.4 %


(39.0-53.0) 


 





 


Mean Corpuscular Volume  80 fL ()   


 


Mean Corpuscular Hemoglobin  26 pg (25-35)   


 


Mean Corpuscular Hemoglobin


Concent 


 33 g/dL


(31-37) 


 





 


Red Cell Distribution Width


 


 14.8 %


(11.5-14.5) 


 





 


Platelet Count


 


 255 x10^3/uL


(140-400) 


 





 


Prothrombin Time


 


 14.0 SEC


(11.7-14.0) 


 





 


Prothromb Time International


Ratio 


 1.1 (0.8-1.1) 


 


 





 


Fibrinogen


 


 400 mg/dL


(200-440) 


 





 


Sodium Level


 


 135 mmol/L


(136-145) 


 





 


Potassium Level


 


 4.8 mmol/L


(3.5-5.1) 


 





 


Chloride Level


 


 100 mmol/L


() 


 





 


Carbon Dioxide Level


 


 30 mmol/L


(21-32) 


 





 


Anion Gap  5 (6-14)   


 


Blood Urea Nitrogen


 


 48 mg/dL


(8-26) 


 





 


Creatinine


 


 1.7 mg/dL


(0.7-1.3) 


 





 


Estimated GFR


(Cockcroft-Gault) 


 54.0 


 


 





 


BUN/Creatinine Ratio  28 (6-20)   


 


Glucose Level


 


 244 mg/dL


(70-99) 


 





 


Calcium Level


 


 7.5 mg/dL


(8.5-10.1) 


 





 


Total Bilirubin


 


 0.4 mg/dL


(0.2-1.0) 


 





 


Aspartate Amino Transf


(AST/SGOT) 


 40 U/L (15-37) 


 


 





 


Alanine Aminotransferase


(ALT/SGPT) 


 24 U/L (16-63) 


 


 





 


Alkaline Phosphatase


 


 148 U/L


() 


 





 


Total Protein


 


 6.4 g/dL


(6.4-8.2) 


 





 


Albumin


 


 2.1 g/dL


(3.4-5.0) 


 





 


Albumin/Globulin Ratio  0.5 (1.0-1.7)   


 


Test


 8/8/20


06:00 8/8/20


06:05 8/8/20


08:45 8/8/20


12:33


 


Sodium Level


 139 mmol/L


(136-145) 


 


 





 


Potassium Level


 5.0 mmol/L


(3.5-5.1) 


 


 





 


Chloride Level


 101 mmol/L


() 


 


 





 


Carbon Dioxide Level


 31 mmol/L


(21-32) 


 


 





 


Anion Gap 7 (6-14)    


 


Blood Urea Nitrogen


 58 mg/dL


(8-26) 


 


 





 


Creatinine


 1.6 mg/dL


(0.7-1.3) 


 


 





 


Estimated GFR


(Cockcroft-Gault) 57.9 


 


 


 





 


Glucose Level


 172 mg/dL


(70-99) 


 


 





 


Calcium Level


 7.8 mg/dL


(8.5-10.1) 


 


 





 


Phosphorus Level


 5.7 mg/dL


(2.6-4.7) 


 


 





 


Albumin


 2.2 g/dL


(3.4-5.0) 


 


 





 


Glucose (Fingerstick)


 


 170 mg/dL


(70-99) 


 90 mg/dL


(70-99)


 


O2 Saturation   98 % (92-99)  


 


Arterial Blood pH


 


 


 7.33


(7.35-7.45) 





 


Arterial Blood pCO2 at


Patient Temp 


 


 56 mmHg


(35-46) 





 


Arterial Blood pO2 at Patient


Temp 


 


 128 mmHg


() 





 


Arterial Blood HCO3


 


 


 28 mmol/L


(21-28) 





 


Arterial Blood Base Excess


 


 


 1 mmol/L


(-3-3) 





 


FiO2   100  


 


Test


 8/8/20


17:45 8/8/20


20:56 8/8/20


21:22 8/9/20


00:34


 


Glucose (Fingerstick)


 81 mg/dL


(70-99) 58 mg/dL


(70-99) 133 mg/dL


(70-99) 80 mg/dL


(70-99)








Laboratory Tests








Test


 8/8/20


08:45 8/8/20


12:33 8/8/20


17:45 8/8/20


20:56


 


O2 Saturation 98 % (92-99)    


 


Arterial Blood pH


 7.33


(7.35-7.45) 


 


 





 


Arterial Blood pCO2 at


Patient Temp 56 mmHg


(35-46) 


 


 





 


Arterial Blood pO2 at Patient


Temp 128 mmHg


() 


 


 





 


Arterial Blood HCO3


 28 mmol/L


(21-28) 


 


 





 


Arterial Blood Base Excess


 1 mmol/L


(-3-3) 


 


 





 


FiO2 100    


 


Glucose (Fingerstick)


 


 90 mg/dL


(70-99) 81 mg/dL


(70-99) 58 mg/dL


(70-99)


 


Test


 8/8/20


21:22 8/9/20


00:34 


 





 


Glucose (Fingerstick)


 133 mg/dL


(70-99) 80 mg/dL


(70-99) 


 











Medications





Active Scripts








 Medications  Dose


 Route/Sig


 Max Daily Dose Days Date Category


 


 Levemir (Insulin


 Detemir) 100


 Unit/1 Ml Vial  65 Unit


 SQ BID


   8/4/20 Reported


 


 Novolog (Insulin


 Aspart) 100


 Unit/1 Ml Vial  34 Unit


 SQ TID


   8/4/20 Reported


 


 Hydrochlorothiazide


 Tablet


  (Hydrochlorothiazide)


 12.5 Mg Tablet  12.5 Mg


 PO DAILY


   3/15/20 Rx








Comments


cxr 8/7 reviewed   b lat infilt l>r   ett ok





CT chest reviewed 


 


1.  Essentially nondiagnostic study for evaluation of pulmonary embolism 


due to contrast bolus timing. If persistent clinical concern, repeat CT 


angiogram or VQ scan can better assess.


2.  Multifocal ground glass opacities bilaterally predominantly within the


left upper and lower lobes, concerning for infection such as viral 


pneumonia, ARDS or asymmetric pulmonary edema. Recommend follow-up to 


ensure resolution.





Impression


.


1.  Acute hypoxic respiratory failure secondary to COVID-19 pneumonia and ALI.--

worsening HYPOXIA, intubated 8/7


2.  Abnormal CT chest consistent with pneumonia with ground glass opacities


bilaterally, poor contrast was received as a result pulmonary embolism could not


be ruled out, but he has low clinical suspicion for pulmonary embolism and as


such no further investigation is needed.


3.  Underlying morbid obesity. ? sherrell


4.  Diabetes.


5.  Hypertension.


6. MIAN


7. Hypertension 


8. fever





Plan


.


cont vent support, setting reviewed, titrate fio2 to keep sat 94%, decrease peep

as tolerated


abx per ID, Pancultures and dose meropenem with new fever and increase WBC.


Continue IV steroids 


Monitor renal function 


s/p convalescent plasma, remdesivir, follow clinical course 


elevate hob


HTN per PCP


DM per PCP 





DVT/GI PPX 





 Discussed with RN and dr butterfield


critically ill 


cct 30 min wo AIDA Mello MD                Aug 9, 2020 06:13

## 2020-08-09 NOTE — PDOC
[facility] PROGRESS NOTE


Date of Service


DOS:


DATE: 8/9/20 


TIME: 09:23





Chief Complaint


Chief Complaint


Acute hypoxic respiratory failure secondary to COVID-19 pneumonia. s/p i

ntubation, 8/7


Bilateral pulmonary infiltrates - Abnormal CT chest consistent with pneumonia 

with ground glass opacities bilaterally, poor contrast, pulmonary embolism could

not be ruled out, but he has low clinical suspicion for pulmonary embolism and 

as such no further investigation is needed.


Underlying morbid obesity.


Diabetes.


Hypertension.


Sepsis - Fever and leukocytosis. Allergy to Zosyn.





CC time 39 minutes





History of Present Illness


History of Present Illness


Mr Dior is a 41-year-old morbidly obese male with a BMI of 51, DM2, HTN, 

diabetic foot ulcers who was brought into the hospital complaining of shortness 

of breath for 5 days prior to admit.  He was tested positive for SARS-CoV-2 

(COVID 19).  He had a cough, which has been nonproductive.  No headaches, no 

nausea, vomiting, no diarrhea, no dysuria, no focal weakness. Required 2L NCO2 

to maintain O2 saturations > 88%. He had CTA chest nondiagnostic for pulmonary 

embolism, but with multifocal ground glass opacities bilaterally predominantly 

in the left upper and lower lobes and concerning for COVID pneumonia. His T-max 

is 100.2.  Pulm and ID consulted, admitted for further care.





8/5: On 4L NCO2, asking to leave the hospital.


8/6: Resting on 15L NCO2, trying to take off O2. Transitioned to 35% vapotherm


8/7: Transferred to ICU for Vapotherm. ABG 63 on 90%.  Glucose in the 300s 

despite high dosing of insulin. No abdominal pain. S/p convalescent FFP


8/8: Afebrile. Seen on vent. He was intubated 8/7, on vent, sedated on versed, 

fentanyl, precedex, small ett secretion, on peep 10, fio2 60%.  Had a right IJ 

placed by anesthesia had bleeding for 15 to 20 minutes after placement.  Labs 

with WBC 15.7, Hb 12, platelets 255, , K5, BUN 58, CR 1.6, glucose 132, 

phosphorus 5.7.





Febrile 101.1 F.  Seen on vent AC mode tidal volume 600 FiO2 40%, PEEP of 9.  

Glucose trending downward.  Started on tube feeds.  Still with good urine 

output.





Plan:


Add Carbapenem per ID. +/- zyvox


Cont ICU, critically ill





Vitals/I&O


Vitals/I&O:





                                   Vital Signs








  Date Time  Temp Pulse Resp B/P (MAP) Pulse Ox O2 Delivery O2 Flow Rate FiO2


 


8/9/20 09:00  81 26 163/82 (109) 97 Ventilator  


 


8/9/20 08:00 101.1       





 101.1       














                                    I & O   


 


 8/8/20 8/8/20 8/9/20





 15:00 23:00 07:00


 


Intake Total 150 ml 911 ml 674.7 ml


 


Output Total 680 ml 590 ml 395 ml


 


Balance -530 ml 321 ml 279.7 ml











Physical Exam


Physical Exam:


GENERAL: Propped up in bed, orally intubated and sedated. + mitts 


HEENT:  Pupils equal. ETT and OGT in place 


NECK:  Supple


LUNGS:  Clear.


HEART:  S1, S2 regular.


ABDOMEN:  Obese, bowel sounds present, soft


: [place] in place 


EXTREMITIES:  No edema or cyanosis. SCDs bilaterally. 


SKIN:  No signs of rash. 


NEUROLOGIC:  Sedated


RIJ (8/7) without signs of complications


Heart:  Regular rate, Normal S1, Normal S2, No murmurs


Abdomen:  Normal bowel sounds, No masses


Extremities:  Normal pulses, No tenderness/swelling


Skin:  No significant lesion





Labs


Labs:





Laboratory Tests








Test


 8/8/20


12:33 8/8/20


17:45 8/8/20


20:56 8/8/20


21:22


 


Glucose (Fingerstick)


 90 mg/dL


(70-99) 81 mg/dL


(70-99) 58 mg/dL


(70-99) 133 mg/dL


(70-99)


 


Test


 8/9/20


00:34 8/9/20


05:15 8/9/20


05:18 8/9/20


08:29


 


Glucose (Fingerstick)


 80 mg/dL


(70-99) 


 99 mg/dL


(70-99) 123 mg/dL


(70-99)


 


White Blood Count


 


 17.2 x10^3/uL


(4.0-11.0) 


 





 


Red Blood Count


 


 4.67 x10^6/uL


(4.30-5.70) 


 





 


Hemoglobin


 


 12.2 g/dL


(13.0-17.5) 


 





 


Hematocrit


 


 37.7 %


(39.0-53.0) 


 





 


Mean Corpuscular Volume  81 fL ()   


 


Mean Corpuscular Hemoglobin  26 pg (25-35)   


 


Mean Corpuscular Hemoglobin


Concent 


 33 g/dL


(31-37) 


 





 


Red Cell Distribution Width


 


 15.1 %


(11.5-14.5) 


 





 


Platelet Count


 


 262 x10^3/uL


(140-400) 


 





 


Neutrophils (%) (Auto)  82 % (31-73)   


 


Lymphocytes (%) (Auto)  5 % (24-48)   


 


Monocytes (%) (Auto)  13 % (0-9)   


 


Eosinophils (%) (Auto)  0 % (0-3)   


 


Basophils (%) (Auto)  0 % (0-3)   


 


Neutrophils # (Auto)


 


 14.0 x10^3/uL


(1.8-7.7) 


 





 


Lymphocytes # (Auto)


 


 0.9 x10^3/uL


(1.0-4.8) 


 





 


Monocytes # (Auto)


 


 2.2 x10^3/uL


(0.0-1.1) 


 





 


Eosinophils # (Auto)


 


 0.0 x10^3/uL


(0.0-0.7) 


 





 


Basophils # (Auto)


 


 0.0 x10^3/uL


(0.0-0.2) 


 





 


Sodium Level


 


 141 mmol/L


(136-145) 


 





 


Potassium Level


 


 4.7 mmol/L


(3.5-5.1) 


 





 


Chloride Level


 


 104 mmol/L


() 


 





 


Carbon Dioxide Level


 


 32 mmol/L


(21-32) 


 





 


Anion Gap  5 (6-14)   


 


Blood Urea Nitrogen


 


 56 mg/dL


(8-26) 


 





 


Creatinine


 


 1.6 mg/dL


(0.7-1.3) 


 





 


Estimated GFR


(Cockcroft-Gault) 


 57.9 


 


 





 


BUN/Creatinine Ratio  35 (6-20)   


 


Glucose Level


 


 95 mg/dL


(70-99) 


 





 


Calcium Level


 


 7.9 mg/dL


(8.5-10.1) 


 





 


Total Bilirubin


 


 0.5 mg/dL


(0.2-1.0) 


 





 


Aspartate Amino Transf


(AST/SGOT) 


 21 U/L (15-37) 


 


 





 


Alanine Aminotransferase


(ALT/SGPT) 


 23 U/L (16-63) 


 


 





 


Alkaline Phosphatase


 


 108 U/L


() 


 





 


Total Protein


 


 6.0 g/dL


(6.4-8.2) 


 





 


Albumin


 


 2.1 g/dL


(3.4-5.0) 


 





 


Albumin/Globulin Ratio  0.5 (1.0-1.7)   











Assessment and Plan


Assessmemt and Plan


Problems


Medical Problems:


(1) COVID-19 virus detected


Status: Acute  











Comment


Review of Relevant


I have reviewed the following items joseph (where applicable) has been applied.


Medications:





Current Medications








 Medications


  (Trade)  Dose


 Ordered  Sig/Toni


 Route


 PRN Reason  Start Time


 Stop Time Status Last Admin


Dose Admin


 


 Pantoprazole


 Sodium


  (PROTONIX VIAL


 for IV PUSH)  40 mg  DAILYAC


 IVP


   8/9/20 07:30


    8/9/20 08:09














Justicifation of Admission Dx:


Justifications for Admission:


Justification of Admission Dx:  Yes


Respiratory Failure:  Non-Cardiac Pulm Edema











CHRISTOPHER GONZALEZ MD         Aug 9, 2020 09:25

## 2020-08-10 NOTE — NUR
SS following up with discharge planning. SS reviewed pt chart and discussed with pt RN. Pt 
currently on the vent. IV Meropenem. COVID19 positive. SS will continue to follow for 
discharge planning.

## 2020-08-10 NOTE — PDOC
PULMONARY PROGRESS NOTES


DATE: 8/10/20 


TIME: 10:09


Subjective


intubated 8/7, on vent, 


sedated on versed, fentanyl, precedex, agitated at times, small ett secretion, 

on peep 9, fio2 50%, febrile


Vitals





Vital Signs








  Date Time  Temp Pulse Resp B/P (MAP) Pulse Ox O2 Delivery O2 Flow Rate FiO2


 


8/10/20 10:00  77 25 166/90 (115) 98 Ventilator  


 


8/10/20 08:00 99.3       





 99.3       








Comments


ros   unable to obtain  intubated   sedated








on vent, 


sedated


no paradoxical abd motion


no audible wheezing


rrr


no edema


no rash


General:  Mild Distress


Labs





Laboratory Tests








Test


 8/8/20


12:33 8/8/20


17:45 8/8/20


20:56 8/8/20


21:22


 


Glucose (Fingerstick)


 90 mg/dL


(70-99) 81 mg/dL


(70-99) 58 mg/dL


(70-99) 133 mg/dL


(70-99)


 


Test


 8/9/20


00:34 8/9/20


05:15 8/9/20


05:18 8/9/20


08:29


 


Glucose (Fingerstick)


 80 mg/dL


(70-99) 


 99 mg/dL


(70-99) 123 mg/dL


(70-99)


 


White Blood Count


 


 17.2 x10^3/uL


(4.0-11.0) 


 





 


Red Blood Count


 


 4.67 x10^6/uL


(4.30-5.70) 


 





 


Hemoglobin


 


 12.2 g/dL


(13.0-17.5) 


 





 


Hematocrit


 


 37.7 %


(39.0-53.0) 


 





 


Mean Corpuscular Volume  81 fL ()   


 


Mean Corpuscular Hemoglobin  26 pg (25-35)   


 


Mean Corpuscular Hemoglobin


Concent 


 33 g/dL


(31-37) 


 





 


Red Cell Distribution Width


 


 15.1 %


(11.5-14.5) 


 





 


Platelet Count


 


 262 x10^3/uL


(140-400) 


 





 


Neutrophils (%) (Auto)  82 % (31-73)   


 


Lymphocytes (%) (Auto)  5 % (24-48)   


 


Monocytes (%) (Auto)  13 % (0-9)   


 


Eosinophils (%) (Auto)  0 % (0-3)   


 


Basophils (%) (Auto)  0 % (0-3)   


 


Neutrophils # (Auto)


 


 14.0 x10^3/uL


(1.8-7.7) 


 





 


Lymphocytes # (Auto)


 


 0.9 x10^3/uL


(1.0-4.8) 


 





 


Monocytes # (Auto)


 


 2.2 x10^3/uL


(0.0-1.1) 


 





 


Eosinophils # (Auto)


 


 0.0 x10^3/uL


(0.0-0.7) 


 





 


Basophils # (Auto)


 


 0.0 x10^3/uL


(0.0-0.2) 


 





 


Sodium Level


 


 141 mmol/L


(136-145) 


 





 


Potassium Level


 


 4.7 mmol/L


(3.5-5.1) 


 





 


Chloride Level


 


 104 mmol/L


() 


 





 


Carbon Dioxide Level


 


 32 mmol/L


(21-32) 


 





 


Anion Gap  5 (6-14)   


 


Blood Urea Nitrogen


 


 56 mg/dL


(8-26) 


 





 


Creatinine


 


 1.6 mg/dL


(0.7-1.3) 


 





 


Estimated GFR


(Cockcroft-Gault) 


 57.9 


 


 





 


BUN/Creatinine Ratio  35 (6-20)   


 


Glucose Level


 


 95 mg/dL


(70-99) 


 





 


Calcium Level


 


 7.9 mg/dL


(8.5-10.1) 


 





 


Total Bilirubin


 


 0.5 mg/dL


(0.2-1.0) 


 





 


Aspartate Amino Transf


(AST/SGOT) 


 21 U/L (15-37) 


 


 





 


Alanine Aminotransferase


(ALT/SGPT) 


 23 U/L (16-63) 


 


 





 


Alkaline Phosphatase


 


 108 U/L


() 


 





 


Total Protein


 


 6.0 g/dL


(6.4-8.2) 


 





 


Albumin


 


 2.1 g/dL


(3.4-5.0) 


 





 


Albumin/Globulin Ratio  0.5 (1.0-1.7)   


 


Test


 8/9/20


09:00 8/9/20


09:59 8/9/20


12:30 8/9/20


17:26


 


O2 Saturation 88 % (92-99)    


 


Arterial Blood pH


 7.44


(7.35-7.45) 


 


 





 


Arterial Blood pCO2 at


Patient Temp 42 mmHg


(35-46) 


 


 





 


Arterial Blood pO2 at Patient


Temp 54 mmHg


() 


 


 





 


Arterial Blood HCO3


 27 mmol/L


(21-28) 


 


 





 


Arterial Blood Base Excess


 3 mmol/L


(-3-3) 


 


 





 


FiO2 40    


 


Urine Collection Type  Unknown   


 


Urine Color  Yellow   


 


Urine Clarity  Clear   


 


Urine pH  6.0 (<5.0-8.0)   


 


Urine Specific Gravity


 


 1.025


(1.000-1.030) 


 





 


Urine Protein


 


 100 mg/dL


(NEG-TRACE) 


 





 


Urine Glucose (UA)


 


 Negative mg/dL


(NEG) 


 





 


Urine Ketones (Stick)


 


 Negative mg/dL


(NEG) 


 





 


Urine Blood  Trace (NEG)   


 


Urine Nitrite  Negative (NEG)   


 


Urine Bilirubin  Negative (NEG)   


 


Urine Urobilinogen Dipstick


 


 2.0 mg/dL (0.2


mg/dL) 


 





 


Urine Leukocyte Esterase  Negative (NEG)   


 


Urine RBC  1-2 /HPF (0-2)   


 


Urine WBC  0 /HPF (0-4)   


 


Urine Squamous Epithelial


Cells 


 Few /LPF 


 


 





 


Urine Bacteria  0 /HPF (0-FEW)   


 


Urine Random Creatinine


 


 159.3 mg/dL


(Not Establ.) 


 





 


Urine Random Total Protein


 


 80.8 mg/dL


(Not Establ.) 


 





 


Urine Protein/Creatinine Ratio


 


 507 mg/g


(0-200) 


 





 


Glucose (Fingerstick)


 


 


 160 mg/dL


(70-99) 220 mg/dL


(70-99)


 


Test


 8/9/20


21:16 8/10/20


00:01 8/10/20


05:16 8/10/20


06:00


 


Glucose (Fingerstick)


 249 mg/dL


(70-99) 273 mg/dL


(70-99) 378 mg/dL


(70-99) 





 


White Blood Count


 


 


 


 20.8 x10^3/uL


(4.0-11.0)


 


Red Blood Count


 


 


 


 4.57 x10^6/uL


(4.30-5.70)


 


Hemoglobin


 


 


 


 12.2 g/dL


(13.0-17.5)


 


Hematocrit


 


 


 


 37.2 %


(39.0-53.0)


 


Mean Corpuscular Volume    81 fL () 


 


Mean Corpuscular Hemoglobin    27 pg (25-35) 


 


Mean Corpuscular Hemoglobin


Concent 


 


 


 33 g/dL


(31-37)


 


Red Cell Distribution Width


 


 


 


 14.7 %


(11.5-14.5)


 


Platelet Count


 


 


 


 300 x10^3/uL


(140-400)


 


Neutrophils (%) (Auto)    83 % (31-73) 


 


Lymphocytes (%) (Auto)    4 % (24-48) 


 


Monocytes (%) (Auto)    12 % (0-9) 


 


Eosinophils (%) (Auto)    0 % (0-3) 


 


Basophils (%) (Auto)    1 % (0-3) 


 


Neutrophils # (Auto)


 


 


 


 17.2 x10^3/uL


(1.8-7.7)


 


Lymphocytes # (Auto)


 


 


 


 0.7 x10^3/uL


(1.0-4.8)


 


Monocytes # (Auto)


 


 


 


 2.6 x10^3/uL


(0.0-1.1)


 


Eosinophils # (Auto)


 


 


 


 0.0 x10^3/uL


(0.0-0.7)


 


Basophils # (Auto)


 


 


 


 0.2 x10^3/uL


(0.0-0.2)


 


Segmented Neutrophils %    79 % (35-66) 


 


Band Neutrophils %    3 % (0-9) 


 


Lymphocytes %    5 % (24-48) 


 


Atypical Lymphocytes %


(Manual) 


 


 


 2 % (0-0) 





 


Monocytes %    6 % (0-10) 


 


Metamyelocytes %    4 % (0-0) 


 


Myelocytes %    1 % (0-0) 


 


Platelet Estimate


 


 


 


 Adequate


(ADEQUATE)


 


Large Platelets    Occ 


 


Anisocytosis    Slight 


 


Sodium Level


 


 


 


 139 mmol/L


(136-145)


 


Potassium Level


 


 


 


 5.1 mmol/L


(3.5-5.1)


 


Chloride Level


 


 


 


 104 mmol/L


()


 


Carbon Dioxide Level


 


 


 


 28 mmol/L


(21-32)


 


Anion Gap    7 (6-14) 


 


Blood Urea Nitrogen


 


 


 


 53 mg/dL


(8-26)


 


Creatinine


 


 


 


 1.6 mg/dL


(0.7-1.3)


 


Estimated GFR


(Cockcroft-Gault) 


 


 


 57.9 





 


BUN/Creatinine Ratio    33 (6-20) 


 


Glucose Level


 


 


 


 371 mg/dL


(70-99)


 


Calcium Level


 


 


 


 7.6 mg/dL


(8.5-10.1)


 


Total Bilirubin


 


 


 


 0.6 mg/dL


(0.2-1.0)


 


Aspartate Amino Transf


(AST/SGOT) 


 


 


 19 U/L (15-37) 





 


Alanine Aminotransferase


(ALT/SGPT) 


 


 


 20 U/L (16-63) 





 


Alkaline Phosphatase


 


 


 


 101 U/L


()


 


Total Protein


 


 


 


 6.5 g/dL


(6.4-8.2)


 


Albumin


 


 


 


 1.8 g/dL


(3.4-5.0)


 


Albumin/Globulin Ratio    0.4 (1.0-1.7) 


 


Test


 8/10/20


07:50 


 


 





 


O2 Saturation 92 % (92-99)    


 


Arterial Blood pH


 7.44


(7.35-7.45) 


 


 





 


Arterial Blood pCO2 at


Patient Temp 36 mmHg


(35-46) 


 


 





 


Arterial Blood pO2 at Patient


Temp 64 mmHg


() 


 


 





 


Arterial Blood HCO3


 24 mmol/L


(21-28) 


 


 





 


Arterial Blood Base Excess


 0 mmol/L


(-3-3) 


 


 





 


FiO2 50    








Laboratory Tests








Test


 8/9/20


12:30 8/9/20


17:26 8/9/20


21:16 8/10/20


00:01


 


Glucose (Fingerstick)


 160 mg/dL


(70-99) 220 mg/dL


(70-99) 249 mg/dL


(70-99) 273 mg/dL


(70-99)


 


Test


 8/10/20


05:16 8/10/20


06:00 8/10/20


07:50 





 


Glucose (Fingerstick)


 378 mg/dL


(70-99) 


 


 





 


White Blood Count


 


 20.8 x10^3/uL


(4.0-11.0) 


 





 


Red Blood Count


 


 4.57 x10^6/uL


(4.30-5.70) 


 





 


Hemoglobin


 


 12.2 g/dL


(13.0-17.5) 


 





 


Hematocrit


 


 37.2 %


(39.0-53.0) 


 





 


Mean Corpuscular Volume  81 fL ()   


 


Mean Corpuscular Hemoglobin  27 pg (25-35)   


 


Mean Corpuscular Hemoglobin


Concent 


 33 g/dL


(31-37) 


 





 


Red Cell Distribution Width


 


 14.7 %


(11.5-14.5) 


 





 


Platelet Count


 


 300 x10^3/uL


(140-400) 


 





 


Neutrophils (%) (Auto)  83 % (31-73)   


 


Lymphocytes (%) (Auto)  4 % (24-48)   


 


Monocytes (%) (Auto)  12 % (0-9)   


 


Eosinophils (%) (Auto)  0 % (0-3)   


 


Basophils (%) (Auto)  1 % (0-3)   


 


Neutrophils # (Auto)


 


 17.2 x10^3/uL


(1.8-7.7) 


 





 


Lymphocytes # (Auto)


 


 0.7 x10^3/uL


(1.0-4.8) 


 





 


Monocytes # (Auto)


 


 2.6 x10^3/uL


(0.0-1.1) 


 





 


Eosinophils # (Auto)


 


 0.0 x10^3/uL


(0.0-0.7) 


 





 


Basophils # (Auto)


 


 0.2 x10^3/uL


(0.0-0.2) 


 





 


Segmented Neutrophils %  79 % (35-66)   


 


Band Neutrophils %  3 % (0-9)   


 


Lymphocytes %  5 % (24-48)   


 


Atypical Lymphocytes %


(Manual) 


 2 % (0-0) 


 


 





 


Monocytes %  6 % (0-10)   


 


Metamyelocytes %  4 % (0-0)   


 


Myelocytes %  1 % (0-0)   


 


Platelet Estimate


 


 Adequate


(ADEQUATE) 


 





 


Large Platelets  Occ   


 


Anisocytosis  Slight   


 


Sodium Level


 


 139 mmol/L


(136-145) 


 





 


Potassium Level


 


 5.1 mmol/L


(3.5-5.1) 


 





 


Chloride Level


 


 104 mmol/L


() 


 





 


Carbon Dioxide Level


 


 28 mmol/L


(21-32) 


 





 


Anion Gap  7 (6-14)   


 


Blood Urea Nitrogen


 


 53 mg/dL


(8-26) 


 





 


Creatinine


 


 1.6 mg/dL


(0.7-1.3) 


 





 


Estimated GFR


(Cockcroft-Gault) 


 57.9 


 


 





 


BUN/Creatinine Ratio  33 (6-20)   


 


Glucose Level


 


 371 mg/dL


(70-99) 


 





 


Calcium Level


 


 7.6 mg/dL


(8.5-10.1) 


 





 


Total Bilirubin


 


 0.6 mg/dL


(0.2-1.0) 


 





 


Aspartate Amino Transf


(AST/SGOT) 


 19 U/L (15-37) 


 


 





 


Alanine Aminotransferase


(ALT/SGPT) 


 20 U/L (16-63) 


 


 





 


Alkaline Phosphatase


 


 101 U/L


() 


 





 


Total Protein


 


 6.5 g/dL


(6.4-8.2) 


 





 


Albumin


 


 1.8 g/dL


(3.4-5.0) 


 





 


Albumin/Globulin Ratio  0.4 (1.0-1.7)   


 


O2 Saturation   92 % (92-99)  


 


Arterial Blood pH


 


 


 7.44


(7.35-7.45) 





 


Arterial Blood pCO2 at


Patient Temp 


 


 36 mmHg


(35-46) 





 


Arterial Blood pO2 at Patient


Temp 


 


 64 mmHg


() 





 


Arterial Blood HCO3


 


 


 24 mmol/L


(21-28) 





 


Arterial Blood Base Excess


 


 


 0 mmol/L


(-3-3) 





 


FiO2   50  








Medications





Active Scripts








 Medications  Dose


 Route/Sig


 Max Daily Dose Days Date Category


 


 Levemir (Insulin


 Detemir) 100


 Unit/1 Ml Vial  65 Unit


 SQ BID


   8/4/20 Reported


 


 Novolog (Insulin


 Aspart) 100


 Unit/1 Ml Vial  34 Unit


 SQ TID


   8/4/20 Reported


 


 Hydrochlorothiazide


 Tablet


  (Hydrochlorothiazide)


 12.5 Mg Tablet  12.5 Mg


 PO DAILY


   3/15/20 Rx








Comments


cxr 8/7 reviewed   b lat infilt l>r   ett ok





CT chest reviewed 


 


1.  Essentially nondiagnostic study for evaluation of pulmonary embolism 


due to contrast bolus timing. If persistent clinical concern, repeat CT 


angiogram or VQ scan can better assess.


2.  Multifocal ground glass opacities bilaterally predominantly within the


left upper and lower lobes, concerning for infection such as viral 


pneumonia, ARDS or asymmetric pulmonary edema. Recommend follow-up to 


ensure resolution.





Impression


.


1.  Acute hypoxic respiratory failure secondary to COVID-19 pneumonia and ALI.--

worsening HYPOXIA, intubated 8/7


2.  Abnormal CT chest consistent with pneumonia with ground glass opacities


bilaterally, poor contrast was received as a result pulmonary embolism could not


be ruled out, but he has low clinical suspicion for pulmonary embolism and as


such no further investigation is needed.


3.  Underlying morbid obesity. ? sherrell


4.  Diabetes.


5.  Hypertension.


6. MIAN


7. Hypertension 


8. fever





Plan


.


cont vent support, setting reviewed, titrate fio2 to keep sat 94%, decrease peep

as tolerated


abx per ID, Pancultures and dose meropenem with new fever and increase WBC.


Continue IV steroids 


Monitor renal function 


s/p convalescent plasma, remdesivir, follow clinical course 


elevate hob


HTN per PCP


DM per PCP 


cxr 8/10 reviewed.





DVT/GI PPX 





 Discussed with RN and dr butterfield


critically ill 


cct 30 min wo AZMA Lundberg MD                 Aug 10, 2020 10:11

## 2020-08-10 NOTE — PDOC
Infectious Disease Note


Subjective:


Subjective


Fevers Tmax 101.7


intubated/sedated





Vital Signs:


Vital Signs





Vital Signs








  Date Time  Temp Pulse Resp B/P (MAP) Pulse Ox O2 Delivery O2 Flow Rate FiO2


 


8/10/20 07:42     98 Ventilator  


 


8/10/20 06:00  79 26 149/79 (102)    


 


8/10/20 05:00 100.2       





 100.2       











Physical Exam:


PHYSICAL EXAM


GENERAL: Propped up in bed, orally intubated and sedated. + mitts 


HEENT:  Pupils equal. ETT and OGT in place 


NECK:  Supple


LUNGS:  Clear.


HEART:  S1, S2 regular.


ABDOMEN:  Obese, bowel sounds present, soft


: Hopkins in place 


EXTREMITIES:  No edema or cyanosis. SCDs bilaterally. 


SKIN:  No signs of rash. 


NEUROLOGIC:  Sedated


RIJ (8/7) without signs of complications





Medications:


Inpatient Meds:





Current Medications








 Medications


  (Trade)  Dose


 Ordered  Sig/Toni  Start Time


 Stop Time Status Last Admin


Dose Admin


 


 Acetaminophen


  (Tylenol)  650 mg  PRN Q6HRS  PRN  8/9/20 09:15


    8/10/20 05:49


650 MG


 


 Amino Acids/


 Glycerin/


 Electrolytes  1,000 ml @ 


 80 mls/hr  [id number]  8/8/20 12:45


   Cancel  





 


 Amlodipine


 Besylate


  (Norvasc)  10 mg  DAILY  8/6/20 10:00


 8/6/20 10:35 DC  





 


 Atropine Sulfate


  (ATROPINE 0.5mg


 SYRINGE)  0.5 mg  PRN Q5MIN  PRN  8/7/20 11:30


     





 


 Cefazolin Sodium


  (Ancef)  1 gm  1X  ONCE  8/4/20 13:15


 8/4/20 13:16 UNV  





 


 Dexmedetomidine


 HCl 400 mcg/


 Sodium Chloride  100 ml @ 0


 mls/hr  CONT  PRN  8/7/20 11:30


    8/10/20 07:00


18.4 MLS/HR


 


 Dextrose


  (Dextrose


 50%-Water Syringe)  12.5 gm  PRN Q15MIN  PRN  8/4/20 18:00


    8/8/20 21:00


12.5 GM


 


 Enoxaparin Sodium


  (Lovenox 40mg


 Syringe)  40 mg  Q12HR  8/7/20 10:00


    8/9/20 20:52


40 MG


 


 Fentanyl Citrate  55 ml @ 0


 mls/hr  CONT  PRN  8/7/20 19:00


    8/9/20 23:21


4 MLS/HR


 


 Glyburide


  (Diabeta)  5 mg  BIDWMEALS  8/5/20 17:00


 8/8/20 21:04 DC 8/8/20 17:34


5 MG


 


 Haloperidol


 Lactate


  (Haldol Inj)  5 mg  PRN Q3HRS  PRN  8/7/20 10:45


    8/7/20 10:48


5 MG


 


 Heparin Sodium


  (Porcine)


  (Heparin Sodium)  5,000 unit  Q8HRS  8/6/20 14:00


 8/7/20 09:18 DC 8/7/20 06:15


5,000 UNIT


 


 Hydralazine HCl


  (Apresoline Inj)  10 mg  PRN Q4HRS  PRN  8/7/20 08:00


    8/7/20 13:14


10 MG


 


 Info


  (CONTRAST GIVEN


 -- Rx MONITORING)  1 each  PRN DAILY  PRN  8/4/20 10:15


 8/6/20 10:14 DC  





 


 Insulin Glargine


  (Lantus Syringe)  20 unit  HS  8/9/20 21:00


    8/9/20 20:53


20 UNIT


 


 Insulin Human


 Lispro


  (HumaLOG)  38 units  Q6HRS  8/7/20 18:00


 8/8/20 21:04 DC 8/8/20 06:00


38 UNITS


 


 Iohexol


  (Omnipaque 350


 Mg/ml)  80 ml  1X  ONCE  8/4/20 10:15


 8/4/20 10:16 DC 8/4/20 10:30


80 ML


 


 Labetalol HCl


  (Normodyne Iv


 Push)  20 mg  PRN Q4HRS  PRN  8/7/20 08:00


     





 


 Lorazepam


  (Ativan Inj)  1 mg  PRN Q4HRS  PRN  8/7/20 10:45


 8/7/20 10:45 DC  





 


 Meropenem 500 mg/


 Sodium Chloride  50 ml @ 


 100 mls/hr  Q6HRS  8/9/20 12:00


    8/10/20 05:49


100 MLS/HR


 


 Methylprednisolone


 Sodium Succinate


  (SOLU-Medrol


 40MG VIAL)  40 mg  BID  8/4/20 22:00


    8/9/20 20:52


40 MG


 


 Metoprolol


 Tartrate


  (Lopressor)  100 mg  BID  8/7/20 09:00


    8/9/20 20:52


100 MG


 


 Midazolam HCl  100 ml @ 1


 mls/hr  CONT  PRN  8/7/20 15:15


    8/10/20 05:49


8 MLS/HR


 


 Multivitamins


  (Thera M Plus)  1 tab  DAILY  8/5/20 09:00


    8/9/20 08:09


1 TAB


 


 Naloxone HCl


  (Narcan)  0.4 mg  PRN Q2MIN  PRN  8/7/20 15:15


     





 


 Nicardipine HCl


 50 mg/Sodium


 Chloride  250 ml @ 


 25 mls/hr  CONT  PRN  8/7/20 04:15


    8/7/20 08:46


75 MLS/HR


 


 Non-Formulary


 Medication 1 ea/


 Sodium Chloride  230 ml @ 


 460 mls/hr  Q24H  8/7/20 09:00


 8/10/20 09:29  8/9/20 09:38


460 MLS/HR


 


 Ondansetron HCl


  (Zofran)  4 mg  PRN Q8HRS  PRN  8/4/20 11:45


 8/5/20 11:44 DC  





 


 Pantoprazole


 Sodium


  (PROTONIX VIAL


 for IV PUSH)  40 mg  DAILYAC  8/9/20 07:30


    8/9/20 08:09


40 MG


 


 Propofol  100 ml @ 


 As Directed  STK-MED ONCE  8/7/20 15:09


 8/7/20 15:10 DC  





 


 Sodium Bicarbonate


  (Sodium Bicarb


 Adult 8.4% Syr)  50 meq  1X  ONCE  8/7/20 18:15


 8/7/20 18:16 DC 8/7/20 18:17


50 MEQ


 


 Sodium Chloride  1,000 ml @ 


 25 mls/hr  Q24H  8/7/20 15:04


    8/8/20 17:35


25 MLS/HR


 


 Sterile Water


  (WATER for RESP)  1,000 ml  CONT  PRN  8/6/20 11:45


    8/7/20 08:22


1,000 ML


 


 Succinylcholine


 Chloride


  (Anectine)  200 mg  STK-MED ONCE  8/7/20 15:17


 8/7/20 15:18 DC  





 


 Thrombin  20,000 unit  1X  ONCE  8/7/20 17:00


 8/7/20 17:01 DC  





 


 Vecuronium Bromide


  (Norcuron Bolus)  6 mg  PRN Q6HRS  PRN  8/7/20 15:15


    8/10/20 02:19


6 MG











Labs:


Lab





Laboratory Tests








Test


 8/9/20


09:00 8/9/20


09:59 8/9/20


12:30 8/9/20


17:26


 


O2 Saturation 88 % (92-99)    


 


Arterial Blood pH


 7.44


(7.35-7.45) 


 


 





 


Arterial Blood pCO2 at


Patient Temp 42 mmHg


(35-46) 


 


 





 


Arterial Blood pO2 at Patient


Temp 54 mmHg


() 


 


 





 


Arterial Blood HCO3


 27 mmol/L


(21-28) 


 


 





 


Arterial Blood Base Excess


 3 mmol/L


(-3-3) 


 


 





 


FiO2 40    


 


Urine Collection Type  Unknown   


 


Urine Color  Yellow   


 


Urine Clarity  Clear   


 


Urine pH  6.0 (<5.0-8.0)   


 


Urine Specific Gravity


 


 1.025


(1.000-1.030) 


 





 


Urine Protein


 


 100 mg/dL


(NEG-TRACE) 


 





 


Urine Glucose (UA)


 


 Negative mg/dL


(NEG) 


 





 


Urine Ketones (Stick)


 


 Negative mg/dL


(NEG) 


 





 


Urine Blood  Trace (NEG)   


 


Urine Nitrite  Negative (NEG)   


 


Urine Bilirubin  Negative (NEG)   


 


Urine Urobilinogen Dipstick


 


 2.0 mg/dL (0.2


mg/dL) 


 





 


Urine Leukocyte Esterase  Negative (NEG)   


 


Urine RBC  1-2 /HPF (0-2)   


 


Urine WBC  0 /HPF (0-4)   


 


Urine Squamous Epithelial


Cells 


 Few /LPF 


 


 





 


Urine Bacteria  0 /HPF (0-FEW)   


 


Urine Random Creatinine


 


 159.3 mg/dL


(Not Establ.) 


 





 


Urine Random Total Protein


 


 80.8 mg/dL


(Not Establ.) 


 





 


Urine Protein/Creatinine Ratio


 


 507 mg/g


(0-200) 


 





 


Glucose (Fingerstick)


 


 


 160 mg/dL


(70-99) 220 mg/dL


(70-99)


 


Test


 8/9/20


21:16 8/10/20


00:01 8/10/20


05:16 8/10/20


06:00


 


Glucose (Fingerstick)


 249 mg/dL


(70-99) 273 mg/dL


(70-99) 378 mg/dL


(70-99) 





 


White Blood Count


 


 


 


 20.8 x10^3/uL


(4.0-11.0)


 


Red Blood Count


 


 


 


 4.57 x10^6/uL


(4.30-5.70)


 


Hemoglobin


 


 


 


 12.2 g/dL


(13.0-17.5)


 


Hematocrit


 


 


 


 37.2 %


(39.0-53.0)


 


Mean Corpuscular Volume    81 fL () 


 


Mean Corpuscular Hemoglobin    27 pg (25-35) 


 


Mean Corpuscular Hemoglobin


Concent 


 


 


 33 g/dL


(31-37)


 


Red Cell Distribution Width


 


 


 


 14.7 %


(11.5-14.5)


 


Platelet Count


 


 


 


 300 x10^3/uL


(140-400)


 


Neutrophils (%) (Auto)    83 % (31-73) 


 


Lymphocytes (%) (Auto)    4 % (24-48) 


 


Monocytes (%) (Auto)    12 % (0-9) 


 


Eosinophils (%) (Auto)    0 % (0-3) 


 


Basophils (%) (Auto)    1 % (0-3) 


 


Neutrophils # (Auto)


 


 


 


 17.2 x10^3/uL


(1.8-7.7)


 


Lymphocytes # (Auto)


 


 


 


 0.7 x10^3/uL


(1.0-4.8)


 


Monocytes # (Auto)


 


 


 


 2.6 x10^3/uL


(0.0-1.1)


 


Eosinophils # (Auto)


 


 


 


 0.0 x10^3/uL


(0.0-0.7)


 


Basophils # (Auto)


 


 


 


 0.2 x10^3/uL


(0.0-0.2)


 


Segmented Neutrophils %    79 % (35-66) 


 


Band Neutrophils %    3 % (0-9) 


 


Lymphocytes %    5 % (24-48) 


 


Atypical Lymphocytes %


(Manual) 


 


 


 2 % (0-0) 





 


Monocytes %    6 % (0-10) 


 


Metamyelocytes %    4 % (0-0) 


 


Myelocytes %    1 % (0-0) 


 


Platelet Estimate


 


 


 


 Adequate


(ADEQUATE)


 


Large Platelets    Occ 


 


Anisocytosis    Slight 


 


Sodium Level


 


 


 


 139 mmol/L


(136-145)


 


Potassium Level


 


 


 


 5.1 mmol/L


(3.5-5.1)


 


Chloride Level


 


 


 


 104 mmol/L


()


 


Carbon Dioxide Level


 


 


 


 28 mmol/L


(21-32)


 


Anion Gap    7 (6-14) 


 


Blood Urea Nitrogen


 


 


 


 53 mg/dL


(8-26)


 


Creatinine


 


 


 


 1.6 mg/dL


(0.7-1.3)


 


Estimated GFR


(Cockcroft-Gault) 


 


 


 57.9 





 


BUN/Creatinine Ratio    33 (6-20) 


 


Glucose Level


 


 


 


 371 mg/dL


(70-99)


 


Calcium Level


 


 


 


 7.6 mg/dL


(8.5-10.1)


 


Total Bilirubin


 


 


 


 0.6 mg/dL


(0.2-1.0)


 


Aspartate Amino Transf


(AST/SGOT) 


 


 


 19 U/L (15-37) 





 


Alanine Aminotransferase


(ALT/SGPT) 


 


 


 20 U/L (16-63) 





 


Alkaline Phosphatase


 


 


 


 101 U/L


()


 


Total Protein


 


 


 


 6.5 g/dL


(6.4-8.2)


 


Albumin


 


 


 


 1.8 g/dL


(3.4-5.0)


 


Albumin/Globulin Ratio    0.4 (1.0-1.7) 


 


Test


 8/10/20


07:50 


 


 





 


O2 Saturation 92 % (92-99)    


 


Arterial Blood pH


 7.44


(7.35-7.45) 


 


 





 


Arterial Blood pCO2 at


Patient Temp 36 mmHg


(35-46) 


 


 





 


Arterial Blood pO2 at Patient


Temp 64 mmHg


() 


 


 





 


Arterial Blood HCO3


 24 mmol/L


(21-28) 


 


 





 


Arterial Blood Base Excess


 0 mmol/L


(-3-3) 


 


 





 


FiO2 50    











Objective:


Assessment:


Fever


COVID-19 positive, 7/29. s/p convalescent plasma 


Bilateral pulmonary infiltrates.


Leukocytosis, on steroids, increased 


Allergy to Zosyn. h/o hives and swelling. 


Acute respiratory failure s/p intubation, 8/7


Renal insufficiency  


Diabetes.


Hypertension.


Obesity.





Plan:


Plan of Care


Cont Merrem 8/9


Remdesivir and steroids


s/p convalescent plasma 


Maintain aspiration precautions


Airborne isolation for COVID-19


Discussed with nursing





Critically ill











SATNAM SALINAS MD           Aug 10, 2020 08:50

## 2020-08-10 NOTE — PDOC
Renal-Progress Notes


Subjective Notes


Notes


ON THE VENT





History of Present Illness


Hx of present illness


STABLE





Vitals


Vitals





Vital Signs








  Date Time  Temp Pulse Resp B/P (MAP) Pulse Ox O2 Delivery O2 Flow Rate FiO2


 


8/10/20 11:02   27  98 Ventilator  


 


8/10/20 10:00  77  166/90 (115)    


 


8/10/20 08:00 99.3       





 99.3       








Weight


Weight [ ]





I.O.


Intake and Output











Intake and Output 


 


 8/10/20





 07:00


 


Intake Total 3897.55 ml


 


Output Total 2425 ml


 


Balance 1472.55 ml


 


 


 


IV Total 1227.55 ml


 


Tube Feeding 2095 ml


 


Other 575 ml


 


Output Urine Total 2425 ml











Labs


Labs





Laboratory Tests








Test


 8/9/20


12:30 8/9/20


17:26 8/9/20


21:16 8/10/20


00:01


 


Glucose (Fingerstick)


 160 mg/dL


(70-99) 220 mg/dL


(70-99) 249 mg/dL


(70-99) 273 mg/dL


(70-99)


 


Test


 8/10/20


05:16 8/10/20


06:00 8/10/20


07:50 





 


Glucose (Fingerstick)


 378 mg/dL


(70-99) 


 


 





 


White Blood Count


 


 20.8 x10^3/uL


(4.0-11.0) 


 





 


Red Blood Count


 


 4.57 x10^6/uL


(4.30-5.70) 


 





 


Hemoglobin


 


 12.2 g/dL


(13.0-17.5) 


 





 


Hematocrit


 


 37.2 %


(39.0-53.0) 


 





 


Mean Corpuscular Volume  81 fL ()   


 


Mean Corpuscular Hemoglobin  27 pg (25-35)   


 


Mean Corpuscular Hemoglobin


Concent 


 33 g/dL


(31-37) 


 





 


Red Cell Distribution Width


 


 14.7 %


(11.5-14.5) 


 





 


Platelet Count


 


 300 x10^3/uL


(140-400) 


 





 


Neutrophils (%) (Auto)  83 % (31-73)   


 


Lymphocytes (%) (Auto)  4 % (24-48)   


 


Monocytes (%) (Auto)  12 % (0-9)   


 


Eosinophils (%) (Auto)  0 % (0-3)   


 


Basophils (%) (Auto)  1 % (0-3)   


 


Neutrophils # (Auto)


 


 17.2 x10^3/uL


(1.8-7.7) 


 





 


Lymphocytes # (Auto)


 


 0.7 x10^3/uL


(1.0-4.8) 


 





 


Monocytes # (Auto)


 


 2.6 x10^3/uL


(0.0-1.1) 


 





 


Eosinophils # (Auto)


 


 0.0 x10^3/uL


(0.0-0.7) 


 





 


Basophils # (Auto)


 


 0.2 x10^3/uL


(0.0-0.2) 


 





 


Segmented Neutrophils %  79 % (35-66)   


 


Band Neutrophils %  3 % (0-9)   


 


Lymphocytes %  5 % (24-48)   


 


Atypical Lymphocytes %


(Manual) 


 2 % (0-0) 


 


 





 


Monocytes %  6 % (0-10)   


 


Metamyelocytes %  4 % (0-0)   


 


Myelocytes %  1 % (0-0)   


 


Platelet Estimate


 


 Adequate


(ADEQUATE) 


 





 


Large Platelets  Occ   


 


Anisocytosis  Slight   


 


Sodium Level


 


 139 mmol/L


(136-145) 


 





 


Potassium Level


 


 5.1 mmol/L


(3.5-5.1) 


 





 


Chloride Level


 


 104 mmol/L


() 


 





 


Carbon Dioxide Level


 


 28 mmol/L


(21-32) 


 





 


Anion Gap  7 (6-14)   


 


Blood Urea Nitrogen


 


 53 mg/dL


(8-26) 


 





 


Creatinine


 


 1.6 mg/dL


(0.7-1.3) 


 





 


Estimated GFR


(Cockcroft-Gault) 


 57.9 


 


 





 


BUN/Creatinine Ratio  33 (6-20)   


 


Glucose Level


 


 371 mg/dL


(70-99) 


 





 


Calcium Level


 


 7.6 mg/dL


(8.5-10.1) 


 





 


Total Bilirubin


 


 0.6 mg/dL


(0.2-1.0) 


 





 


Aspartate Amino Transf


(AST/SGOT) 


 19 U/L (15-37) 


 


 





 


Alanine Aminotransferase


(ALT/SGPT) 


 20 U/L (16-63) 


 


 





 


Alkaline Phosphatase


 


 101 U/L


() 


 





 


Total Protein


 


 6.5 g/dL


(6.4-8.2) 


 





 


Albumin


 


 1.8 g/dL


(3.4-5.0) 


 





 


Albumin/Globulin Ratio  0.4 (1.0-1.7)   


 


O2 Saturation   92 % (92-99)  


 


Arterial Blood pH


 


 


 7.44


(7.35-7.45) 





 


Arterial Blood pCO2 at


Patient Temp 


 


 36 mmHg


(35-46) 





 


Arterial Blood pO2 at Patient


Temp 


 


 64 mmHg


() 





 


Arterial Blood HCO3


 


 


 24 mmol/L


(21-28) 





 


Arterial Blood Base Excess


 


 


 0 mmol/L


(-3-3) 





 


FiO2   50  











Micro


Micro





Microbiology


8/9/20 Blood Culture - Preliminary, Resulted


         NO GROWTH AFTER 1 DAY





Review of Systems


Constitutional:  yes: other (UNABLE TO OBTAIN)





Physical Exam


General Appearance:  no apparent distress


Respiratory:  decreased breath sounds


Heart:  S1S2


Abdomen:  soft


Genitourinary:  bladder flat


Extremities:  pulses present





Assessment


Assessment


IMP





COVID 19 PNEUMONIA


ACUTE RESP FAILURE


DM II


HTN


CKD 3





PLAN





VENT SUPPORT


RENAL FXN HAS REMAINED STABLE


WILL FOLLOW











ADRIAN SALAMANCA MD                 Aug 10, 2020 11:43

## 2020-08-10 NOTE — RAD
AP chest x-ray

 

HISTORY: Intubated, Covid pneumonia.

 

Comparison: Chest x-ray August 7, 2020.

 

FINDINGS: Endotracheal tube tip upper thoracic trachea 6 cm above the 

sally. Nasogastric tube extends into the abdomen. Right jugular central 

venous catheter tip right paratracheal mediastinum general radiographic 

region lower SVC. Mild cardiomegaly stable. No pneumothorax. Mild 

hypoexpansion of the lungs relative the prior study may be related to 

ventilator settings or expiratory phase imaging. Decreased density and 

distribution of opacities at the upper lobes and decreased consolidation 

of the left upper lobe and lingula. Heterogeneous opacities at the lung 

bases mildly increased given less hypoinflation of the lungs, most likely 

greater atelectasis superimposed upon the underlying infiltrates.

 

IMPRESSION: Lines and tubes as described above. No pneumothorax. 

Improvement of the upper lung zone pulmonary infiltrates. Hyperexpansion 

of the lungs and opacities at the lung bases likely combination of 

atelectasis and underlying pulmonary infiltrates. See above.

 

Electronically signed by: Tristian Wyatt MD (8/10/2020 4:43 AM) 

[facility]

## 2020-08-10 NOTE — PDOC
[facility] [facility] PROGRESS NOTE


Date of Service


DOS:


DATE: 8/10/20 


TIME: 11:56





Chief Complaint


Chief Complaint


Acute hypoxic respiratory failure secondary to COVID-19 pneumonia. s/p 

intubation, 8/7


Bilateral pulmonary infiltrates - Abnormal CT chest consistent with pneumonia 

with ground glass opacities bilaterally, poor contrast, pulmonary embolism could

not be ruled out, but he has low clinical suspicion for pulmonary embolism and 

as such no further investigation is needed.


Underlying morbid obesity.


Diabetes.


Hypertension.


Sepsis - Fever and leukocytosis. Allergy to Zosyn.





CC time 39 minutes





History of Present Illness


History of Present Illness


08/10/2020


Patient is seen and examined in ICU


Patient is intubated, on vent AC/26/600/55% FiO2 and sedated on Precedex, Versed

and Fentanyl


Received plasma and Remdesivir


Patient had some fever


Discussed with RN


Charts reviewed





Mr Dior is a 41-year-old morbidly obese male with a BMI of 51, DM2, HTN, 

diabetic foot ulcers who was brought into the hospital complaining of shortness 

of breath for 5 days prior to admit.  He was tested positive for SARS-CoV-2 

(COVID 19).  He had a cough, which has been nonproductive.  No headaches, no 

nausea, vomiting, no diarrhea, no dysuria, no focal weakness. Required 2L NCO2 

to maintain O2 saturations > 88%. He had CTA chest nondiagnostic for pulmonary 

embolism, but with multifocal ground glass opacities bilaterally predominantly 

in the left upper and lower lobes and concerning for COVID pneumonia. His T-max 

is 100.2.  Pulm and ID consulted, admitted for further care.





8/5: On 4L NCO2, asking to leave the hospital.


8/6: Resting on 15L NCO2, trying to take off O2. Transitioned to 35% vapotherm


8/7: Transferred to ICU for Vapotherm. ABG 63 on 90%.  Glucose in the 300s 

despite high dosing of insulin. No abdominal pain. S/p convalescent FFP


8/8: Afebrile. Seen on vent. He was intubated 8/7, on vent, sedated on versed, 

fentanyl, precedex, small ett secretion, on peep 10, fio2 60%.  Had a right IJ 

placed by anesthesia had bleeding for 15 to 20 minutes after placement.  Labs 

with WBC 15.7, Hb 12, platelets 255, , K5, BUN 58, CR 1.6, glucose 132, 

phosphorus 5.7.





Febrile 101.1 F.  Seen on vent AC mode tidal volume 600 FiO2 40%, PEEP of 9.  

Glucose trending downward.  Started on tube feeds.  Still with good urine 

output.





Plan:


Add Carbapenem per ID. +/- zyvox


Cont ICU, critically ill





Vitals/I&O


Vitals/I&O:





                                   Vital Signs








  Date Time  Temp Pulse Resp B/P (MAP) Pulse Ox O2 Delivery O2 Flow Rate FiO2


 


8/10/20 11:34     96 Ventilator  


 


8/10/20 11:02   27     


 


8/10/20 11:00  76  169/88 (115)    


 


8/10/20 08:00 99.3       





 99.3       














                                    I & O   


 


 8/9/20 8/9/20 8/10/20





 15:00 23:00 07:00


 


Intake Total 300 ml 1095.55 ml 2502 ml


 


Output Total 855 ml 770 ml 800 ml


 


Balance -555 ml 325.55 ml 1702 ml











Physical Exam


Physical Exam:


GENERAL: Propped up in bed, orally intubated and sedated. + mitts 


HEENT:  Pupils equal. ETT and OGT in place 


NECK:  Supple


LUNGS:  Clear.


HEART:  S1, S2 regular.


ABDOMEN:  Obese, bowel sounds present, soft


: Hopkins in place 


EXTREMITIES:  No edema or cyanosis. SCDs bilaterally. 


SKIN:  No signs of rash. 


NEUROLOGIC:  Sedated


RIJ (8/7) without signs of complications


General:  Other (intubated and sedated)


Heart:  Regular rate, Normal S1, Normal S2, No murmurs


Abdomen:  Normal bowel sounds, No masses


Extremities:  Normal pulses, No tenderness/swelling


Skin:  No significant lesion





Labs


Labs:





Laboratory Tests








Test


 8/9/20


12:30 8/9/20


17:26 8/9/20


21:16 8/10/20


00:01


 


Glucose (Fingerstick)


 160 mg/dL


(70-99) 220 mg/dL


(70-99) 249 mg/dL


(70-99) 273 mg/dL


(70-99)


 


Test


 8/10/20


05:16 8/10/20


06:00 8/10/20


07:50 





 


Glucose (Fingerstick)


 378 mg/dL


(70-99) 


 


 





 


White Blood Count


 


 20.8 x10^3/uL


(4.0-11.0) 


 





 


Red Blood Count


 


 4.57 x10^6/uL


(4.30-5.70) 


 





 


Hemoglobin


 


 12.2 g/dL


(13.0-17.5) 


 





 


Hematocrit


 


 37.2 %


(39.0-53.0) 


 





 


Mean Corpuscular Volume  81 fL ()   


 


Mean Corpuscular Hemoglobin  27 pg (25-35)   


 


Mean Corpuscular Hemoglobin


Concent 


 33 g/dL


(31-37) 


 





 


Red Cell Distribution Width


 


 14.7 %


(11.5-14.5) 


 





 


Platelet Count


 


 300 x10^3/uL


(140-400) 


 





 


Neutrophils (%) (Auto)  83 % (31-73)   


 


Lymphocytes (%) (Auto)  4 % (24-48)   


 


Monocytes (%) (Auto)  12 % (0-9)   


 


Eosinophils (%) (Auto)  0 % (0-3)   


 


Basophils (%) (Auto)  1 % (0-3)   


 


Neutrophils # (Auto)


 


 17.2 x10^3/uL


(1.8-7.7) 


 





 


Lymphocytes # (Auto)


 


 0.7 x10^3/uL


(1.0-4.8) 


 





 


Monocytes # (Auto)


 


 2.6 x10^3/uL


(0.0-1.1) 


 





 


Eosinophils # (Auto)


 


 0.0 x10^3/uL


(0.0-0.7) 


 





 


Basophils # (Auto)


 


 0.2 x10^3/uL


(0.0-0.2) 


 





 


Segmented Neutrophils %  79 % (35-66)   


 


Band Neutrophils %  3 % (0-9)   


 


Lymphocytes %  5 % (24-48)   


 


Atypical Lymphocytes %


(Manual) 


 2 % (0-0) 


 


 





 


Monocytes %  6 % (0-10)   


 


Metamyelocytes %  4 % (0-0)   


 


Myelocytes %  1 % (0-0)   


 


Platelet Estimate


 


 Adequate


(ADEQUATE) 


 





 


Large Platelets  Occ   


 


Anisocytosis  Slight   


 


Sodium Level


 


 139 mmol/L


(136-145) 


 





 


Potassium Level


 


 5.1 mmol/L


(3.5-5.1) 


 





 


Chloride Level


 


 104 mmol/L


() 


 





 


Carbon Dioxide Level


 


 28 mmol/L


(21-32) 


 





 


Anion Gap  7 (6-14)   


 


Blood Urea Nitrogen


 


 53 mg/dL


(8-26) 


 





 


Creatinine


 


 1.6 mg/dL


(0.7-1.3) 


 





 


Estimated GFR


(Cockcroft-Gault) 


 57.9 


 


 





 


BUN/Creatinine Ratio  33 (6-20)   


 


Glucose Level


 


 371 mg/dL


(70-99) 


 





 


Calcium Level


 


 7.6 mg/dL


(8.5-10.1) 


 





 


Total Bilirubin


 


 0.6 mg/dL


(0.2-1.0) 


 





 


Aspartate Amino Transf


(AST/SGOT) 


 19 U/L (15-37) 


 


 





 


Alanine Aminotransferase


(ALT/SGPT) 


 20 U/L (16-63) 


 


 





 


Alkaline Phosphatase


 


 101 U/L


() 


 





 


Total Protein


 


 6.5 g/dL


(6.4-8.2) 


 





 


Albumin


 


 1.8 g/dL


(3.4-5.0) 


 





 


Albumin/Globulin Ratio  0.4 (1.0-1.7)   


 


O2 Saturation   92 % (92-99)  


 


Arterial Blood pH


 


 


 7.44


(7.35-7.45) 





 


Arterial Blood pCO2 at


Patient Temp 


 


 36 mmHg


(35-46) 





 


Arterial Blood pO2 at Patient


Temp 


 


 64 mmHg


() 





 


Arterial Blood HCO3


 


 


 24 mmol/L


(21-28) 





 


Arterial Blood Base Excess


 


 


 0 mmol/L


(-3-3) 





 


FiO2   50  











Assessment and Plan


Assessmemt and Plan


Problems


Medical Problems:


(1) COVID-19 virus detected


Status: Acute  





Acute hypoxic respiratory failure secondary to COVID-19 pneumonia. s/p 

intubation, 8/7


Bilateral pulmonary infiltrates - Abnormal CT chest consistent with pneumonia 

with ground glass opacities bilaterally, poor contrast, pulmonary embolism could

not be ruled out, but he has low clinical suspicion for pulmonary embolism and 

as such no further investigation is needed.


Underlying morbid obesity.


Diabetes.


Hypertension.


Sepsis - Fever and leukocytosis. Allergy to Zosyn.





Plan


ICU monitoring


Vent weaning


Mitts for patient safety


SCD


IV steroids


IV antibiotics


Full Code


Appreciate subspecialist input


He is critically ill


Total time 34 minutes








Comment


Review of Relevant


I have reviewed the following items joseph (where applicable) has been applied.


Medications:





Current Medications








 Medications


  (Trade)  Dose


 Ordered  Sig/Toni


 Route


 PRN Reason  Start Time


 Stop Time Status Last Admin


Dose Admin


 


 Meropenem 500 mg/


 Sodium Chloride  50 ml @ 


 100 mls/hr  Q6HRS


 IV


   8/9/20 12:00


    8/10/20 05:49





 


 Insulin Glargine


  (Lantus Syringe)  20 unit  HS


 SQ


   8/9/20 21:00


    8/9/20 20:53














Justicifation of Admission Dx:


Justifications for Admission:


Justification of Admission Dx:  Yes


Respiratory Failure:  Non-Cardiac Pulm Edema











JAIRO BUSCH III DO           Aug 10, 2020 12:12

## 2020-08-11 NOTE — PDOC
Renal-Progress Notes


Vitals


Vitals





Vital Signs








  Date Time  Temp Pulse Resp B/P (MAP) Pulse Ox O2 Delivery O2 Flow Rate FiO2


 


8/11/20 10:00  74 26 186/103 (130) 97 Ventilator  


 


8/11/20 08:00 98.4       





 98.4       


 


8/10/20 22:57       40.0 








Weight


Weight [ ]





I.O.


Intake and Output











Intake and Output 


 


 8/11/20





 07:00


 


Intake Total 4126 ml


 


Output Total 3583 ml


 


Balance 543 ml


 


 


 


IV Total 900 ml


 


Tube Feeding 2576 ml


 


Other 650 ml


 


Output Urine Total 3583 ml


 


Gastric Drainage Total 0 ml











Labs


Labs





Laboratory Tests








Test


 8/10/20


17:35 8/10/20


20:56 8/11/20


00:10 8/11/20


06:04


 


Glucose (Fingerstick)


 357 mg/dL


(70-99) 303 mg/dL


(70-99) 315 mg/dL


(70-99) 345 mg/dL


(70-99)


 


Test


 8/11/20


06:05 8/11/20


08:00 


 





 


Sodium Level


 139 mmol/L


(136-145) 


 


 





 


Potassium Level


 5.6 mmol/L


(3.5-5.1) 


 


 





 


Chloride Level


 107 mmol/L


() 


 


 





 


Carbon Dioxide Level


 27 mmol/L


(21-32) 


 


 





 


Anion Gap 5 (6-14)    


 


Blood Urea Nitrogen


 48 mg/dL


(8-26) 


 


 





 


Creatinine


 1.3 mg/dL


(0.7-1.3) 


 


 





 


Estimated GFR


(Cockcroft-Gault) 73.6 


 


 


 





 


BUN/Creatinine Ratio 37 (6-20)    


 


Glucose Level


 380 mg/dL


(70-99) 


 


 





 


Calcium Level


 8.1 mg/dL


(8.5-10.1) 


 


 





 


Total Bilirubin


 0.4 mg/dL


(0.2-1.0) 


 


 





 


Aspartate Amino Transf


(AST/SGOT) 14 U/L (15-37) 


 


 


 





 


Alanine Aminotransferase


(ALT/SGPT) 20 U/L (16-63) 


 


 


 





 


Alkaline Phosphatase


 89 U/L


() 


 


 





 


Total Protein


 6.4 g/dL


(6.4-8.2) 


 


 





 


Albumin


 1.7 g/dL


(3.4-5.0) 


 


 





 


Albumin/Globulin Ratio 0.4 (1.0-1.7)    


 


O2 Saturation  94 % (92-99)   


 


Arterial Blood pH


 


 7.45


(7.35-7.45) 


 





 


Arterial Blood pCO2 at


Patient Temp 


 34 mmHg


(35-46) 


 





 


Arterial Blood pO2 at Patient


Temp 


 70 mmHg


() 


 





 


Arterial Blood HCO3


 


 23 mmol/L


(21-28) 


 





 


Arterial Blood Base Excess


 


 -1 mmol/L


(-3-3) 


 





 


FiO2  50   











Micro


Micro





Microbiology


8/9/20 Blood Culture - Preliminary, Resulted


         NO GROWTH AFTER 2 DAYS





Review of Systems


Constitutional:  yes: other (UNABLE TO OBTAIN)





Physical Exam


General Appearance:  no apparent distress


Respiratory:  decreased breath sounds


Heart:  S1S2


Abdomen:  soft


Genitourinary:  bladder flat


Extremities:  pulses present





Assessment


Assessment


IMP





MILD HYPERKALEMIA


COVID 19 PNEUMONIA


ACUTE RESP FAILURE


DM II


HTN


CKD 3





PLAN





VENT SUPPORT


RENAL FXN HAS REMAINED STABLE


LABS IN AM


WILL FOLLOW











ADRIAN SALAMANCA MD                 Aug 11, 2020 11:02

## 2020-08-11 NOTE — PDOC
Renal-Progress Notes


Subjective Notes


Notes


ON THE VENT





History of Present Illness


Hx of present illness


NO CHANGE





Vitals


Vitals





Vital Signs








  Date Time  Temp Pulse Resp B/P (MAP) Pulse Ox O2 Delivery O2 Flow Rate FiO2


 


8/11/20 10:00  74 26 186/103 (130) 97 Ventilator  


 


8/11/20 08:00 98.4       





 98.4       


 


8/10/20 22:57       40.0 








Weight


Weight [ ]





I.O.


Intake and Output











Intake and Output 


 


 8/11/20





 07:00


 


Intake Total 4126 ml


 


Output Total 3583 ml


 


Balance 543 ml


 


 


 


IV Total 900 ml


 


Tube Feeding 2576 ml


 


Other 650 ml


 


Output Urine Total 3583 ml


 


Gastric Drainage Total 0 ml











Labs


Labs





Laboratory Tests








Test


 8/10/20


17:35 8/10/20


20:56 8/11/20


00:10 8/11/20


06:04


 


Glucose (Fingerstick)


 357 mg/dL


(70-99) 303 mg/dL


(70-99) 315 mg/dL


(70-99) 345 mg/dL


(70-99)


 


Test


 8/11/20


06:05 8/11/20


08:00 


 





 


Sodium Level


 139 mmol/L


(136-145) 


 


 





 


Potassium Level


 5.6 mmol/L


(3.5-5.1) 


 


 





 


Chloride Level


 107 mmol/L


() 


 


 





 


Carbon Dioxide Level


 27 mmol/L


(21-32) 


 


 





 


Anion Gap 5 (6-14)    


 


Blood Urea Nitrogen


 48 mg/dL


(8-26) 


 


 





 


Creatinine


 1.3 mg/dL


(0.7-1.3) 


 


 





 


Estimated GFR


(Cockcroft-Gault) 73.6 


 


 


 





 


BUN/Creatinine Ratio 37 (6-20)    


 


Glucose Level


 380 mg/dL


(70-99) 


 


 





 


Calcium Level


 8.1 mg/dL


(8.5-10.1) 


 


 





 


Total Bilirubin


 0.4 mg/dL


(0.2-1.0) 


 


 





 


Aspartate Amino Transf


(AST/SGOT) 14 U/L (15-37) 


 


 


 





 


Alanine Aminotransferase


(ALT/SGPT) 20 U/L (16-63) 


 


 


 





 


Alkaline Phosphatase


 89 U/L


() 


 


 





 


Total Protein


 6.4 g/dL


(6.4-8.2) 


 


 





 


Albumin


 1.7 g/dL


(3.4-5.0) 


 


 





 


Albumin/Globulin Ratio 0.4 (1.0-1.7)    


 


O2 Saturation  94 % (92-99)   


 


Arterial Blood pH


 


 7.45


(7.35-7.45) 


 





 


Arterial Blood pCO2 at


Patient Temp 


 34 mmHg


(35-46) 


 





 


Arterial Blood pO2 at Patient


Temp 


 70 mmHg


() 


 





 


Arterial Blood HCO3


 


 23 mmol/L


(21-28) 


 





 


Arterial Blood Base Excess


 


 -1 mmol/L


(-3-3) 


 





 


FiO2  50   











Micro


Micro





Microbiology


8/9/20 Blood Culture - Preliminary, Resulted


         NO GROWTH AFTER 2 DAYS





Review of Systems


Constitutional:  yes: other (UNABLE TO OBTAIN)





Physical Exam


General Appearance:  no apparent distress


Respiratory:  decreased breath sounds


Heart:  S1S2


Abdomen:  soft


Genitourinary:  bladder flat


Extremities:  pulses present





Assessment


Assessment


IMP





MILD HYPERKALEMIA


COVID 19 PNEUMONIA


ACUTE RESP FAILURE


DM II


HTN


CKD 3





PLAN





VENT SUPPORT


RENAL FXN HAS REMAINED STABLE


LABS IN AM


WILL FOLLOW











ADRIAN SALAMANCA MD                 Aug 11, 2020 11:01

## 2020-08-11 NOTE — PDOC
PULMONARY PROGRESS NOTES


DATE: 8/11/20 


TIME: 10:35


Subjective


intubated 8/7, on vent, 


sedated on versed, fentanyl, precedex, agitated at times, small ett secretion, 

on peep 9, fio2 50%, febrile


Vitals





Vital Signs








  Date Time  Temp Pulse Resp B/P (MAP) Pulse Ox O2 Delivery O2 Flow Rate FiO2


 


8/11/20 09:36     97 Ventilator  


 


8/11/20 09:00  76 26 173/103 (126)    


 


8/11/20 08:00 98.4       





 98.4       


 


8/10/20 22:57       40.0 








Comments


ros   unable to obtain  intubated   sedated








on vent, 


sedated


no paradoxical abd motion


no audible wheezing


rrr


no edema


no rash


General:  Mild Distress


Labs





Laboratory Tests








Test


 8/9/20


12:30 8/9/20


17:26 8/9/20


21:16 8/10/20


00:01


 


Glucose (Fingerstick)


 160 mg/dL


(70-99) 220 mg/dL


(70-99) 249 mg/dL


(70-99) 273 mg/dL


(70-99)


 


Test


 8/10/20


05:16 8/10/20


06:00 8/10/20


07:50 8/10/20


17:35


 


Glucose (Fingerstick)


 378 mg/dL


(70-99) 


 


 357 mg/dL


(70-99)


 


White Blood Count


 


 20.8 x10^3/uL


(4.0-11.0) 


 





 


Red Blood Count


 


 4.57 x10^6/uL


(4.30-5.70) 


 





 


Hemoglobin


 


 12.2 g/dL


(13.0-17.5) 


 





 


Hematocrit


 


 37.2 %


(39.0-53.0) 


 





 


Mean Corpuscular Volume  81 fL ()   


 


Mean Corpuscular Hemoglobin  27 pg (25-35)   


 


Mean Corpuscular Hemoglobin


Concent 


 33 g/dL


(31-37) 


 





 


Red Cell Distribution Width


 


 14.7 %


(11.5-14.5) 


 





 


Platelet Count


 


 300 x10^3/uL


(140-400) 


 





 


Neutrophils (%) (Auto)  83 % (31-73)   


 


Lymphocytes (%) (Auto)  4 % (24-48)   


 


Monocytes (%) (Auto)  12 % (0-9)   


 


Eosinophils (%) (Auto)  0 % (0-3)   


 


Basophils (%) (Auto)  1 % (0-3)   


 


Neutrophils # (Auto)


 


 17.2 x10^3/uL


(1.8-7.7) 


 





 


Lymphocytes # (Auto)


 


 0.7 x10^3/uL


(1.0-4.8) 


 





 


Monocytes # (Auto)


 


 2.6 x10^3/uL


(0.0-1.1) 


 





 


Eosinophils # (Auto)


 


 0.0 x10^3/uL


(0.0-0.7) 


 





 


Basophils # (Auto)


 


 0.2 x10^3/uL


(0.0-0.2) 


 





 


Segmented Neutrophils %  79 % (35-66)   


 


Band Neutrophils %  3 % (0-9)   


 


Lymphocytes %  5 % (24-48)   


 


Atypical Lymphocytes %


(Manual) 


 2 % (0-0) 


 


 





 


Monocytes %  6 % (0-10)   


 


Metamyelocytes %  4 % (0-0)   


 


Myelocytes %  1 % (0-0)   


 


Platelet Estimate


 


 Adequate


(ADEQUATE) 


 





 


Large Platelets  Occ   


 


Anisocytosis  Slight   


 


Sodium Level


 


 139 mmol/L


(136-145) 


 





 


Potassium Level


 


 5.1 mmol/L


(3.5-5.1) 


 





 


Chloride Level


 


 104 mmol/L


() 


 





 


Carbon Dioxide Level


 


 28 mmol/L


(21-32) 


 





 


Anion Gap  7 (6-14)   


 


Blood Urea Nitrogen


 


 53 mg/dL


(8-26) 


 





 


Creatinine


 


 1.6 mg/dL


(0.7-1.3) 


 





 


Estimated GFR


(Cockcroft-Gault) 


 57.9 


 


 





 


BUN/Creatinine Ratio  33 (6-20)   


 


Glucose Level


 


 371 mg/dL


(70-99) 


 





 


Calcium Level


 


 7.6 mg/dL


(8.5-10.1) 


 





 


Total Bilirubin


 


 0.6 mg/dL


(0.2-1.0) 


 





 


Aspartate Amino Transf


(AST/SGOT) 


 19 U/L (15-37) 


 


 





 


Alanine Aminotransferase


(ALT/SGPT) 


 20 U/L (16-63) 


 


 





 


Alkaline Phosphatase


 


 101 U/L


() 


 





 


Total Protein


 


 6.5 g/dL


(6.4-8.2) 


 





 


Albumin


 


 1.8 g/dL


(3.4-5.0) 


 





 


Albumin/Globulin Ratio  0.4 (1.0-1.7)   


 


O2 Saturation   92 % (92-99)  


 


Arterial Blood pH


 


 


 7.44


(7.35-7.45) 





 


Arterial Blood pCO2 at


Patient Temp 


 


 36 mmHg


(35-46) 





 


Arterial Blood pO2 at Patient


Temp 


 


 64 mmHg


() 





 


Arterial Blood HCO3


 


 


 24 mmol/L


(21-28) 





 


Arterial Blood Base Excess


 


 


 0 mmol/L


(-3-3) 





 


FiO2   50  


 


Test


 8/10/20


20:56 8/11/20


00:10 8/11/20


06:04 8/11/20


06:05


 


Glucose (Fingerstick)


 303 mg/dL


(70-99) 315 mg/dL


(70-99) 345 mg/dL


(70-99) 





 


Sodium Level


 


 


 


 139 mmol/L


(136-145)


 


Potassium Level


 


 


 


 5.6 mmol/L


(3.5-5.1)


 


Chloride Level


 


 


 


 107 mmol/L


()


 


Carbon Dioxide Level


 


 


 


 27 mmol/L


(21-32)


 


Anion Gap    5 (6-14) 


 


Blood Urea Nitrogen


 


 


 


 48 mg/dL


(8-26)


 


Creatinine


 


 


 


 1.3 mg/dL


(0.7-1.3)


 


Estimated GFR


(Cockcroft-Gault) 


 


 


 73.6 





 


BUN/Creatinine Ratio    37 (6-20) 


 


Glucose Level


 


 


 


 380 mg/dL


(70-99)


 


Calcium Level


 


 


 


 8.1 mg/dL


(8.5-10.1)


 


Total Bilirubin


 


 


 


 0.4 mg/dL


(0.2-1.0)


 


Aspartate Amino Transf


(AST/SGOT) 


 


 


 14 U/L (15-37) 





 


Alanine Aminotransferase


(ALT/SGPT) 


 


 


 20 U/L (16-63) 





 


Alkaline Phosphatase


 


 


 


 89 U/L


()


 


Total Protein


 


 


 


 6.4 g/dL


(6.4-8.2)


 


Albumin


 


 


 


 1.7 g/dL


(3.4-5.0)


 


Albumin/Globulin Ratio    0.4 (1.0-1.7) 


 


Test


 8/11/20


08:00 


 


 





 


O2 Saturation 94 % (92-99)    


 


Arterial Blood pH


 7.45


(7.35-7.45) 


 


 





 


Arterial Blood pCO2 at


Patient Temp 34 mmHg


(35-46) 


 


 





 


Arterial Blood pO2 at Patient


Temp 70 mmHg


() 


 


 





 


Arterial Blood HCO3


 23 mmol/L


(21-28) 


 


 





 


Arterial Blood Base Excess


 -1 mmol/L


(-3-3) 


 


 





 


FiO2 50    








Laboratory Tests








Test


 8/10/20


17:35 8/10/20


20:56 8/11/20


00:10 8/11/20


06:04


 


Glucose (Fingerstick)


 357 mg/dL


(70-99) 303 mg/dL


(70-99) 315 mg/dL


(70-99) 345 mg/dL


(70-99)


 


Test


 8/11/20


06:05 8/11/20


08:00 


 





 


Sodium Level


 139 mmol/L


(136-145) 


 


 





 


Potassium Level


 5.6 mmol/L


(3.5-5.1) 


 


 





 


Chloride Level


 107 mmol/L


() 


 


 





 


Carbon Dioxide Level


 27 mmol/L


(21-32) 


 


 





 


Anion Gap 5 (6-14)    


 


Blood Urea Nitrogen


 48 mg/dL


(8-26) 


 


 





 


Creatinine


 1.3 mg/dL


(0.7-1.3) 


 


 





 


Estimated GFR


(Cockcroft-Gault) 73.6 


 


 


 





 


BUN/Creatinine Ratio 37 (6-20)    


 


Glucose Level


 380 mg/dL


(70-99) 


 


 





 


Calcium Level


 8.1 mg/dL


(8.5-10.1) 


 


 





 


Total Bilirubin


 0.4 mg/dL


(0.2-1.0) 


 


 





 


Aspartate Amino Transf


(AST/SGOT) 14 U/L (15-37) 


 


 


 





 


Alanine Aminotransferase


(ALT/SGPT) 20 U/L (16-63) 


 


 


 





 


Alkaline Phosphatase


 89 U/L


() 


 


 





 


Total Protein


 6.4 g/dL


(6.4-8.2) 


 


 





 


Albumin


 1.7 g/dL


(3.4-5.0) 


 


 





 


Albumin/Globulin Ratio 0.4 (1.0-1.7)    


 


O2 Saturation  94 % (92-99)   


 


Arterial Blood pH


 


 7.45


(7.35-7.45) 


 





 


Arterial Blood pCO2 at


Patient Temp 


 34 mmHg


(35-46) 


 





 


Arterial Blood pO2 at Patient


Temp 


 70 mmHg


() 


 





 


Arterial Blood HCO3


 


 23 mmol/L


(21-28) 


 





 


Arterial Blood Base Excess


 


 -1 mmol/L


(-3-3) 


 





 


FiO2  50   








Medications





Active Scripts








 Medications  Dose


 Route/Sig


 Max Daily Dose Days Date Category


 


 Levemir (Insulin


 Detemir) 100


 Unit/1 Ml Vial  65 Unit


 SQ BID


   8/4/20 Reported


 


 Novolog (Insulin


 Aspart) 100


 Unit/1 Ml Vial  34 Unit


 SQ TID


   8/4/20 Reported


 


 Hydrochlorothiazide


 Tablet


  (Hydrochlorothiazide)


 12.5 Mg Tablet  12.5 Mg


 PO DAILY


   3/15/20 Rx








Comments


cxr 8/7 reviewed   b lat infilt l>r   ett ok





CT chest reviewed 


 


1.  Essentially nondiagnostic study for evaluation of pulmonary embolism 


due to contrast bolus timing. If persistent clinical concern, repeat CT 


angiogram or VQ scan can better assess.


2.  Multifocal ground glass opacities bilaterally predominantly within the


left upper and lower lobes, concerning for infection such as viral 


pneumonia, ARDS or asymmetric pulmonary edema. Recommend follow-up to 


ensure resolution.





Impression


.


1.  Acute hypoxic respiratory failure secondary to COVID-19 pneumonia and ALI.--

 intubated 8/7


2.  Abnormal CT chest consistent with pneumonia with ground glass opacities


bilaterally, poor contrast was received as a result pulmonary embolism could not


be ruled out, but he has low clinical suspicion for pulmonary embolism and as


such no further investigation is needed.


3.  Underlying morbid obesity. ? sherrell


4.  Diabetes.


5.  Hypertension.


6. MIAN


7. Hypertension 


8. fever





Plan


.


cont vent support, setting reviewed, titrate fio2 to keep sat 94%, decrease peep

as tolerated


abx per ID, 


Continue IV steroids 


Monitor renal function 


s/p convalescent plasma, remdesivir, follow clinical course 


elevate hob


HTN per PCP


DM per PCP 


cxr 8/10 reviewed.





DVT/GI PPX 





 Discussed with RN and rt


critically ill 


cct 30 min wo overlap











AZAM REAGAN MD                 Aug 11, 2020 10:36

## 2020-08-11 NOTE — NUR
SS following up with discharge planning. SS reviewed pt chart and discussed with pt RN. Pt 
remains on the vent at this time. COVID19 positive. Pt on IV Meropenem. SS will continue to 
follow for discharge planning.

## 2020-08-11 NOTE — PDOC
[facility] PROGRESS NOTE


Date of Service


DOS:


DATE: 8/11/20 


TIME: 11:41





Chief Complaint


Chief Complaint


Acute hypoxic respiratory failure secondary to COVID-19 pneumonia. s/p 

intubation, 8/7


Bilateral pulmonary infiltrates - Abnormal CT chest consistent with pneumonia 

with ground glass opacities bilaterally, poor contrast, pulmonary embolism could

not be ruled out, but he has low clinical suspicion for pulmonary embolism and 

as such no further investigation is needed.


Underlying morbid obesity.


Diabetes.


Hypertension.


Sepsis - Fever and leukocytosis. Allergy to Zosyn.





CC time 39 minutes





History of Present Illness


History of Present Illness


08/10/2020


Patient is seen and examined in ICU


Patient is intubated, on vent AC/26/600/70% FiO2 and sedated on Precedex, Versed

and Fentanyl


Discussed with RN


Charts reviewed





08/10/2020


Patient is seen and examined in ICU


Patient is intubated, on vent AC/26/600/55% FiO2 and sedated on Precedex, Versed

and Fentanyl


Received plasma and Remdesivir


Patient had some fever


Discussed with RN


Charts reviewed





Mr Dior is a 41-year-old morbidly obese male with a BMI of 51, DM2, HTN, 

diabetic foot ulcers who was brought into the hospital complaining of shortness 

of breath for 5 days prior to admit.  He was tested positive for SARS-CoV-2 

(COVID 19).  He had a cough, which has been nonproductive.  No headaches, no 

nausea, vomiting, no diarrhea, no dysuria, no focal weakness. Required 2L NCO2 

to maintain O2 saturations > 88%. He had CTA chest nondiagnostic for pulmonary 

embolism, but with multifocal ground glass opacities bilaterally predominantly 

in the left upper and lower lobes and concerning for COVID pneumonia. His T-max 

is 100.2.  Pulm and ID consulted, admitted for further care.





8/5: On 4L NCO2, asking to leave the hospital.


8/6: Resting on 15L NCO2, trying to take off O2. Transitioned to 35% vapotherm


8/7: Transferred to ICU for Vapotherm. ABG 63 on 90%.  Glucose in the 300s 

despite high dosing of insulin. No abdominal pain. S/p convalescent FFP


8/8: Afebrile. Seen on vent. He was intubated 8/7, on vent, sedated on versed, 

fentanyl, precedex, small ett secretion, on peep 10, fio2 60%.  Had a right IJ 

placed by anesthesia had bleeding for 15 to 20 minutes after placement.  Labs 

with WBC 15.7, Hb 12, platelets 255, , K5, BUN 58, CR 1.6, glucose 132, 

phosphorus 5.7.





Febrile 101.1 F.  Seen on vent AC mode tidal volume 600 FiO2 40%, PEEP of 9.  

Glucose trending downward.  Started on tube feeds.  Still with good urine 

output.





Plan:


Add Carbapenem per ID. +/- zyvox


Cont ICU, critically ill





Vitals/I&O


Vitals/I&O:





                                   Vital Signs








  Date Time  Temp Pulse Resp B/P (MAP) Pulse Ox O2 Delivery O2 Flow Rate FiO2


 


8/11/20 11:38   30  96 Ventilator  


 


8/11/20 10:00  74  186/103 (130)    


 


8/11/20 08:00 98.4       





 98.4       


 


8/10/20 22:57       40.0 














                                    I & O   


 


 8/10/20 8/10/20 8/11/20





 15:00 23:00 07:00


 


Intake Total 350 ml 2070 ml 1706 ml


 


Output Total 1423 ml 1200 ml 960 ml


 


Balance -1073 ml 870 ml 746 ml











Physical Exam


Physical Exam:


GENERAL: Propped up in bed, orally intubated and sedated. + mitts 


HEENT:  Pupils equal. ETT and OGT in place 


NECK:  Supple


LUNGS:  Clear.


HEART:  S1, S2 regular.


ABDOMEN:  Obese, bowel sounds present, soft


: Hopkins in place 


EXTREMITIES:  No edema or cyanosis. SCDs bilaterally. 


SKIN:  No signs of rash. 


NEUROLOGIC:  Sedated


RIJ (8/7) without signs of complications


General:  Other (intubated and sedated)


Heart:  Regular rate, Normal S1, Normal S2, No murmurs


Abdomen:  Normal bowel sounds, No masses


Extremities:  Normal pulses, No tenderness/swelling


Skin:  No significant lesion





Labs


Labs:





Laboratory Tests








Test


 8/10/20


17:35 8/10/20


20:56 8/11/20


00:10 8/11/20


06:04


 


Glucose (Fingerstick)


 357 mg/dL


(70-99) 303 mg/dL


(70-99) 315 mg/dL


(70-99) 345 mg/dL


(70-99)


 


Test


 8/11/20


06:05 8/11/20


08:00 8/11/20


11:20 





 


Sodium Level


 139 mmol/L


(136-145) 


 


 





 


Potassium Level


 5.6 mmol/L


(3.5-5.1) 


 


 





 


Chloride Level


 107 mmol/L


() 


 


 





 


Carbon Dioxide Level


 27 mmol/L


(21-32) 


 


 





 


Anion Gap 5 (6-14)    


 


Blood Urea Nitrogen


 48 mg/dL


(8-26) 


 


 





 


Creatinine


 1.3 mg/dL


(0.7-1.3) 


 


 





 


Estimated GFR


(Cockcroft-Gault) 73.6 


 


 


 





 


BUN/Creatinine Ratio 37 (6-20)    


 


Glucose Level


 380 mg/dL


(70-99) 


 


 





 


Calcium Level


 8.1 mg/dL


(8.5-10.1) 


 


 





 


Total Bilirubin


 0.4 mg/dL


(0.2-1.0) 


 


 





 


Aspartate Amino Transf


(AST/SGOT) 14 U/L (15-37) 


 


 


 





 


Alanine Aminotransferase


(ALT/SGPT) 20 U/L (16-63) 


 


 


 





 


Alkaline Phosphatase


 89 U/L


() 


 


 





 


Total Protein


 6.4 g/dL


(6.4-8.2) 


 


 





 


Albumin


 1.7 g/dL


(3.4-5.0) 


 


 





 


Albumin/Globulin Ratio 0.4 (1.0-1.7)    


 


O2 Saturation  94 % (92-99)   


 


Arterial Blood pH


 


 7.45


(7.35-7.45) 


 





 


Arterial Blood pCO2 at


Patient Temp 


 34 mmHg


(35-46) 


 





 


Arterial Blood pO2 at Patient


Temp 


 70 mmHg


() 


 





 


Arterial Blood HCO3


 


 23 mmol/L


(21-28) 


 





 


Arterial Blood Base Excess


 


 -1 mmol/L


(-3-3) 


 





 


FiO2  50   


 


Glucose (Fingerstick)


 


 


 398 mg/dL


(70-99) 














Assessment and Plan


Assessmemt and Plan


Problems


Medical Problems:


(1) COVID-19 virus detected


Status: Acute  





Assessment


Acute hypoxic respiratory failure secondary to COVID-19 pneumonia. s/p 

intubation, 8/7


Bilateral pulmonary infiltrates - Abnormal CT chest consistent with pneumonia 

with ground glass opacities bilaterally, poor contrast, pulmonary embolism could

not be ruled out, but he has low clinical suspicion for pulmonary embolism and 

as such no further investigation is needed.


Underlying morbid obesity.


Diabetes.


Hypertension.


Sepsis - Fever and leukocytosis. Allergy to Zosyn.





Plan


ICU monitoring


Vent weaning


Respiratory isolation


Humalog 30 tid for glucose control 


Trend Labs (glucose)


Mitts for patient safety


SCD


IV steroids


IV antibiotics


Full Code


Appreciate subspecialist input


Prognosis guarded


Total time 32








Comment


Review of Relevant


I have reviewed the following items joseph (where applicable) has been applied.


Medications:





Current Medications








 Medications


  (Trade)  Dose


 Ordered  Sig/Toni


 Route


 PRN Reason  Start Time


 Stop Time Status Last Admin


Dose Admin


 


 Insulin Glargine


  (Lantus Syringe)  70 unit  HS


 SQ


   8/10/20 21:00


 8/11/20 07:01 DC 8/10/20 20:47





 


 Insulin Glargine


  (Lantus Syringe)  70 unit  BID


 SQ


   8/11/20 09:00


    8/11/20 08:20





 


 Insulin Human


 Lispro


  (HumaLOG)  10 units  1X  ONCE


 SQ


   8/11/20 11:45


 8/11/20 11:46  8/11/20 11:40














Justicifation of Admission Dx:


Justifications for Admission:


Justification of Admission Dx:  Yes


Respiratory Failure:  Non-Cardiac Pulm Edema











JAIRO BUSCH III DO           Aug 11, 2020 11:42

## 2020-08-11 NOTE — PDOC
Infectious Disease Note


Subjective:


Subjective


 


Pt remains intubated/sedated


 Temp pattern improving


T max 100.8


Fio2 dec ,at 45 %





Vital Signs:


Vital Signs





Vital Signs








  Date Time  Temp Pulse Resp B/P (MAP) Pulse Ox O2 Delivery O2 Flow Rate FiO2


 


8/11/20 11:42  87  198/98    


 


8/11/20 11:38   30  96 Ventilator  


 


8/11/20 08:00 98.4       





 98.4       


 


8/10/20 22:57       40.0 











Physical Exam:


PHYSICAL EXAM


GENERAL: Propped up in bed, orally intubated and sedated. + mitts 


HEENT:  Pupils equal. ETT and OGT in place 


NECK:  Supple


LUNGS:  Clear.


HEART:  S1, S2 regular.


ABDOMEN:  Obese, bowel sounds present, soft


: Hopkins in place 


EXTREMITIES:  No edema or cyanosis. SCDs bilaterally. 


SKIN:  No signs of rash. 


NEUROLOGIC:  Sedated


RIJ (8/7) without signs of complications





Medications:


Inpatient Meds:





Current Medications








 Medications


  (Trade)  Dose


 Ordered  Sig/Toni  Start Time


 Stop Time Status Last Admin


Dose Admin


 


 Acetaminophen


  (Tylenol)  650 mg  PRN Q6HRS  PRN  8/9/20 09:15


    8/10/20 05:49


650 MG


 


 Amino Acids/


 Glycerin/


 Electrolytes  1,000 ml @ 


 80 mls/hr  [id number]  8/8/20 12:45


   Cancel  





 


 Amlodipine


 Besylate


  (Norvasc)  10 mg  DAILY  8/6/20 10:00


 8/6/20 10:35 DC  





 


 Atropine Sulfate


  (ATROPINE 0.5mg


 SYRINGE)  0.5 mg  PRN Q5MIN  PRN  8/7/20 11:30


     





 


 Cefazolin Sodium


  (Ancef)  1 gm  1X  ONCE  8/4/20 13:15


 8/4/20 13:16 UNV  





 


 Dexmedetomidine


 HCl 400 mcg/


 Sodium Chloride  100 ml @ 0


 mls/hr  CONT  PRN  8/7/20 11:30


    8/11/20 08:10


18.4 MLS/HR


 


 Dextrose


  (Dextrose


 50%-Water Syringe)  12.5 gm  PRN Q15MIN  PRN  8/4/20 18:00


    8/8/20 21:00


12.5 GM


 


 Enoxaparin Sodium


  (Lovenox 40mg


 Syringe)  40 mg  Q12HR  8/7/20 10:00


    8/11/20 08:19


40 MG


 


 Fentanyl Citrate  55 ml @ 0


 mls/hr  CONT  PRN  8/7/20 19:00


    8/11/20 11:38


4 MLS/HR


 


 Glyburide


  (Diabeta)  5 mg  BIDWMEALS  8/5/20 17:00


 8/8/20 21:04 DC 8/8/20 17:34


5 MG


 


 Haloperidol


 Lactate


  (Haldol Inj)  5 mg  PRN Q3HRS  PRN  8/7/20 10:45


    8/7/20 10:48


5 MG


 


 Heparin Sodium


  (Porcine)


  (Heparin Sodium)  5,000 unit  Q8HRS  8/6/20 14:00


 8/7/20 09:18 DC 8/7/20 06:15


5,000 UNIT


 


 Hydralazine HCl


  (Apresoline Inj)  10 mg  PRN Q4HRS  PRN  8/7/20 08:00


    8/7/20 13:14


10 MG


 


 Info


  (CONTRAST GIVEN


 -- Rx MONITORING)  1 each  PRN DAILY  PRN  8/4/20 10:15


 8/6/20 10:14 DC  





 


 Insulin Glargine


  (Lantus Syringe)  70 unit  BID  8/11/20 09:00


    8/11/20 08:20


70 UNIT


 


 Insulin Human


 Lispro


  (HumaLOG)  10 units  1X  ONCE  8/11/20 11:45


 8/11/20 11:46 DC 8/11/20 11:40


30 UNITS


 


 Iohexol


  (Omnipaque 350


 Mg/ml)  80 ml  1X  ONCE  8/4/20 10:15


 8/4/20 10:16 DC 8/4/20 10:30


80 ML


 


 Labetalol HCl


  (Normodyne Iv


 Push)  20 mg  PRN Q4HRS  PRN  8/7/20 08:00


    8/11/20 11:42


20 MG


 


 Lidocaine HCl


  (Xylocaine 2%


 Topical 5gm Tube)  5 soheila  STK-MED ONCE  8/11/20 12:00


 8/11/20 12:01 DC  





 


 Lorazepam


  (Ativan Inj)  1 mg  PRN Q4HRS  PRN  8/7/20 10:45


 8/7/20 10:45 DC  





 


 Meropenem 500 mg/


 Sodium Chloride  50 ml @ 


 100 mls/hr  Q6HRS  8/9/20 12:00


    8/11/20 11:55


100 MLS/HR


 


 Methylprednisolone


 Sodium Succinate


  (SOLU-Medrol


 40MG VIAL)  40 mg  BID  8/4/20 22:00


    8/11/20 08:18


40 MG


 


 Metoprolol


 Tartrate


  (Lopressor)  100 mg  BID  8/7/20 09:00


    8/11/20 08:18


100 MG


 


 Midazolam HCl  100 ml @ 1


 mls/hr  CONT  PRN  8/7/20 15:15


    8/11/20 08:09


8 MLS/HR


 


 Multivitamins


  (Thera M Plus)  1 tab  DAILY  8/5/20 09:00


    8/11/20 08:18


1 TAB


 


 Naloxone HCl


  (Narcan)  0.4 mg  PRN Q2MIN  PRN  8/7/20 15:15


     





 


 Nicardipine HCl


 50 mg/Sodium


 Chloride  250 ml @ 


 25 mls/hr  CONT  PRN  8/7/20 04:15


    8/7/20 08:46


75 MLS/HR


 


 Non-Formulary


 Medication 1 ea/


 Sodium Chloride  230 ml @ 


 460 mls/hr  Q24H  8/7/20 09:00


 8/10/20 09:39 DC 8/10/20 09:00


460 MLS/HR


 


 Ondansetron HCl


  (Zofran)  4 mg  PRN Q8HRS  PRN  8/4/20 11:45


 8/5/20 11:44 DC  





 


 Pantoprazole


 Sodium


  (PROTONIX VIAL


 for IV PUSH)  40 mg  DAILYAC  8/9/20 07:30


    8/11/20 07:22


40 MG


 


 Propofol  100 ml @ 


 As Directed  STK-MED ONCE  8/7/20 15:09


 8/7/20 15:10 DC  





 


 Sodium Bicarbonate


  (Sodium Bicarb


 Adult 8.4% Syr)  50 meq  1X  ONCE  8/7/20 18:15


 8/7/20 18:16 DC 8/7/20 18:17


50 MEQ


 


 Sodium Chloride  1,000 ml @ 


 25 mls/hr  Q24H  8/7/20 15:04


    8/8/20 17:35


25 MLS/HR


 


 Sterile Water


  (WATER for RESP)  1,000 ml  CONT  PRN  8/6/20 11:45


    8/7/20 08:22


1,000 ML


 


 Succinylcholine


 Chloride


  (Anectine)  200 mg  STK-MED ONCE  8/7/20 15:17


 8/7/20 15:18 DC  





 


 Thrombin  20,000 unit  1X  ONCE  8/7/20 17:00


 8/7/20 17:01 DC  





 


 Vecuronium Bromide


  (Norcuron Bolus)  6 mg  PRN Q6HRS  PRN  8/7/20 15:15


    8/11/20 01:47


6 MG











Labs:


Lab





Laboratory Tests








Test


 8/10/20


17:35 8/10/20


20:56 8/11/20


00:10 8/11/20


06:04


 


Glucose (Fingerstick)


 357 mg/dL


(70-99) 303 mg/dL


(70-99) 315 mg/dL


(70-99) 345 mg/dL


(70-99)


 


Test


 8/11/20


06:05 8/11/20


08:00 8/11/20


11:20 





 


Sodium Level


 139 mmol/L


(136-145) 


 


 





 


Potassium Level


 5.6 mmol/L


(3.5-5.1) 


 


 





 


Chloride Level


 107 mmol/L


() 


 


 





 


Carbon Dioxide Level


 27 mmol/L


(21-32) 


 


 





 


Anion Gap 5 (6-14)    


 


Blood Urea Nitrogen


 48 mg/dL


(8-26) 


 


 





 


Creatinine


 1.3 mg/dL


(0.7-1.3) 


 


 





 


Estimated GFR


(Cockcroft-Gault) 73.6 


 


 


 





 


BUN/Creatinine Ratio 37 (6-20)    


 


Glucose Level


 380 mg/dL


(70-99) 


 


 





 


Calcium Level


 8.1 mg/dL


(8.5-10.1) 


 


 





 


Total Bilirubin


 0.4 mg/dL


(0.2-1.0) 


 


 





 


Aspartate Amino Transf


(AST/SGOT) 14 U/L (15-37) 


 


 


 





 


Alanine Aminotransferase


(ALT/SGPT) 20 U/L (16-63) 


 


 


 





 


Alkaline Phosphatase


 89 U/L


() 


 


 





 


Total Protein


 6.4 g/dL


(6.4-8.2) 


 


 





 


Albumin


 1.7 g/dL


(3.4-5.0) 


 


 





 


Albumin/Globulin Ratio 0.4 (1.0-1.7)    


 


O2 Saturation  94 % (92-99)   


 


Arterial Blood pH


 


 7.45


(7.35-7.45) 


 





 


Arterial Blood pCO2 at


Patient Temp 


 34 mmHg


(35-46) 


 





 


Arterial Blood pO2 at Patient


Temp 


 70 mmHg


() 


 





 


Arterial Blood HCO3


 


 23 mmol/L


(21-28) 


 





 


Arterial Blood Base Excess


 


 -1 mmol/L


(-3-3) 


 





 


FiO2  50   


 


Glucose (Fingerstick)


 


 


 398 mg/dL


(70-99) 














Objective:


Assessment:


Fever


COVID-19 positive, 7/29. s/p convalescent plasma 


Bilateral pulmonary infiltrates.


Leukocytosis, on steroids, increased 


Allergy to Zosyn. h/o hives and swelling. 


Acute respiratory failure s/p intubation, 8/7


Renal insufficiency  


Diabetes.


Hypertension.


Obesity.





Plan:


Plan of Care


Cont Merrem 8/9


Remdesivir and steroids


s/p convalescent plasma 


Maintain aspiration precautions


Airborne isolation for COVID-19


Discussed with nursing





Critically ill











SATNAM SALINAS MD           Aug 11, 2020 12:13

## 2020-08-12 NOTE — PDOC
[facility] PROGRESS NOTE


Date of Service


DOS:


DATE: 8/12/20 


TIME: 08:31





Chief Complaint


Chief Complaint


Acute hypoxic respiratory failure secondary to COVID-19 pneumonia. s/p 

intubation, 8/7


Bilateral pulmonary infiltrates - Abnormal CT chest consistent with pneumonia 

with ground glass opacities bilaterally, poor contrast, pulmonary embolism could

not be ruled out, but he has low clinical suspicion for pulmonary embolism and 

as such no further investigation is needed.


Underlying morbid obesity.


Diabetes.


Hypertension.


Sepsis - Fever and leukocytosis. Allergy to Zosyn.





CC time 39 minutes





History of Present Illness


History of Present Illness


8/12/2020


Patient is seen and examined in ICU


Patient is intubated, on vent AC/26/600/45% FiO2 with 8 PEEP and sedated on 

Precedex, Versed and Fentanyl


OG Tube at 70cc/hr


Discussed with RN


Charts reviewed








08/11/2020


Patient is seen and examined in ICU


Patient is intubated, on vent AC/26/600/70% FiO2 and sedated on Precedex, Versed

and Fentanyl


Discussed with RN


Charts reviewed





08/10/2020


Patient is seen and examined in ICU


Patient is intubated, on vent AC/26/600/55% FiO2 and sedated on Precedex, Versed

and Fentanyl


Received plasma and Remdesivir


Patient had some fever


Discussed with RN


Charts reviewed





Mr Dior is a 41-year-old morbidly obese male with a BMI of 51, DM2, HTN, 

diabetic foot ulcers who was brought into the hospital complaining of shortness 

of breath for 5 days prior to admit.  He was tested positive for SARS-CoV-2 

(COVID 19).  He had a cough, which has been nonproductive.  No headaches, no 

nausea, vomiting, no diarrhea, no dysuria, no focal weakness. Required 2L NCO2 

to maintain O2 saturations > 88%. He had CTA chest nondiagnostic for pulmonary 

embolism, but with multifocal ground glass opacities bilaterally predominantly 

in the left upper and lower lobes and concerning for COVID pneumonia. His T-max 

is 100.2.  Pulm and ID consulted, admitted for further care.





8/5: On 4L NCO2, asking to leave the hospital.


8/6: Resting on 15L NCO2, trying to take off O2. Transitioned to 35% vapotherm


8/7: Transferred to ICU for Vapotherm. ABG 63 on 90%.  Glucose in the 300s 

despite high dosing of insulin. No abdominal pain. S/p convalescent FFP


8/8: Afebrile. Seen on vent. He was intubated 8/7, on vent, sedated on versed, 

fentanyl, precedex, small ett secretion, on peep 10, fio2 60%.  Had a right IJ 

placed by anesthesia had bleeding for 15 to 20 minutes after placement.  Labs 

with WBC 15.7, Hb 12, platelets 255, , K5, BUN 58, CR 1.6, glucose 132, 

phosphorus 5.7.





Febrile 101.1 F.  Seen on vent AC mode tidal volume 600 FiO2 40%, PEEP of 9.  

Glucose trending downward.  Started on tube feeds.  Still with good urine 

output.





Plan:


Add Carbapenem per ID. +/- zyvox


Cont ICU, critically ill





Vitals/I&O


Vitals/I&O:





                                   Vital Signs








  Date Time  Temp Pulse Resp B/P (MAP) Pulse Ox O2 Delivery O2 Flow Rate FiO2


 


8/12/20 08:16  73  140/66    


 


8/12/20 07:00   26  95 Ventilator  


 


8/12/20 04:00 98.0       





 98.0       


 


8/12/20 04:00       40.0 














                                    I & O   


 


 8/11/20 8/11/20 8/12/20





 15:00 23:00 07:00


 


Intake Total 450 ml 2727 ml 3626.5 ml


 


Output Total 2100 ml 635 ml 815 ml


 


Balance -1650 ml 2092 ml 2811.5 ml











Physical Exam


Physical Exam:


GENERAL: Propped up in bed, orally intubated and sedated. + mitts 


HEENT:  Pupils equal. ETT and OGT in place 


NECK:  Supple


LUNGS:  Clear.


HEART:  S1, S2 regular.


ABDOMEN:  Obese, bowel sounds present, soft


: Hopkins in place 


EXTREMITIES:  No edema or cyanosis. SCDs bilaterally. 


SKIN:  No signs of rash. 


NEUROLOGIC:  Sedated


RIJ (8/7) without signs of complications


General:  Other (intubated and sedated)


Heart:  Regular rate, Normal S1, Normal S2, No murmurs


Abdomen:  Normal bowel sounds, No masses


Extremities:  Normal pulses, No tenderness/swelling


Skin:  No significant lesion





Labs


Labs:





Laboratory Tests








Test


 8/11/20


11:20 8/11/20


13:47 8/11/20


17:35 8/11/20


23:42


 


Glucose (Fingerstick)


 398 mg/dL


(70-99) 389 mg/dL


(70-99) 388 mg/dL


(70-99) 320 mg/dL


(70-99)


 


Test


 8/12/20


05:47 8/12/20


08:23 


 





 


Glucose (Fingerstick)


 375 mg/dL


(70-99) 345 mg/dL


(70-99) 


 














Assessment and Plan


Assessmemt and Plan


Problems


Medical Problems:


(1) COVID-19 virus detected


Status: Acute  





Assessment


Acute hypoxic respiratory failure secondary to COVID-19 pneumonia. s/p 

intubation, 8/7


Bilateral pulmonary infiltrates - Abnormal CT chest consistent with pneumonia 

with ground glass opacities bilaterally, poor contrast, pulmonary embolism could

not be ruled out, but he has low clinical suspicion for pulmonary embolism and 

as such no further investigation is needed.


Underlying morbid obesity.


Diabetes.


Hypertension.


Sepsis - Fever and leukocytosis. Allergy to Zosyn.





Plan


ICU monitoring


Vent weaning


Respiratory isolation


IV insulin drip


Trend Labs (glucose)


Mitts for patient safety


SCD


IV steroids


IV antibiotics


Full Code


He is critically ill


Appreciate subspecialist input


Total time 31-minute











Comment


Review of Relevant


I have reviewed the following items joseph (where applicable) has been applied.


Medications:





Current Medications








 Medications


  (Trade)  Dose


 Ordered  Sig/Toni


 Route


 PRN Reason  Start Time


 Stop Time Status Last Admin


Dose Admin


 


 Insulin Glargine


  (Lantus Syringe)  70 unit  BID


 SQ


   8/11/20 09:00


    8/11/20 21:09





 


 Insulin Human


 Lispro


  (HumaLOG)  30 units  Q8HRS


 SQ


   8/11/20 14:00


 8/12/20 07:49 DC 8/12/20 05:55





 


 Insulin Human


 Lispro


  (HumaLOG)  10 units  1X  ONCE


 SQ


   8/11/20 11:45


 8/11/20 11:46 DC 8/11/20 11:40





 


 Glyburide


  (Diabeta)  5 mg  BID


 PO


   8/11/20 21:00


    8/12/20 08:16





 


 Insulin Human


 Lispro


  (HumaLOG)  50 units  1X  ONCE


 SQ


   8/11/20 18:00


 8/11/20 18:01 DC 8/11/20 17:51














Justicifation of Admission Dx:


Justifications for Admission:


Justification of Admission Dx:  Yes


Respiratory Failure:  Non-Cardiac Pulm Edema











JAIRO BUSCH III DO           Aug 12, 2020 08:33

## 2020-08-12 NOTE — PDOC
Renal-Progress Notes


Subjective Notes


Notes


NO CHANGES





History of Present Illness


Hx of present illness


NO CHANGES





Vitals


Vitals





Vital Signs








  Date Time  Temp Pulse Resp B/P (MAP) Pulse Ox O2 Delivery O2 Flow Rate FiO2


 


8/12/20 09:00  70 26 135/66 (89) 94 Ventilator  


 


8/12/20 08:00 98.2       





 98.2       


 


8/12/20 04:00       40.0 








Weight


Weight [ ]





I.O.


Intake and Output











Intake and Output 


 


 8/12/20





 07:00


 


Intake Total 6803.5 ml


 


Output Total 3550 ml


 


Balance 3253.5 ml


 


 


 


IV Total 2928.5 ml


 


Tube Feeding 2875 ml


 


Other 1000 ml


 


Output Urine Total 3550 ml











Labs


Labs





Laboratory Tests








Test


 8/11/20


11:20 8/11/20


13:47 8/11/20


17:35 8/11/20


23:42


 


Glucose (Fingerstick)


 398 mg/dL


(70-99) 389 mg/dL


(70-99) 388 mg/dL


(70-99) 320 mg/dL


(70-99)


 


Test


 8/12/20


05:47 8/12/20


06:00 8/12/20


08:00 8/12/20


08:23


 


Glucose (Fingerstick)


 375 mg/dL


(70-99) 


 


 345 mg/dL


(70-99)


 


Sodium Level


 


 141 mmol/L


(136-145) 


 





 


Potassium Level


 


 5.1 mmol/L


(3.5-5.1) 


 





 


Chloride Level


 


 108 mmol/L


() 


 





 


Carbon Dioxide Level


 


 26 mmol/L


(21-32) 


 





 


Anion Gap  7 (6-14)   


 


Blood Urea Nitrogen


 


 62 mg/dL


(8-26) 


 





 


Creatinine


 


 1.5 mg/dL


(0.7-1.3) 


 





 


Estimated GFR


(Cockcroft-Gault) 


 62.4 


 


 





 


BUN/Creatinine Ratio  41 (6-20)   


 


Glucose Level


 


 389 mg/dL


(70-99) 


 





 


Calcium Level


 


 7.6 mg/dL


(8.5-10.1) 


 





 


Total Bilirubin


 


 0.4 mg/dL


(0.2-1.0) 


 





 


Aspartate Amino Transf


(AST/SGOT) 


 8 U/L (15-37) 


 


 





 


Alanine Aminotransferase


(ALT/SGPT) 


 15 U/L (16-63) 


 


 





 


Alkaline Phosphatase


 


 82 U/L


() 


 





 


Total Protein


 


 6.3 g/dL


(6.4-8.2) 


 





 


Albumin


 


 1.6 g/dL


(3.4-5.0) 


 





 


Albumin/Globulin Ratio  0.3 (1.0-1.7)   


 


O2 Saturation   91 % (92-99)  


 


Arterial Blood pH


 


 


 7.43


(7.35-7.45) 





 


Arterial Blood pCO2 at


Patient Temp 


 


 34 mmHg


(35-46) 





 


Arterial Blood pO2 at Patient


Temp 


 


 63 mmHg


() 





 


Arterial Blood HCO3


 


 


 22 mmol/L


(21-28) 





 


Arterial Blood Base Excess


 


 


 -2 mmol/L


(-3-3) 





 


FiO2   45  


 


Test


 8/12/20


09:18 


 


 





 


Glucose (Fingerstick)


 346 mg/dL


(70-99) 


 


 














Micro


Micro





Microbiology


8/9/20 Blood Culture - Preliminary, Resulted


         NO GROWTH AFTER 2 DAYS





Review of Systems


Constitutional:  yes: other (UNABLE TO OBTAIN)





Physical Exam


General Appearance:  no apparent distress


Respiratory:  decreased breath sounds


Heart:  S1S2


Abdomen:  soft


Genitourinary:  bladder flat


Extremities:  pulses present





Assessment


Assessment


IMP





MILD HYPERKALEMIA-RESOLVED


COVID 19 PNEUMONIA


ACUTE RESP FAILURE


DM II


HTN


CKD 3-CR STABLE AT 1.5


LEUCOCYTOSIS





PLAN





VENT SUPPORT


RENAL FXN HAS REMAINED STABLE


LABS IN AM


WILL FOLLOW











ADRIAN SALAMANCA MD                 Aug 12, 2020 10:26

## 2020-08-12 NOTE — PDOC
Infectious Disease Note


Subjective:


Subjective


 


Pt remains intubated/sedated


Afebrile last 24 hours


Remains on insulin drip and Cardene drip





Vital Signs:


Vital Signs





Vital Signs








  Date Time  Temp Pulse Resp B/P (MAP) Pulse Ox O2 Delivery O2 Flow Rate FiO2


 


8/12/20 08:16  73  140/66    


 


8/12/20 07:00   26  95 Ventilator  


 


8/12/20 04:00 98.0       





 98.0       


 


8/12/20 04:00       40.0 











Physical Exam:


PHYSICAL EXAM


GENERAL: Propped up in bed, orally intubated and sedated. + mitts 


HEENT:  Pupils equal. ETT and OGT in place 


NECK:  Supple


LUNGS:  Clear.


HEART:  S1, S2 regular.


ABDOMEN:  Obese, bowel sounds present, soft


: Hopkins in place 


EXTREMITIES:  No edema or cyanosis. SCDs bilaterally. 


SKIN:  No signs of rash. 


NEUROLOGIC:  Sedated


RIJ (8/7) without signs of complications





Medications:


Inpatient Meds:





Current Medications








 Medications


  (Trade)  Dose


 Ordered  Sig/Toni  Start Time


 Stop Time Status Last Admin


Dose Admin


 


 Acetaminophen


  (Tylenol)  650 mg  PRN Q6HRS  PRN  8/9/20 09:15


    8/10/20 05:49


650 MG


 


 Amino Acids/


 Glycerin/


 Electrolytes  1,000 ml @ 


 80 mls/hr  [id number]  8/8/20 12:45


   Cancel  





 


 Amlodipine


 Besylate


  (Norvasc)  10 mg  DAILY  8/6/20 10:00


 8/6/20 10:35 DC  





 


 Atropine Sulfate


  (ATROPINE 0.5mg


 SYRINGE)  0.5 mg  PRN Q5MIN  PRN  8/7/20 11:30


     





 


 Cefazolin Sodium


  (Ancef)  1 gm  1X  ONCE  8/4/20 13:15


 8/4/20 13:16 UNV  





 


 Dexmedetomidine


 HCl 400 mcg/


 Sodium Chloride  100 ml @ 0


 mls/hr  CONT  PRN  8/7/20 11:30


    8/12/20 06:37


27.6 MLS/HR


 


 Dextrose


  (Dextrose


 50%-Water Syringe)  12.5 gm  PRN Q15MIN  PRN  8/4/20 18:00


    8/8/20 21:00


12.5 GM


 


 Enoxaparin Sodium


  (Lovenox 40mg


 Syringe)  40 mg  Q12HR  8/7/20 10:00


    8/12/20 08:17


40 MG


 


 Fentanyl Citrate  55 ml @ 0


 mls/hr  CONT  PRN  8/7/20 19:00


    8/11/20 22:15


4 MLS/HR


 


 Glyburide


  (Diabeta)  5 mg  BID  8/11/20 21:00


    8/12/20 08:16


5 MG


 


 Haloperidol


 Lactate


  (Haldol Inj)  5 mg  PRN Q3HRS  PRN  8/7/20 10:45


    8/7/20 10:48


5 MG


 


 Heparin Sodium


  (Porcine)


  (Heparin Sodium)  5,000 unit  Q8HRS  8/6/20 14:00


 8/7/20 09:18 DC 8/7/20 06:15


5,000 UNIT


 


 Hydralazine HCl


  (Apresoline Inj)  10 mg  PRN Q4HRS  PRN  8/7/20 08:00


    8/7/20 13:14


10 MG


 


 Info


  (CONTRAST GIVEN


 -- Rx MONITORING)  1 each  PRN DAILY  PRN  8/4/20 10:15


 8/6/20 10:14 DC  





 


 Insulin Glargine


  (Lantus Syringe)  70 unit  BID  8/11/20 09:00


    8/11/20 21:09


70 UNIT


 


 Insulin Human


 Lispro


  (HumaLOG)  50 units  1X  ONCE  8/11/20 18:00


 8/11/20 18:01 DC 8/11/20 17:51


50 UNITS


 


 Insulin Human


 Regular 100 unit/


 Sodium Chloride  101 ml @ 0


 mls/hr  CONT  PRN  8/12/20 08:00


     





 


 Iohexol


  (Omnipaque 350


 Mg/ml)  80 ml  1X  ONCE  8/4/20 10:15


 8/4/20 10:16 DC 8/4/20 10:30


80 ML


 


 Labetalol HCl


  (Normodyne Iv


 Push)  20 mg  PRN Q4HRS  PRN  8/7/20 08:00


    8/11/20 11:42


20 MG


 


 Lidocaine HCl


  (Xylocaine 2%


 Topical 5gm Tube)  5 soheila  STK-MED ONCE  8/11/20 12:00


 8/11/20 12:01 DC  





 


 Lorazepam


  (Ativan Inj)  1 mg  PRN Q4HRS  PRN  8/7/20 10:45


 8/7/20 10:45 DC  





 


 Meropenem 500 mg/


 Sodium Chloride  50 ml @ 


 100 mls/hr  Q6HRS  8/9/20 12:00


    8/12/20 05:54


100 MLS/HR


 


 Methylprednisolone


 Sodium Succinate


  (SOLU-Medrol


 40MG VIAL)  40 mg  BID  8/4/20 22:00


    8/12/20 08:16


40 MG


 


 Metoprolol


 Tartrate


  (Lopressor)  100 mg  BID  8/7/20 09:00


    8/12/20 08:16


100 MG


 


 Midazolam HCl  100 ml @ 1


 mls/hr  CONT  PRN  8/7/20 15:15


    8/12/20 02:54


8 MLS/HR


 


 Multivitamins


  (Thera M Plus)  1 tab  DAILY  8/5/20 09:00


    8/12/20 08:16


1 TAB


 


 Naloxone HCl


  (Narcan)  0.4 mg  PRN Q2MIN  PRN  8/7/20 15:15


     





 


 Nicardipine HCl


 50 mg/Sodium


 Chloride  250 ml @ 


 25 mls/hr  CONT  PRN  8/7/20 04:15


    8/12/20 07:10


75 MLS/HR


 


 Non-Formulary


 Medication 1 ea/


 Sodium Chloride  230 ml @ 


 460 mls/hr  Q24H  8/7/20 09:00


 8/10/20 09:39 DC 8/10/20 09:00


460 MLS/HR


 


 Ondansetron HCl


  (Zofran)  4 mg  PRN Q8HRS  PRN  8/4/20 11:45


 8/5/20 11:44 DC  





 


 Pantoprazole


 Sodium


  (PROTONIX VIAL


 for IV PUSH)  40 mg  DAILYAC  8/9/20 07:30


    8/12/20 07:14


40 MG


 


 Propofol  100 ml @ 


 As Directed  STK-MED ONCE  8/7/20 15:09


 8/7/20 15:10 DC  





 


 Sodium Bicarbonate


  (Sodium Bicarb


 Adult 8.4% Syr)  50 meq  1X  ONCE  8/7/20 18:15


 8/7/20 18:16 DC 8/7/20 18:17


50 MEQ


 


 Sodium Chloride  1,000 ml @ 


 25 mls/hr  Q24H  8/7/20 15:04


    8/11/20 19:55


25 MLS/HR


 


 Sterile Water


  (WATER for RESP)  1,000 ml  CONT  PRN  8/6/20 11:45


    8/7/20 08:22


1,000 ML


 


 Succinylcholine


 Chloride


  (Anectine)  200 mg  STK-MED ONCE  8/7/20 15:17


 8/7/20 15:18 DC  





 


 Thrombin  20,000 unit  1X  ONCE  8/7/20 17:00


 8/7/20 17:01 DC  





 


 Vecuronium Bromide


  (Norcuron Bolus)  6 mg  PRN Q6HRS  PRN  8/7/20 15:15


    8/11/20 16:17


6 MG











Labs:


Lab





Laboratory Tests








Test


 8/11/20


11:20 8/11/20


13:47 8/11/20


17:35 8/11/20


23:42


 


Glucose (Fingerstick)


 398 mg/dL


(70-99) 389 mg/dL


(70-99) 388 mg/dL


(70-99) 320 mg/dL


(70-99)


 


Test


 8/12/20


05:47 8/12/20


08:23 


 





 


Glucose (Fingerstick)


 375 mg/dL


(70-99) 345 mg/dL


(70-99) 


 














Objective:


Assessment:


Fever


COVID-19 positive, 7/29. s/p convalescent plasma 


Bilateral pulmonary infiltrates.


Leukocytosis, on steroids, increased 


Allergy to Zosyn. h/o hives and swelling. 


Acute respiratory failure s/p intubation, 8/7


Renal insufficiency  


Diabetes.  Poorly controlled


Hypertension.


Obesity.





Plan:


Plan of Care


Cont Merrem 8/9


Remdesivir and steroids


s/p convalescent plasma 


Maintain aspiration precautions


Airborne isolation for COVID-19


Discussed with nursing





Critically ill











SATNAM SALINAS MD           Aug 12, 2020 08:30

## 2020-08-12 NOTE — PDOC
PULMONARY PROGRESS NOTES


DATE: 8/12/20 


TIME: 10:33


Subjective


intubated 8/7, on vent, 


sedated on versed, fentanyl, precedex, a-febrile, 


no overnight concerns from nursing


Vitals





Vital Signs








  Date Time  Temp Pulse Resp B/P (MAP) Pulse Ox O2 Delivery O2 Flow Rate FiO2


 


8/12/20 09:00  70 26 135/66 (89) 94 Ventilator  


 


8/12/20 08:00 98.2       





 98.2       


 


8/12/20 04:00       40.0 








Comments


ros   unable to obtain  intubated   sedated


on vent, 


sedated


no paradoxical abd motion


no audible wheezing


rrr


no edema


no rash


General:  Mild Distress


Labs





Laboratory Tests








Test


 8/10/20


17:35 8/10/20


20:56 8/11/20


00:10 8/11/20


06:04


 


Glucose (Fingerstick)


 357 mg/dL


(70-99) 303 mg/dL


(70-99) 315 mg/dL


(70-99) 345 mg/dL


(70-99)


 


Test


 8/11/20


06:05 8/11/20


08:00 8/11/20


11:20 8/11/20


13:47


 


Sodium Level


 139 mmol/L


(136-145) 


 


 





 


Potassium Level


 5.6 mmol/L


(3.5-5.1) 


 


 





 


Chloride Level


 107 mmol/L


() 


 


 





 


Carbon Dioxide Level


 27 mmol/L


(21-32) 


 


 





 


Anion Gap 5 (6-14)    


 


Blood Urea Nitrogen


 48 mg/dL


(8-26) 


 


 





 


Creatinine


 1.3 mg/dL


(0.7-1.3) 


 


 





 


Estimated GFR


(Cockcroft-Gault) 73.6 


 


 


 





 


BUN/Creatinine Ratio 37 (6-20)    


 


Glucose Level


 380 mg/dL


(70-99) 


 


 





 


Calcium Level


 8.1 mg/dL


(8.5-10.1) 


 


 





 


Total Bilirubin


 0.4 mg/dL


(0.2-1.0) 


 


 





 


Aspartate Amino Transf


(AST/SGOT) 14 U/L (15-37) 


 


 


 





 


Alanine Aminotransferase


(ALT/SGPT) 20 U/L (16-63) 


 


 


 





 


Alkaline Phosphatase


 89 U/L


() 


 


 





 


Total Protein


 6.4 g/dL


(6.4-8.2) 


 


 





 


Albumin


 1.7 g/dL


(3.4-5.0) 


 


 





 


Albumin/Globulin Ratio 0.4 (1.0-1.7)    


 


O2 Saturation  94 % (92-99)   


 


Arterial Blood pH


 


 7.45


(7.35-7.45) 


 





 


Arterial Blood pCO2 at


Patient Temp 


 34 mmHg


(35-46) 


 





 


Arterial Blood pO2 at Patient


Temp 


 70 mmHg


() 


 





 


Arterial Blood HCO3


 


 23 mmol/L


(21-28) 


 





 


Arterial Blood Base Excess


 


 -1 mmol/L


(-3-3) 


 





 


FiO2  50   


 


Glucose (Fingerstick)


 


 


 398 mg/dL


(70-99) 389 mg/dL


(70-99)


 


Test


 8/11/20


17:35 8/11/20


23:42 8/12/20


05:47 8/12/20


06:00


 


Glucose (Fingerstick)


 388 mg/dL


(70-99) 320 mg/dL


(70-99) 375 mg/dL


(70-99) 





 


Sodium Level


 


 


 


 141 mmol/L


(136-145)


 


Potassium Level


 


 


 


 5.1 mmol/L


(3.5-5.1)


 


Chloride Level


 


 


 


 108 mmol/L


()


 


Carbon Dioxide Level


 


 


 


 26 mmol/L


(21-32)


 


Anion Gap    7 (6-14) 


 


Blood Urea Nitrogen


 


 


 


 62 mg/dL


(8-26)


 


Creatinine


 


 


 


 1.5 mg/dL


(0.7-1.3)


 


Estimated GFR


(Cockcroft-Gault) 


 


 


 62.4 





 


BUN/Creatinine Ratio    41 (6-20) 


 


Glucose Level


 


 


 


 389 mg/dL


(70-99)


 


Calcium Level


 


 


 


 7.6 mg/dL


(8.5-10.1)


 


Total Bilirubin


 


 


 


 0.4 mg/dL


(0.2-1.0)


 


Aspartate Amino Transf


(AST/SGOT) 


 


 


 8 U/L (15-37) 





 


Alanine Aminotransferase


(ALT/SGPT) 


 


 


 15 U/L (16-63) 





 


Alkaline Phosphatase


 


 


 


 82 U/L


()


 


Total Protein


 


 


 


 6.3 g/dL


(6.4-8.2)


 


Albumin


 


 


 


 1.6 g/dL


(3.4-5.0)


 


Albumin/Globulin Ratio    0.3 (1.0-1.7) 


 


Test


 8/12/20


08:00 8/12/20


08:23 8/12/20


09:18 





 


O2 Saturation 91 % (92-99)    


 


Arterial Blood pH


 7.43


(7.35-7.45) 


 


 





 


Arterial Blood pCO2 at


Patient Temp 34 mmHg


(35-46) 


 


 





 


Arterial Blood pO2 at Patient


Temp 63 mmHg


() 


 


 





 


Arterial Blood HCO3


 22 mmol/L


(21-28) 


 


 





 


Arterial Blood Base Excess


 -2 mmol/L


(-3-3) 


 


 





 


FiO2 45    


 


Glucose (Fingerstick)


 


 345 mg/dL


(70-99) 346 mg/dL


(70-99) 











Laboratory Tests








Test


 8/11/20


11:20 8/11/20


13:47 8/11/20


17:35 8/11/20


23:42


 


Glucose (Fingerstick)


 398 mg/dL


(70-99) 389 mg/dL


(70-99) 388 mg/dL


(70-99) 320 mg/dL


(70-99)


 


Test


 8/12/20


05:47 8/12/20


06:00 8/12/20


08:00 8/12/20


08:23


 


Glucose (Fingerstick)


 375 mg/dL


(70-99) 


 


 345 mg/dL


(70-99)


 


Sodium Level


 


 141 mmol/L


(136-145) 


 





 


Potassium Level


 


 5.1 mmol/L


(3.5-5.1) 


 





 


Chloride Level


 


 108 mmol/L


() 


 





 


Carbon Dioxide Level


 


 26 mmol/L


(21-32) 


 





 


Anion Gap  7 (6-14)   


 


Blood Urea Nitrogen


 


 62 mg/dL


(8-26) 


 





 


Creatinine


 


 1.5 mg/dL


(0.7-1.3) 


 





 


Estimated GFR


(Cockcroft-Gault) 


 62.4 


 


 





 


BUN/Creatinine Ratio  41 (6-20)   


 


Glucose Level


 


 389 mg/dL


(70-99) 


 





 


Calcium Level


 


 7.6 mg/dL


(8.5-10.1) 


 





 


Total Bilirubin


 


 0.4 mg/dL


(0.2-1.0) 


 





 


Aspartate Amino Transf


(AST/SGOT) 


 8 U/L (15-37) 


 


 





 


Alanine Aminotransferase


(ALT/SGPT) 


 15 U/L (16-63) 


 


 





 


Alkaline Phosphatase


 


 82 U/L


() 


 





 


Total Protein


 


 6.3 g/dL


(6.4-8.2) 


 





 


Albumin


 


 1.6 g/dL


(3.4-5.0) 


 





 


Albumin/Globulin Ratio  0.3 (1.0-1.7)   


 


O2 Saturation   91 % (92-99)  


 


Arterial Blood pH


 


 


 7.43


(7.35-7.45) 





 


Arterial Blood pCO2 at


Patient Temp 


 


 34 mmHg


(35-46) 





 


Arterial Blood pO2 at Patient


Temp 


 


 63 mmHg


() 





 


Arterial Blood HCO3


 


 


 22 mmol/L


(21-28) 





 


Arterial Blood Base Excess


 


 


 -2 mmol/L


(-3-3) 





 


FiO2   45  


 


Test


 8/12/20


09:18 


 


 





 


Glucose (Fingerstick)


 346 mg/dL


(70-99) 


 


 











Medications





Active Scripts








 Medications  Dose


 Route/Sig


 Max Daily Dose Days Date Category


 


 Levemir (Insulin


 Detemir) 100


 Unit/1 Ml Vial  65 Unit


 SQ BID


   8/4/20 Reported


 


 Novolog (Insulin


 Aspart) 100


 Unit/1 Ml Vial  34 Unit


 SQ TID


   8/4/20 Reported


 


 Hydrochlorothiazide


 Tablet


  (Hydrochlorothiazide)


 12.5 Mg Tablet  12.5 Mg


 PO DAILY


   3/15/20 Rx








Comments


cxr 8/7 reviewed   b lat infilt l>r   ett ok





CT chest reviewed 


 


1.  Essentially nondiagnostic study for evaluation of pulmonary embolism 


due to contrast bolus timing. If persistent clinical concern, repeat CT 


angiogram or VQ scan can better assess.


2.  Multifocal ground glass opacities bilaterally predominantly within the


left upper and lower lobes, concerning for infection such as viral 


pneumonia, ARDS or asymmetric pulmonary edema. Recommend follow-up to 


ensure resolution.





Impression


.


1.  Acute hypoxic respiratory failure secondary to COVID-19 pneumonia and ALI.--

 intubated 8/7


2.  Abnormal CT chest consistent with pneumonia with ground glass opacities


bilaterally, poor contrast was received as a result pulmonary embolism could not


be ruled out, but he has low clinical suspicion for pulmonary embolism and as


such no further investigation is needed.


3.  Underlying morbid obesity. ? sherrell


4.  Diabetes.


5.  Hypertension.


6. MIAN


7. Hypertension 


8. fever-resolved





Plan


.


cont vent support, setting reviewed, titrate fio2 to keep sat 94%, ABG reviewed,

Fi02 45% and PEEP 8


abx per ID, 


Continue IV steroids 


Monitor renal function 


s/p convalescent plasma, remdesivir, follow clinical course 


elevate hob


HTN per PCP-- on cardene gtt 


DM per PCP -- on insulin gtt








DVT/GI PPX 


Discussed with RN and rt


remains critically ill 


cct 30 min wo overlap











AZAM REAGAN MD                 Aug 12, 2020 10:36

## 2020-08-13 NOTE — PDOC
[employer] PROGRESS NOTE


Date of Service


DOS:


DATE: 8/13/20 


TIME: 12:03





Chief Complaint


Chief Complaint


Acute hypoxic respiratory failure secondary to COVID-19 pneumonia. s/p 

intubation, 8/7


Bilateral pulmonary infiltrates - Abnormal CT chest consistent with pneumonia 

with ground glass opacities bilaterally, poor contrast, pulmonary embolism could

not be ruled out, but he has low clinical suspicion for pulmonary embolism and 

as such no further investigation is needed.


Underlying morbid obesity.


Diabetes.


Hypertension.


Sepsis - Fever and leukocytosis. Allergy to Zosyn.





CC time 39 minutes





History of Present Illness


History of Present Illness


8/13/2020


Patient is seen and examined in ICU


Patient is intubated, on vent AC/26/600/50% FiO2 with 8 PEEP and sedated on 

Precedex, Versed and Fentanyl


Glycemic control obtained


Discussed with RN


Charts reviewed





8/12/2020


Patient is seen and examined in ICU


Patient is intubated, on vent AC/26/600/45% FiO2 with 8 PEEP and sedated on 

Precedex, Versed and Fentanyl


OG Tube at 70cc/hr


Discussed with RN


Charts reviewed








08/11/2020


Patient is seen and examined in ICU


Patient is intubated, on vent AC/26/600/70% FiO2 and sedated on Precedex, Versed

and Fentanyl


Discussed with RN


Charts reviewed





08/10/2020


Patient is seen and examined in ICU


Patient is intubated, on vent AC/26/600/55% FiO2 and sedated on Precedex, Versed

and Fentanyl


Received plasma and Remdesivir


Patient had some fever


Discussed with RN


Charts reviewed





Mr Dior is a 41-year-old morbidly obese male with a BMI of 51, DM2, HTN, 

diabetic foot ulcers who was brought into the hospital complaining of shortness 

of breath for 5 days prior to admit.  He was tested positive for SARS-CoV-2 

(COVID 19).  He had a cough, which has been nonproductive.  No headaches, no 

nausea, vomiting, no diarrhea, no dysuria, no focal weakness. Required 2L NCO2 

to maintain O2 saturations > 88%. He had CTA chest nondiagnostic for pulmonary 

embolism, but with multifocal ground glass opacities bilaterally predominantly 

in the left upper and lower lobes and concerning for COVID pneumonia. His T-max 

is 100.2.  Pulm and ID consulted, admitted for further care.





8/5: On 4L NCO2, asking to leave the hospital.


8/6: Resting on 15L NCO2, trying to take off O2. Transitioned to 35% vapotherm


8/7: Transferred to ICU for Vapotherm. ABG 63 on 90%.  Glucose in the 300s 

despite high dosing of insulin. No abdominal pain. S/p convalescent FFP


8/8: Afebrile. Seen on vent. He was intubated 8/7, on vent, sedated on versed, 

fentanyl, precedex, small ett secretion, on peep 10, fio2 60%.  Had a right IJ 

placed by anesthesia had bleeding for 15 to 20 minutes after placement.  Labs 

with WBC 15.7, Hb 12, platelets 255, , K5, BUN 58, CR 1.6, glucose 132, 

phosphorus 5.7.





Febrile 101.1 F.  Seen on vent AC mode tidal volume 600 FiO2 40%, PEEP of 9.  

Glucose trending downward.  Started on tube feeds.  Still with good urine 

output.





Plan:


Add Carbapenem per ID. +/- zyvox


Cont ICU, critically ill





Vitals/I&O


Vitals/I&O:





                                   Vital Signs








  Date Time  Temp Pulse Resp B/P (MAP) Pulse Ox O2 Delivery O2 Flow Rate FiO2


 


8/13/20 09:00  73  145/69    


 


8/13/20 08:15     92 Ventilator  


 


8/13/20 06:00   26     


 


8/13/20 04:00 98.4       





 98.4       


 


8/13/20 04:00       40.0 














                                    I & O   


 


 8/12/20 8/12/20 8/13/20





 15:00 23:00 07:00


 


Intake Total 450 ml 3238 ml 3279.5 ml


 


Output Total 660 ml 600 ml 815 ml


 


Balance -210 ml 2638 ml 2464.5 ml











Physical Exam


Physical Exam:


GENERAL: Propped up in bed, orally intubated and sedated. + mitts 


HEENT:  Pupils equal. ETT and OGT in place 


NECK:  Supple


LUNGS:  Clear.


HEART:  S1, S2 regular.


ABDOMEN:  Obese, bowel sounds present, soft


: Hopkins in place 


EXTREMITIES:  No edema or cyanosis. SCDs bilaterally. 


SKIN:  No signs of rash. 


NEUROLOGIC:  Sedated


[facility] (8/7) without signs of complications


General:  Other (intubated and sedated)


Heart:  Regular rate, Normal S1, Normal S2, No murmurs


Abdomen:  Normal bowel sounds, No masses


Extremities:  Normal pulses, No tenderness/swelling


Skin:  No significant lesion





Labs


Labs:





Laboratory Tests








Test


 8/12/20


12:35 8/12/20


13:36 8/12/20


14:31 8/12/20


15:34


 


Glucose (Fingerstick)


 274 mg/dL


(70-99) 264 mg/dL


(70-99) 240 mg/dL


(70-99) 251 mg/dL


(70-99)


 


Test


 8/12/20


16:32 8/12/20


17:47 8/12/20


18:43 8/12/20


19:46


 


Glucose (Fingerstick)


 220 mg/dL


(70-99) 198 mg/dL


(70-99) 175 mg/dL


(70-99) 172 mg/dL


(70-99)


 


Test


 8/12/20


20:42 8/12/20


21:46 8/12/20


23:15 8/13/20


00:15


 


Glucose (Fingerstick)


 173 mg/dL


(70-99) 167 mg/dL


(70-99) 158 mg/dL


(70-99) 149 mg/dL


(70-99)


 


Test


 8/13/20


01:20 8/13/20


02:19 8/13/20


03:25 8/13/20


04:29


 


Glucose (Fingerstick)


 142 mg/dL


(70-99) 132 mg/dL


(70-99) 146 mg/dL


(70-99) 146 mg/dL


(70-99)


 


Test


 8/13/20


06:00 8/13/20


06:39 8/13/20


08:07 8/13/20


08:10


 


Sodium Level


 147 mmol/L


(136-145) 


 


 





 


Potassium Level


 4.8 mmol/L


(3.5-5.1) 


 


 





 


Chloride Level


 113 mmol/L


() 


 


 





 


Carbon Dioxide Level


 27 mmol/L


(21-32) 


 


 





 


Anion Gap 7 (6-14)    


 


Blood Urea Nitrogen


 60 mg/dL


(8-26) 


 


 





 


Creatinine


 1.1 mg/dL


(0.7-1.3) 


 


 





 


Estimated GFR


(Cockcroft-Gault) 89.3 


 


 


 





 


Glucose Level


 147 mg/dL


(70-99) 


 


 





 


Calcium Level


 8.1 mg/dL


(8.5-10.1) 


 


 





 


Glucose (Fingerstick)


 


 166 mg/dL


(70-99) 137 mg/dL


(70-99) 





 


O2 Saturation    89 % (92-99) 


 


Arterial Blood pH


 


 


 


 7.42


(7.35-7.45)


 


Arterial Blood pCO2 at


Patient Temp 


 


 


 33 mmHg


(35-46)


 


Arterial Blood pO2 at Patient


Temp 


 


 


 57 mmHg


()


 


Arterial Blood HCO3


 


 


 


 21 mmol/L


(21-28)


 


Arterial Blood Base Excess


 


 


 


 -3 mmol/L


(-3-3)


 


FiO2    45 


 


Test


 8/13/20


09:55 


 


 





 


Glucose (Fingerstick)


 125 mg/dL


(70-99) 


 


 














Assessment and Plan


Assessmemt and Plan


Problems


Medical Problems:


(1) COVID-19 virus detected


Status: Acute  





Assessment


Acute hypoxic respiratory failure secondary to COVID-19 pneumonia. s/p 

intubation, 8/7


Bilateral pulmonary infiltrates - Abnormal CT chest consistent with pneumonia 

with ground glass opacities bilaterally, poor contrast, pulmonary embolism could

not be ruled out, but he has low clinical suspicion for pulmonary embolism and 

as such no further investigation is needed.


Underlying morbid obesity.


Diabetes.


Hypertension.


Sepsis - Fever and leukocytosis. Allergy to Zosyn.





Plan


ICU monitoring


Vent weaning


Respiratory isolation


IV insulin drip


Mitts for patient safety


SCD


IV steroids


IV antibiotics


DVT prophylaxis


Full Code


Total time 31-minutes











Comment


Review of Relevant


I have reviewed the following items joseph (where applicable) has been applied.





Justicifation of Admission Dx:


Justifications for Admission:


Justification of Admission Dx:  Yes


Respiratory Failure:  Non-Cardiac Pulm Edema











JAIRO BUSCH III DO           Aug 13, 2020 12:03

## 2020-08-13 NOTE — NUR
SS following up with discharge planning. SS reviewed pt chart and discussed with pt RN. Pt 
remains on the vent at this time. COVID19 positive. Pt on IV Meropenem. SS [facility], and [facility] and inquired about LTACH 
requirements for COVID19. SS was notified that if still positive with no negative test pt 
will need fourteen days in the hospital and fever free for three days. Pt's RN notified. SS 
will continue to follow for discharge planning.

## 2020-08-13 NOTE — PDOC
Renal-Progress Notes


Subjective Notes


Notes


ON THE VENT





History of Present Illness


Hx of present illness


STABLE





Vitals


Vitals





Vital Signs








  Date Time  Temp Pulse Resp B/P (MAP) Pulse Ox O2 Delivery O2 Flow Rate FiO2


 


8/13/20 09:00  73  145/69    


 


8/13/20 08:15     92 Ventilator  


 


8/13/20 06:00   26     


 


8/13/20 04:00 98.4       





 98.4       


 


8/13/20 04:00       40.0 








Weight


Weight [ ]





I.O.


Intake and Output











Intake and Output 


 


 8/13/20





 07:00


 


Intake Total 6967.5 ml


 


Output Total 2075 ml


 


Balance 4892.5 ml


 


 


 


IV Total 3212.5 ml


 


Tube Feeding 2730 ml


 


Other 1025 ml


 


Output Urine Total 2075 ml











Labs


Labs





Laboratory Tests








Test


 8/12/20


12:35 8/12/20


13:36 8/12/20


14:31 8/12/20


15:34


 


Glucose (Fingerstick)


 274 mg/dL


(70-99) 264 mg/dL


(70-99) 240 mg/dL


(70-99) 251 mg/dL


(70-99)


 


Test


 8/12/20


16:32 8/12/20


17:47 8/12/20


18:43 8/12/20


19:46


 


Glucose (Fingerstick)


 220 mg/dL


(70-99) 198 mg/dL


(70-99) 175 mg/dL


(70-99) 172 mg/dL


(70-99)


 


Test


 8/12/20


20:42 8/12/20


21:46 8/12/20


23:15 8/13/20


00:15


 


Glucose (Fingerstick)


 173 mg/dL


(70-99) 167 mg/dL


(70-99) 158 mg/dL


(70-99) 149 mg/dL


(70-99)


 


Test


 8/13/20


01:20 8/13/20


02:19 8/13/20


03:25 8/13/20


04:29


 


Glucose (Fingerstick)


 142 mg/dL


(70-99) 132 mg/dL


(70-99) 146 mg/dL


(70-99) 146 mg/dL


(70-99)


 


Test


 8/13/20


06:00 8/13/20


06:39 8/13/20


08:07 8/13/20


08:10


 


Sodium Level


 147 mmol/L


(136-145) 


 


 





 


Potassium Level


 4.8 mmol/L


(3.5-5.1) 


 


 





 


Chloride Level


 113 mmol/L


() 


 


 





 


Carbon Dioxide Level


 27 mmol/L


(21-32) 


 


 





 


Anion Gap 7 (6-14)    


 


Blood Urea Nitrogen


 60 mg/dL


(8-26) 


 


 





 


Creatinine


 1.1 mg/dL


(0.7-1.3) 


 


 





 


Estimated GFR


(Cockcroft-Gault) 89.3 


 


 


 





 


Glucose Level


 147 mg/dL


(70-99) 


 


 





 


Calcium Level


 8.1 mg/dL


(8.5-10.1) 


 


 





 


Glucose (Fingerstick)


 


 166 mg/dL


(70-99) 137 mg/dL


(70-99) 





 


O2 Saturation    89 % (92-99) 


 


Arterial Blood pH


 


 


 


 7.42


(7.35-7.45)


 


Arterial Blood pCO2 at


Patient Temp 


 


 


 33 mmHg


(35-46)


 


Arterial Blood pO2 at Patient


Temp 


 


 


 57 mmHg


()


 


Arterial Blood HCO3


 


 


 


 21 mmol/L


(21-28)


 


Arterial Blood Base Excess


 


 


 


 -3 mmol/L


(-3-3)


 


FiO2    45 


 


Test


 8/13/20


09:55 


 


 





 


Glucose (Fingerstick)


 125 mg/dL


(70-99) 


 


 














Micro


Micro





Microbiology


8/9/20 Blood Culture - Preliminary, Resulted


         NO GROWTH AFTER 4 DAYS





Review of Systems


Constitutional:  yes: other (UNABLE TO OBTAIN)





Physical Exam


General Appearance:  no apparent distress


Respiratory:  decreased breath sounds


Heart:  S1S2


Abdomen:  soft


Genitourinary:  bladder flat


Extremities:  pulses present





Assessment


Assessment


IMP





MILD HYPERKALEMIA-RESOLVED


MILD HYPERNATREMIA


COVID 19 PNEUMONIA


ACUTE RESP FAILURE


DM II


HTN


CKD 3-CR STABLE AT 1.5


LEUCOCYTOSIS





PLAN





VENT SUPPORT


RENAL FXN HAS REMAINED STABLE


LABS IN AM


WILL FOLLOW











ADRIAN SALAMANCA MD                 Aug 13, 2020 11:30

## 2020-08-13 NOTE — PDOC
Infectious Disease Note


Subjective:


Subjective


 


Pt remains intubated/sedated


Afebrile last 48 hours


remains on insulin drip


d/w rn





Vital Signs:


Vital Signs





Vital Signs








  Date Time  Temp Pulse Resp B/P (MAP) Pulse Ox O2 Delivery O2 Flow Rate FiO2


 


8/13/20 06:00  74 26 146/68 (94) 92 Ventilator  


 


8/13/20 04:00 98.4       





 98.4       


 


8/13/20 04:00       40.0 











Physical Exam:


PHYSICAL EXAM


GENERAL: Propped up in bed, orally intubated and sedated. + mitts 


HEENT:  Pupils equal. ETT and OGT in place 


NECK:  Supple


LUNGS:  Clear.


HEART:  S1, S2 regular.


ABDOMEN:  Obese, bowel sounds present, soft


: Hopkins in place 


EXTREMITIES:  No edema or cyanosis. SCDs bilaterally. 


SKIN:  No signs of rash. 


NEUROLOGIC:  Sedated


[facility] (8/7) without signs of complications





Medications:


Inpatient Meds:





Current Medications








 Medications


  (Trade)  Dose


 Ordered  Sig/Toni  Start Time


 Stop Time Status Last Admin


Dose Admin


 


 Acetaminophen


  (Tylenol)  650 mg  PRN Q6HRS  PRN  8/9/20 09:15


    8/10/20 05:49


650 MG


 


 Amino Acids/


 Glycerin/


 Electrolytes  1,000 ml @ 


 80 mls/hr  [id number]  8/8/20 12:45


   Cancel  





 


 Amlodipine


 Besylate


  (Norvasc)  10 mg  DAILY  8/6/20 10:00


 8/6/20 10:35 DC  





 


 Atropine Sulfate


  (ATROPINE 0.5mg


 SYRINGE)  0.5 mg  PRN Q5MIN  PRN  8/7/20 11:30


     





 


 Cefazolin Sodium


  (Ancef)  1 gm  1X  ONCE  8/4/20 13:15


 8/4/20 13:16 UNV  





 


 Dexmedetomidine


 HCl 400 mcg/


 Sodium Chloride  100 ml @ 0


 mls/hr  CONT  PRN  8/7/20 11:30


    8/13/20 03:33


27.6 MLS/HR


 


 Dextrose


  (Dextrose


 50%-Water Syringe)  12.5 gm  PRN Q15MIN  PRN  8/4/20 18:00


    8/8/20 21:00


12.5 GM


 


 Enoxaparin Sodium


  (Lovenox 40mg


 Syringe)  40 mg  Q12HR  8/7/20 10:00


    8/12/20 21:55


40 MG


 


 Fentanyl Citrate  55 ml @ 0


 mls/hr  CONT  PRN  8/7/20 19:00


    8/13/20 00:32


4 MLS/HR


 


 Glyburide


  (Diabeta)  5 mg  BID  8/11/20 21:00


    8/12/20 21:55


5 MG


 


 Haloperidol


 Lactate


  (Haldol Inj)  5 mg  PRN Q3HRS  PRN  8/7/20 10:45


    8/7/20 10:48


5 MG


 


 Heparin Sodium


  (Porcine)


  (Heparin Sodium)  5,000 unit  Q8HRS  8/6/20 14:00


 8/7/20 09:18 DC 8/7/20 06:15


5,000 UNIT


 


 Hydralazine HCl


  (Apresoline Inj)  10 mg  PRN Q4HRS  PRN  8/7/20 08:00


    8/7/20 13:14


10 MG


 


 Info


  (CONTRAST GIVEN


 -- Rx MONITORING)  1 each  PRN DAILY  PRN  8/4/20 10:15


 8/6/20 10:14 DC  





 


 Insulin Glargine


  (Lantus Syringe)  70 unit  BID  8/11/20 09:00


    8/12/20 21:56


70 UNIT


 


 Insulin Human


 Lispro


  (HumaLOG)  50 units  1X  ONCE  8/11/20 18:00


 8/11/20 18:01 DC 8/11/20 17:51


50 UNITS


 


 Insulin Human


 Regular 100 unit/


 Sodium Chloride  101 ml @ 0


 mls/hr  CONT  PRN  8/12/20 08:00


    8/13/20 06:02


12.9 MLS/HR


 


 Iohexol


  (Omnipaque 350


 Mg/ml)  80 ml  1X  ONCE  8/4/20 10:15


 8/4/20 10:16 DC 8/4/20 10:30


80 ML


 


 Labetalol HCl


  (Normodyne Iv


 Push)  20 mg  PRN Q4HRS  PRN  8/7/20 08:00


    8/11/20 11:42


20 MG


 


 Lidocaine HCl


  (Xylocaine 2%


 Topical 5gm Tube)  5 soheila  STK-MED ONCE  8/11/20 12:00


 8/11/20 12:01 DC  





 


 Lorazepam


  (Ativan Inj)  1 mg  PRN Q4HRS  PRN  8/7/20 10:45


 8/7/20 10:45 DC  





 


 Meropenem 500 mg/


 Sodium Chloride  50 ml @ 


 100 mls/hr  Q6HRS  8/9/20 12:00


    8/13/20 06:01


100 MLS/HR


 


 Methylprednisolone


 Sodium Succinate


  (SOLU-Medrol


 40MG VIAL)  40 mg  BID  8/4/20 22:00


    8/12/20 21:56


40 MG


 


 Metoprolol


 Tartrate


  (Lopressor)  100 mg  BID  8/7/20 09:00


    8/12/20 21:55


100 MG


 


 Midazolam HCl  100 ml @ 1


 mls/hr  CONT  PRN  8/7/20 15:15


    8/13/20 04:35


8 MLS/HR


 


 Multivitamins


  (Thera M Plus)  1 tab  DAILY  8/5/20 09:00


    8/12/20 08:16


1 TAB


 


 Naloxone HCl


  (Narcan)  0.4 mg  PRN Q2MIN  PRN  8/7/20 15:15


     





 


 Nicardipine HCl


 50 mg/Sodium


 Chloride  250 ml @ 


 25 mls/hr  CONT  PRN  8/7/20 04:15


    8/13/20 04:35


50 MLS/HR


 


 Non-Formulary


 Medication 1 ea/


 Sodium Chloride  230 ml @ 


 460 mls/hr  Q24H  8/7/20 09:00


 8/10/20 09:39 DC 8/10/20 09:00


460 MLS/HR


 


 Ondansetron HCl


  (Zofran)  4 mg  PRN Q8HRS  PRN  8/4/20 11:45


 8/5/20 11:44 DC  





 


 Pantoprazole


 Sodium


  (PROTONIX VIAL


 for IV PUSH)  40 mg  DAILYAC  8/9/20 07:30


    8/12/20 07:14


40 MG


 


 Propofol  100 ml @ 


 As Directed  STK-MED ONCE  8/7/20 15:09


 8/7/20 15:10 DC  





 


 Sodium Bicarbonate


  (Sodium Bicarb


 Adult 8.4% Syr)  50 meq  1X  ONCE  8/7/20 18:15


 8/7/20 18:16 DC 8/7/20 18:17


50 MEQ


 


 Sodium Chloride  1,000 ml @ 


 25 mls/hr  Q24H  8/7/20 15:04


    8/12/20 20:14


25 MLS/HR


 


 Sterile Water


  (WATER for RESP)  1,000 ml  CONT  PRN  8/6/20 11:45


    8/7/20 08:22


1,000 ML


 


 Succinylcholine


 Chloride


  (Anectine)  200 mg  STK-MED ONCE  8/7/20 15:17


 8/7/20 15:18 DC  





 


 Thrombin  20,000 unit  1X  ONCE  8/7/20 17:00


 8/7/20 17:01 DC  





 


 Vecuronium Bromide


  (Norcuron Bolus)  6 mg  PRN Q6HRS  PRN  8/7/20 15:15


    8/11/20 16:17


6 MG











Labs:


Lab





Laboratory Tests








Test


 8/12/20


08:00 8/12/20


08:23 8/12/20


09:18 8/12/20


10:33


 


O2 Saturation 91 % (92-99)    


 


Arterial Blood pH


 7.43


(7.35-7.45) 


 


 





 


Arterial Blood pCO2 at


Patient Temp 34 mmHg


(35-46) 


 


 





 


Arterial Blood pO2 at Patient


Temp 63 mmHg


() 


 


 





 


Arterial Blood HCO3


 22 mmol/L


(21-28) 


 


 





 


Arterial Blood Base Excess


 -2 mmol/L


(-3-3) 


 


 





 


FiO2 45    


 


Glucose (Fingerstick)


 


 345 mg/dL


(70-99) 346 mg/dL


(70-99) 330 mg/dL


(70-99)


 


Test


 8/12/20


12:35 8/12/20


13:36 8/12/20


14:31 8/12/20


15:34


 


Glucose (Fingerstick)


 274 mg/dL


(70-99) 264 mg/dL


(70-99) 240 mg/dL


(70-99) 251 mg/dL


(70-99)


 


Test


 8/12/20


16:32 8/12/20


17:47 8/12/20


18:43 8/12/20


19:46


 


Glucose (Fingerstick)


 220 mg/dL


(70-99) 198 mg/dL


(70-99) 175 mg/dL


(70-99) 172 mg/dL


(70-99)


 


Test


 8/12/20


20:42 8/12/20


21:46 8/12/20


23:15 8/13/20


00:15


 


Glucose (Fingerstick)


 173 mg/dL


(70-99) 167 mg/dL


(70-99) 158 mg/dL


(70-99) 149 mg/dL


(70-99)


 


Test


 8/13/20


01:20 8/13/20


02:19 8/13/20


03:25 8/13/20


04:29


 


Glucose (Fingerstick)


 142 mg/dL


(70-99) 132 mg/dL


(70-99) 146 mg/dL


(70-99) 146 mg/dL


(70-99)


 


Test


 8/13/20


06:00 8/13/20


06:39 


 





 


Sodium Level


 147 mmol/L


(136-145) 


 


 





 


Potassium Level


 4.8 mmol/L


(3.5-5.1) 


 


 





 


Chloride Level


 113 mmol/L


() 


 


 





 


Carbon Dioxide Level


 27 mmol/L


(21-32) 


 


 





 


Anion Gap 7 (6-14)    


 


Blood Urea Nitrogen


 60 mg/dL


(8-26) 


 


 





 


Creatinine


 1.1 mg/dL


(0.7-1.3) 


 


 





 


Estimated GFR


(Cockcroft-Gault) 89.3 


 


 


 





 


Glucose Level


 147 mg/dL


(70-99) 


 


 





 


Calcium Level


 8.1 mg/dL


(8.5-10.1) 


 


 





 


Glucose (Fingerstick)


 


 166 mg/dL


(70-99) 


 














Objective:


Assessment:


Fever resolved


COVID-19 positive, 7/29. s/p convalescent plasma 


Bilateral pulmonary infiltrates.


Leukocytosis, on steroids, increased ,could be reactive


Allergy to Zosyn. h/o hives and swelling. 


Acute respiratory failure s/p intubation, 8/7


Renal insufficiency  


Diabetes.  Poorly controlled


Hypertension.


Obesity.


Constipation





Plan:


Plan of Care


Cont Merrem 8/9


Remdesivir and steroids


s/p convalescent plasma 


Maintain aspiration precautions


Airborne isolation for COVID-19


Discussed with nursing





Critically ill











SATNAM SALINAS MD           Aug 13, 2020 07:52

## 2020-08-14 NOTE — PDOC
Renal-Progress Notes


Subjective Notes


Notes


NO NEW COMPLAINTS





History of Present Illness


Hx of present illness


STABLE





Vitals


Vitals





Vital Signs








  Date Time  Temp Pulse Resp B/P (MAP) Pulse Ox O2 Delivery O2 Flow Rate FiO2


 


8/14/20 11:00  73 26 148/72 (97) 97 Ventilator  


 


8/14/20 08:00 98.8       





 98.8       


 


8/14/20 04:19       40.0 








Weight


Weight [ ]





I.O.


Intake and Output











Intake and Output 


 


 8/14/20





 07:00


 


Intake Total 3981.5 ml


 


Output Total 3275 ml


 


Balance 706.5 ml


 


 


 


IV Total 1481.5 ml


 


Tube Feeding 2100 ml


 


Other 400 ml


 


Output Urine Total 3275 ml


 


Gastric Drainage Total 0 ml











Labs


Labs





Laboratory Tests








Test


 8/13/20


12:43 8/13/20


15:07 8/13/20


16:24 8/13/20


18:43


 


Glucose (Fingerstick)


 117 mg/dL


(70-99) 154 mg/dL


(70-99) 138 mg/dL


(70-99) 114 mg/dL


(70-99)


 


Test


 8/13/20


20:01 8/13/20


20:59 8/13/20


21:50 8/13/20


23:04


 


Glucose (Fingerstick)


 131 mg/dL


(70-99) 159 mg/dL


(70-99) 145 mg/dL


(70-99) 149 mg/dL


(70-99)


 


Test


 8/14/20


01:09 8/14/20


02:01 8/14/20


03:07 8/14/20


04:02


 


Glucose (Fingerstick)


 120 mg/dL


(70-99) 115 mg/dL


(70-99) 129 mg/dL


(70-99) 131 mg/dL


(70-99)


 


Test


 8/14/20


05:08 8/14/20


06:00 8/14/20


06:04 8/14/20


07:31


 


Glucose (Fingerstick)


 164 mg/dL


(70-99) 


 148 mg/dL


(70-99) 148 mg/dL


(70-99)


 


Sodium Level


 


 143 mmol/L


(136-145) 


 





 


Potassium Level


 


 5.2 mmol/L


(3.5-5.1) 


 





 


Chloride Level


 


 112 mmol/L


() 


 





 


Carbon Dioxide Level


 


 25 mmol/L


(21-32) 


 





 


Anion Gap  6 (6-14)   


 


Blood Urea Nitrogen


 


 45 mg/dL


(8-26) 


 





 


Creatinine


 


 1.1 mg/dL


(0.7-1.3) 


 





 


Estimated GFR


(Cockcroft-Gault) 


 89.3 


 


 





 


Glucose Level


 


 164 mg/dL


(70-99) 


 





 


Calcium Level


 


 8.1 mg/dL


(8.5-10.1) 


 





 


Test


 8/14/20


08:10 8/14/20


09:07 8/14/20


10:17 





 


O2 Saturation 93 % (92-99)    


 


Arterial Blood pH


 7.42


(7.35-7.45) 


 


 





 


Arterial Blood pCO2 at


Patient Temp 29 mmHg


(35-46) 


 


 





 


Arterial Blood pO2 at Patient


Temp 68 mmHg


() 


 


 





 


Arterial Blood HCO3


 18 mmol/L


(21-28) 


 


 





 


Arterial Blood Base Excess


 -5 mmol/L


(-3-3) 


 


 





 


FiO2 50%+8    


 


Glucose (Fingerstick)


 


 148 mg/dL


(70-99) 135 mg/dL


(70-99) 














Micro


Micro





Microbiology


8/9/20 Blood Culture - Final, Complete


         NO GROWTH AFTER 5 DAYS





Review of Systems


Constitutional:  yes: other (UNABLE TO OBTAIN)





Physical Exam


General Appearance:  no apparent distress


Respiratory:  decreased breath sounds


Heart:  S1S2


Abdomen:  soft


Genitourinary:  bladder flat


Extremities:  pulses present





Assessment


Assessment


IMP





MILD HYPERKALEMIA-RESOLVED


MILD HYPERNATREMIA


COVID 19 PNEUMONIA


ACUTE RESP FAILURE


DM II


HTN


CKD 3-CR STABLE AT 1.2-2.0 AT BASELINE


LEUCOCYTOSIS





PLAN





IV LASIX


VENT SUPPORT


RENAL FXN HAS REMAINED STABLE


LABS IN AM


WILL FOLLOW











ADRIAN SALAMANCA MD                 Aug 14, 2020 11:26

## 2020-08-14 NOTE — PDOC
Infectious Disease Note


Subjective:


Subjective


 


Pt remains intubated/sedated


FiO2 at 50%


no fevers for 96 hrs


Blood sugar control improving


d/w rn





Vital Signs:


Vital Signs





Vital Signs








  Date Time  Temp Pulse Resp B/P (MAP) Pulse Ox O2 Delivery O2 Flow Rate FiO2


 


8/14/20 08:00 98.8 75 26 139/67 (91) 96 Ventilator  





 98.8       


 


8/14/20 04:19       40.0 











Physical Exam:


PHYSICAL EXAM


GENERAL: Propped up in bed, orally intubated and sedated. + mitts 


HEENT:  Pupils equal. ETT and OGT in place 


NECK:  Supple


LUNGS:  Clear.


HEART:  S1, S2 regular.


ABDOMEN:  Obese, bowel sounds present, soft


: Hopkins in place 


EXTREMITIES:  No edema or cyanosis. SCDs bilaterally. 


SKIN:  No signs of rash. 


NEUROLOGIC:  Sedated


RIJ (8/7) without signs of complications





Medications:


Inpatient Meds:





Current Medications








 Medications


  (Trade)  Dose


 Ordered  Sig/Toni  Start Time


 Stop Time Status Last Admin


Dose Admin


 


 Acetaminophen


  (Tylenol)  650 mg  PRN Q6HRS  PRN  8/9/20 09:15


    8/10/20 05:49


650 MG


 


 Amino Acids/


 Glycerin/


 Electrolytes  1,000 ml @ 


 80 mls/hr  H02H30H  8/8/20 12:45


   Cancel  





 


 Amlodipine


 Besylate


  (Norvasc)  10 mg  DAILY  8/6/20 10:00


 8/6/20 10:35 DC  





 


 Atropine Sulfate


  (ATROPINE 0.5mg


 SYRINGE)  0.5 mg  PRN Q5MIN  PRN  8/7/20 11:30


     





 


 Cefazolin Sodium


  (Ancef)  1 gm  1X  ONCE  8/4/20 13:15


 8/4/20 13:16 UNV  





 


 Dexmedetomidine


 HCl 400 mcg/


 Sodium Chloride  100 ml @ 0


 mls/hr  CONT  PRN  8/7/20 11:30


    8/14/20 05:23


27.6 MLS/HR


 


 Dextrose


  (Dextrose


 50%-Water Syringe)  12.5 gm  PRN Q15MIN  PRN  8/4/20 18:00


    8/8/20 21:00


12.5 GM


 


 Enoxaparin Sodium


  (Lovenox 40mg


 Syringe)  40 mg  Q12HR  8/7/20 10:00


    8/14/20 07:24


40 MG


 


 Fentanyl Citrate  55 ml @ 0


 mls/hr  CONT  PRN  8/7/20 19:00


    8/14/20 02:06


4 MLS/HR


 


 Glyburide


  (Diabeta)  5 mg  BID  8/11/20 21:00


    8/14/20 07:23


5 MG


 


 Haloperidol


 Lactate


  (Haldol Inj)  5 mg  PRN Q3HRS  PRN  8/7/20 10:45


    8/7/20 10:48


5 MG


 


 Heparin Sodium


  (Porcine)


  (Heparin Sodium)  5,000 unit  Q8HRS  8/6/20 14:00


 8/7/20 09:18 DC 8/7/20 06:15


5,000 UNIT


 


 Hydralazine HCl


  (Apresoline Inj)  10 mg  PRN Q4HRS  PRN  8/7/20 08:00


    8/7/20 13:14


10 MG


 


 Info


  (CONTRAST GIVEN


 -- Rx MONITORING)  1 each  PRN DAILY  PRN  8/4/20 10:15


 8/6/20 10:14 DC  





 


 Insulin Glargine


  (Lantus Syringe)  70 unit  BID  8/11/20 09:00


    8/13/20 21:45


70 UNIT


 


 Insulin Human


 Lispro


  (HumaLOG)  50 units  1X  ONCE  8/11/20 18:00


 8/11/20 18:01 DC 8/11/20 17:51


50 UNITS


 


 Insulin Human


 Regular 100 unit/


 Sodium Chloride  101 ml @ 0


 mls/hr  CONT  PRN  8/12/20 08:00


    8/13/20 22:28


14.9 MLS/HR


 


 Iohexol


  (Omnipaque 350


 Mg/ml)  80 ml  1X  ONCE  8/4/20 10:15


 8/4/20 10:16 DC 8/4/20 10:30


80 ML


 


 Labetalol HCl


  (Normodyne Iv


 Push)  20 mg  PRN Q4HRS  PRN  8/7/20 08:00


    8/11/20 11:42


20 MG


 


 Lidocaine HCl


  (Xylocaine 2%


 Topical 5gm Tube)  5 soheila  STK-MED ONCE  8/11/20 12:00


 8/11/20 12:01 DC  





 


 Lorazepam


  (Ativan Inj)  1 mg  PRN Q4HRS  PRN  8/7/20 10:45


 8/7/20 10:45 DC  





 


 Meropenem 500 mg/


 Sodium Chloride  50 ml @ 


 100 mls/hr  Q6HRS  8/9/20 12:00


    8/14/20 05:57


100 MLS/HR


 


 Methylprednisolone


 Sodium Succinate


  (SOLU-Medrol


 40MG VIAL)  40 mg  BID  8/4/20 22:00


    8/14/20 07:23


40 MG


 


 Metoprolol


 Tartrate


  (Lopressor)  100 mg  BID  8/7/20 09:00


    8/14/20 07:24


100 MG


 


 Midazolam HCl  100 ml @ 1


 mls/hr  CONT  PRN  8/7/20 15:15


    8/14/20 02:05


8 MLS/HR


 


 Multivitamins


  (Thera M Plus)  1 tab  DAILY  8/5/20 09:00


    8/14/20 07:24


1 TAB


 


 Naloxone HCl


  (Narcan)  0.4 mg  PRN Q2MIN  PRN  8/7/20 15:15


     





 


 Nicardipine HCl


 50 mg/Sodium


 Chloride  250 ml @ 


 25 mls/hr  CONT  PRN  8/7/20 04:15


    8/13/20 04:35


50 MLS/HR


 


 Non-Formulary


 Medication 1 ea/


 Sodium Chloride  230 ml @ 


 460 mls/hr  Q24H  8/7/20 09:00


 8/10/20 09:39 DC 8/10/20 09:00


460 MLS/HR


 


 Ondansetron HCl


  (Zofran)  4 mg  PRN Q8HRS  PRN  8/4/20 11:45


 8/5/20 11:44 DC  





 


 Pantoprazole


 Sodium


  (PROTONIX VIAL


 for IV PUSH)  40 mg  DAILYAC  8/9/20 07:30


    8/14/20 07:24


40 MG


 


 Propofol  100 ml @ 


 As Directed  STK-MED ONCE  8/7/20 15:09


 8/7/20 15:10 DC  





 


 Sodium Bicarbonate


  (Sodium Bicarb


 Adult 8.4% Syr)  50 meq  1X  ONCE  8/7/20 18:15


 8/7/20 18:16 DC 8/7/20 18:17


50 MEQ


 


 Sodium Chloride  1,000 ml @ 


 25 mls/hr  Q24H  8/7/20 15:04


    8/12/20 20:14


25 MLS/HR


 


 Sterile Water


  (WATER for RESP)  1,000 ml  CONT  PRN  8/6/20 11:45


    8/7/20 08:22


1,000 ML


 


 Succinylcholine


 Chloride


  (Anectine)  200 mg  STK-MED ONCE  8/7/20 15:17


 8/7/20 15:18 DC  





 


 Thrombin  20,000 unit  1X  ONCE  8/7/20 17:00


 8/7/20 17:01 DC  





 


 Vecuronium Bromide


  (Norcuron Bolus)  6 mg  PRN Q6HRS  PRN  8/7/20 15:15


    8/11/20 16:17


6 MG











Labs:


Lab





Laboratory Tests








Test


 8/13/20


09:55 8/13/20


12:43 8/13/20


15:07 8/13/20


16:24


 


Glucose (Fingerstick)


 125 mg/dL


(70-99) 117 mg/dL


(70-99) 154 mg/dL


(70-99) 138 mg/dL


(70-99)


 


Test


 8/13/20


18:43 8/13/20


20:01 8/13/20


20:59 8/13/20


21:50


 


Glucose (Fingerstick)


 114 mg/dL


(70-99) 131 mg/dL


(70-99) 159 mg/dL


(70-99) 145 mg/dL


(70-99)


 


Test


 8/13/20


23:04 8/14/20


01:09 8/14/20


02:01 8/14/20


03:07


 


Glucose (Fingerstick)


 149 mg/dL


(70-99) 120 mg/dL


(70-99) 115 mg/dL


(70-99) 129 mg/dL


(70-99)


 


Test


 8/14/20


04:02 8/14/20


05:08 8/14/20


06:00 8/14/20


06:04


 


Glucose (Fingerstick)


 131 mg/dL


(70-99) 164 mg/dL


(70-99) 


 148 mg/dL


(70-99)


 


Sodium Level


 


 


 143 mmol/L


(136-145) 





 


Potassium Level


 


 


 5.2 mmol/L


(3.5-5.1) 





 


Chloride Level


 


 


 112 mmol/L


() 





 


Carbon Dioxide Level


 


 


 25 mmol/L


(21-32) 





 


Anion Gap   6 (6-14)  


 


Blood Urea Nitrogen


 


 


 45 mg/dL


(8-26) 





 


Creatinine


 


 


 1.1 mg/dL


(0.7-1.3) 





 


Estimated GFR


(Cockcroft-Gault) 


 


 89.3 


 





 


Glucose Level


 


 


 164 mg/dL


(70-99) 





 


Calcium Level


 


 


 8.1 mg/dL


(8.5-10.1) 





 


Test


 8/14/20


07:31 8/14/20


08:10 


 





 


Glucose (Fingerstick)


 148 mg/dL


(70-99) 


 


 





 


O2 Saturation  93 % (92-99)   


 


Arterial Blood pH


 


 7.42


(7.35-7.45) 


 





 


Arterial Blood pCO2 at


Patient Temp 


 29 mmHg


(35-46) 


 





 


Arterial Blood pO2 at Patient


Temp 


 68 mmHg


() 


 





 


Arterial Blood HCO3


 


 18 mmol/L


(21-28) 


 





 


Arterial Blood Base Excess


 


 -5 mmol/L


(-3-3) 


 





 


FiO2  50%+8   











Objective:


Assessment:


Fever resolved


COVID-19 positive, 7/29. s/p convalescent plasma 


Bilateral pulmonary infiltrates.


Leukocytosis, on steroids, increased ,could be reactive


Allergy to Zosyn. h/o hives and swelling. 


Acute respiratory failure s/p intubation, 8/7


Renal insufficiency  


Diabetes.  Poorly controlled


Hypertension.


Obesity.


Constipation





Plan:


Plan of Care


Cont Merrem 8/9


Remdesivir and steroids


s/p convalescent plasma 


Maintain aspiration precautions


Airborne isolation for COVID-19


Discussed with nursing





Critically ill











SATNAM SALINAS MD           Aug 14, 2020 08:19

## 2020-08-14 NOTE — NUR
SS following up with discharge planning. SS reviewed pt chart and discussed with pt RN. Pt 
remains on the vent at this time. Pt on IV Meropenem. COVID19 positive. SS will continue to 
follow for discharge planning.

## 2020-08-14 NOTE — PDOC
[facility] PROGRESS NOTE


Date of Service


DOS:


DATE: 8/14/20 


TIME: 10:42





Chief Complaint


Chief Complaint


Acute hypoxic respiratory failure secondary to COVID-19 pneumonia. s/p 

intubation, 8/7


Bilateral pulmonary infiltrates - Abnormal CT chest consistent with pneumonia 

with ground glass opacities bilaterally, poor contrast, pulmonary embolism could

not be ruled out, but he has low clinical suspicion for pulmonary embolism and 

as such no further investigation is needed.


Underlying morbid obesity.


Diabetes.


Hypertension.


Sepsis - Fever and leukocytosis. Allergy to Zosyn.





History of Present Illness


History of Present Illness


8/14/2020


Patient is seen and examined in ICU


Patient is intubated, on vent AC/26/600/50% FiO2 with 8 PEEP and sedated on 

Precedex, Versed and Fentanyl


Kellie BSD


Discussed with RN


Charts reviewed





8/13/2020


Patient is seen and examined in ICU


Patient is intubated, on vent AC/26/600/50% FiO2 with 8 PEEP and sedated on 

Precedex, Versed and Fentanyl


Glycemic control obtained


Discussed with RN


Charts reviewed





8/12/2020


Patient is seen and examined in ICU


Patient is intubated, on vent AC/26/600/45% FiO2 with 8 PEEP and sedated on 

Precedex, Versed and Fentanyl


OG Tube at 70cc/hr


Discussed with RN


Charts reviewed








08/11/2020


Patient is seen and examined in ICU


Patient is intubated, on vent AC/26/600/70% FiO2 and sedated on Precedex, Versed

and Fentanyl


Discussed with RN


Charts reviewed





08/10/2020


Patient is seen and examined in ICU


Patient is intubated, on vent AC/26/600/55% FiO2 and sedated on Precedex, Versed

and Fentanyl


Received plasma and Remdesivir


Patient had some fever


Discussed with RN


Charts reviewed





Mr Dior is a 41-year-old morbidly obese male with a BMI of 51, DM2, HTN, 

diabetic foot ulcers who was brought into the hospital complaining of shortness 

of breath for 5 days prior to admit.  He was tested positive for SARS-CoV-2 

(COVID 19).  He had a cough, which has been nonproductive.  No headaches, no 

nausea, vomiting, no diarrhea, no dysuria, no focal weakness. Required 2L NCO2 

to maintain O2 saturations > 88%. He had CTA chest nondiagnostic for pulmonary 

embolism, but with multifocal ground glass opacities bilaterally predominantly 

in the left upper and lower lobes and concerning for COVID pneumonia. His T-max 

is 100.2.  Pulm and ID consulted, admitted for further care.





8/5: On 4L NCO2, asking to leave the hospital.


8/6: Resting on 15L NCO2, trying to take off O2. Transitioned to 35% vapotherm


8/7: Transferred to ICU for Vapotherm. ABG 63 on 90%.  Glucose in the 300s 

despite high dosing of insulin. No abdominal pain. S/p convalescent FFP


8/8: Afebrile. Seen on vent. He was intubated 8/7, on vent, sedated on versed, 

fentanyl, precedex, small ett secretion, on peep 10, fio2 60%.  Had a right IJ 

placed by anesthesia had bleeding for 15 to 20 minutes after placement.  Labs 

with WBC 15.7, Hb 12, platelets 255, , K5, BUN 58, CR 1.6, glucose 132, 

phosphorus 5.7.





Febrile 101.1 F.  Seen on vent AC mode tidal volume 600 FiO2 40%, PEEP of 9.  

Glucose trending downward.  Started on tube feeds.  Still with good urine 

output.





Plan:


Add Carbapenem per ID. +/- zyvox


Cont ICU, critically ill





Vitals/I&O


Vitals/I&O:





                                   Vital Signs








  Date Time  Temp Pulse Resp B/P (MAP) Pulse Ox O2 Delivery O2 Flow Rate FiO2


 


8/14/20 10:00  74 26 147/77 (100) 97 Ventilator  


 


8/14/20 08:00 98.8       





 98.8       


 


8/14/20 04:19       40.0 














                                    I & O   


 


 8/13/20 8/13/20 8/14/20





 15:00 23:00 07:00


 


Intake Total 400 ml 2341 ml 1240.5 ml


 


Output Total 1460 ml 840 ml 975 ml


 


Balance -1060 ml 1501 ml 265.5 ml











Physical Exam


Physical Exam:


GENERAL: Propped up in bed, orally intubated and sedated. + mitts 


HEENT:  Pupils equal. ETT and OGT in place 


NECK:  Supple


LUNGS:  Clear.


HEART:  S1, S2 regular.


ABDOMEN:  Obese, bowel sounds present, soft


: Hopkins in place 


EXTREMITIES:  No edema or cyanosis. SCDs bilaterally. 


SKIN:  No signs of rash. 


NEUROLOGIC:  Sedated


RIJ (8/7) without signs of complications


General:  Other (intubated and sedated)


Heart:  Regular rate, Normal S1, Normal S2, No murmurs


Abdomen:  Normal bowel sounds, No masses


Extremities:  Normal pulses, No tenderness/swelling


Skin:  No significant lesion





Labs


Labs:





Laboratory Tests








Test


 8/13/20


12:43 8/13/20


15:07 8/13/20


16:24 8/13/20


18:43


 


Glucose (Fingerstick)


 117 mg/dL


(70-99) 154 mg/dL


(70-99) 138 mg/dL


(70-99) 114 mg/dL


(70-99)


 


Test


 8/13/20


20:01 8/13/20


20:59 8/13/20


21:50 8/13/20


23:04


 


Glucose (Fingerstick)


 131 mg/dL


(70-99) 159 mg/dL


(70-99) 145 mg/dL


(70-99) 149 mg/dL


(70-99)


 


Test


 8/14/20


01:09 8/14/20


02:01 8/14/20


03:07 8/14/20


04:02


 


Glucose (Fingerstick)


 120 mg/dL


(70-99) 115 mg/dL


(70-99) 129 mg/dL


(70-99) 131 mg/dL


(70-99)


 


Test


 8/14/20


05:08 8/14/20


06:00 8/14/20


06:04 8/14/20


07:31


 


Glucose (Fingerstick)


 164 mg/dL


(70-99) 


 148 mg/dL


(70-99) 148 mg/dL


(70-99)


 


Sodium Level


 


 143 mmol/L


(136-145) 


 





 


Potassium Level


 


 5.2 mmol/L


(3.5-5.1) 


 





 


Chloride Level


 


 112 mmol/L


() 


 





 


Carbon Dioxide Level


 


 25 mmol/L


(21-32) 


 





 


Anion Gap  6 (6-14)   


 


Blood Urea Nitrogen


 


 45 mg/dL


(8-26) 


 





 


Creatinine


 


 1.1 mg/dL


(0.7-1.3) 


 





 


Estimated GFR


(Cockcroft-Gault) 


 89.3 


 


 





 


Glucose Level


 


 164 mg/dL


(70-99) 


 





 


Calcium Level


 


 8.1 mg/dL


(8.5-10.1) 


 





 


Test


 8/14/20


08:10 8/14/20


09:07 8/14/20


10:17 





 


O2 Saturation 93 % (92-99)    


 


Arterial Blood pH


 7.42


(7.35-7.45) 


 


 





 


Arterial Blood pCO2 at


Patient Temp 29 mmHg


(35-46) 


 


 





 


Arterial Blood pO2 at Patient


Temp 68 mmHg


() 


 


 





 


Arterial Blood HCO3


 18 mmol/L


(21-28) 


 


 





 


Arterial Blood Base Excess


 -5 mmol/L


(-3-3) 


 


 





 


FiO2 50%+8    


 


Glucose (Fingerstick)


 


 148 mg/dL


(70-99) 135 mg/dL


(70-99) 














Assessment and Plan


Assessmemt and Plan


Problems


Medical Problems:


(1) COVID-19 virus detected


Status: Acute  





Assessment


Acute hypoxic respiratory failure secondary to COVID-19 pneumonia. s/p 

intubation, 8/7


Bilateral pulmonary infiltrates - Abnormal CT chest consistent with pneumonia 

with ground glass opacities bilaterally, poor contrast, pulmonary embolism could

not be ruled out, but he has low clinical suspicion for pulmonary embolism and 

as such no further investigation is needed.


Underlying morbid obesity.


Diabetes.


Hypertension.


Sepsis - Fever and leukocytosis. Allergy to Zosyn.





Plan


ICU monitoring


Respiratory isolation


Vent weaning


IV insulin drip


Trend glucose


Mitts for patient safety


SCD


IV steroids


IV antibiotics


DVT prophylaxis


Home Meds








Comment


Review of Relevant


I have reviewed the following items joseph (where applicable) has been applied.





Justicifation of Admission Dx:


Justifications for Admission:


Justification of Admission Dx:  Yes


Respiratory Failure:  Non-Cardiac Pulm Edema











JAIRO BUSCH III DO           Aug 14, 2020 10:46

## 2020-08-14 NOTE — PDOC
PULMONARY PROGRESS NOTES


DATE: 8/14/20 


TIME: 09:39


Subjective


intubated 8/7, on vent, 


sedated on versed, fentanyl, precedex, a-febrile, 


on insulin gtt, now off cardene gtt 


no overnight concerns from nursing


Vitals





Vital Signs








  Date Time  Temp Pulse Resp B/P (MAP) Pulse Ox O2 Delivery O2 Flow Rate FiO2


 


8/14/20 09:00  74 26 149/76 (100) 97 Ventilator  


 


8/14/20 08:00 98.8       





 98.8       


 


8/14/20 04:19       40.0 








Comments


ros   unable to obtain  intubated   sedated


on vent, 


sedated


no paradoxical abd motion


no audible wheezing


rrr


no edema


no rash


General:  Mild Distress


Labs





Laboratory Tests








Test


 8/12/20


10:33 8/12/20


12:35 8/12/20


13:36 8/12/20


14:31


 


Glucose (Fingerstick)


 330 mg/dL


(70-99) 274 mg/dL


(70-99) 264 mg/dL


(70-99) 240 mg/dL


(70-99)


 


Test


 8/12/20


15:34 8/12/20


16:32 8/12/20


17:47 8/12/20


18:43


 


Glucose (Fingerstick)


 251 mg/dL


(70-99) 220 mg/dL


(70-99) 198 mg/dL


(70-99) 175 mg/dL


(70-99)


 


Test


 8/12/20


19:46 8/12/20


20:42 8/12/20


21:46 8/12/20


23:15


 


Glucose (Fingerstick)


 172 mg/dL


(70-99) 173 mg/dL


(70-99) 167 mg/dL


(70-99) 158 mg/dL


(70-99)


 


Test


 8/13/20


00:15 8/13/20


01:20 8/13/20


02:19 8/13/20


03:25


 


Glucose (Fingerstick)


 149 mg/dL


(70-99) 142 mg/dL


(70-99) 132 mg/dL


(70-99) 146 mg/dL


(70-99)


 


Test


 8/13/20


04:29 8/13/20


06:00 8/13/20


06:39 8/13/20


08:07


 


Glucose (Fingerstick)


 146 mg/dL


(70-99) 


 166 mg/dL


(70-99) 137 mg/dL


(70-99)


 


Sodium Level


 


 147 mmol/L


(136-145) 


 





 


Potassium Level


 


 4.8 mmol/L


(3.5-5.1) 


 





 


Chloride Level


 


 113 mmol/L


() 


 





 


Carbon Dioxide Level


 


 27 mmol/L


(21-32) 


 





 


Anion Gap  7 (6-14)   


 


Blood Urea Nitrogen


 


 60 mg/dL


(8-26) 


 





 


Creatinine


 


 1.1 mg/dL


(0.7-1.3) 


 





 


Estimated GFR


(Cockcroft-Gault) 


 89.3 


 


 





 


Glucose Level


 


 147 mg/dL


(70-99) 


 





 


Calcium Level


 


 8.1 mg/dL


(8.5-10.1) 


 





 


Test


 8/13/20


08:10 8/13/20


09:55 8/13/20


12:43 8/13/20


15:07


 


O2 Saturation 89 % (92-99)    


 


Arterial Blood pH


 7.42


(7.35-7.45) 


 


 





 


Arterial Blood pCO2 at


Patient Temp 33 mmHg


(35-46) 


 


 





 


Arterial Blood pO2 at Patient


Temp 57 mmHg


() 


 


 





 


Arterial Blood HCO3


 21 mmol/L


(21-28) 


 


 





 


Arterial Blood Base Excess


 -3 mmol/L


(-3-3) 


 


 





 


FiO2 45    


 


Glucose (Fingerstick)


 


 125 mg/dL


(70-99) 117 mg/dL


(70-99) 154 mg/dL


(70-99)


 


Test


 8/13/20


16:24 8/13/20


18:43 8/13/20


20:01 8/13/20


20:59


 


Glucose (Fingerstick)


 138 mg/dL


(70-99) 114 mg/dL


(70-99) 131 mg/dL


(70-99) 159 mg/dL


(70-99)


 


Test


 8/13/20


21:50 8/13/20


23:04 8/14/20


01:09 8/14/20


02:01


 


Glucose (Fingerstick)


 145 mg/dL


(70-99) 149 mg/dL


(70-99) 120 mg/dL


(70-99) 115 mg/dL


(70-99)


 


Test


 8/14/20


03:07 8/14/20


04:02 8/14/20


05:08 8/14/20


06:00


 


Glucose (Fingerstick)


 129 mg/dL


(70-99) 131 mg/dL


(70-99) 164 mg/dL


(70-99) 





 


Sodium Level


 


 


 


 143 mmol/L


(136-145)


 


Potassium Level


 


 


 


 5.2 mmol/L


(3.5-5.1)


 


Chloride Level


 


 


 


 112 mmol/L


()


 


Carbon Dioxide Level


 


 


 


 25 mmol/L


(21-32)


 


Anion Gap    6 (6-14) 


 


Blood Urea Nitrogen


 


 


 


 45 mg/dL


(8-26)


 


Creatinine


 


 


 


 1.1 mg/dL


(0.7-1.3)


 


Estimated GFR


(Cockcroft-Gault) 


 


 


 89.3 





 


Glucose Level


 


 


 


 164 mg/dL


(70-99)


 


Calcium Level


 


 


 


 8.1 mg/dL


(8.5-10.1)


 


Test


 8/14/20


06:04 8/14/20


07:31 8/14/20


08:10 8/14/20


09:07


 


Glucose (Fingerstick)


 148 mg/dL


(70-99) 148 mg/dL


(70-99) 


 148 mg/dL


(70-99)


 


O2 Saturation   93 % (92-99)  


 


Arterial Blood pH


 


 


 7.42


(7.35-7.45) 





 


Arterial Blood pCO2 at


Patient Temp 


 


 29 mmHg


(35-46) 





 


Arterial Blood pO2 at Patient


Temp 


 


 68 mmHg


() 





 


Arterial Blood HCO3


 


 


 18 mmol/L


(21-28) 





 


Arterial Blood Base Excess


 


 


 -5 mmol/L


(-3-3) 





 


FiO2   50%+8  








Laboratory Tests








Test


 8/13/20


09:55 8/13/20


12:43 8/13/20


15:07 8/13/20


16:24


 


Glucose (Fingerstick)


 125 mg/dL


(70-99) 117 mg/dL


(70-99) 154 mg/dL


(70-99) 138 mg/dL


(70-99)


 


Test


 8/13/20


18:43 8/13/20


20:01 8/13/20


20:59 8/13/20


21:50


 


Glucose (Fingerstick)


 114 mg/dL


(70-99) 131 mg/dL


(70-99) 159 mg/dL


(70-99) 145 mg/dL


(70-99)


 


Test


 8/13/20


23:04 8/14/20


01:09 8/14/20


02:01 8/14/20


03:07


 


Glucose (Fingerstick)


 149 mg/dL


(70-99) 120 mg/dL


(70-99) 115 mg/dL


(70-99) 129 mg/dL


(70-99)


 


Test


 8/14/20


04:02 8/14/20


05:08 8/14/20


06:00 8/14/20


06:04


 


Glucose (Fingerstick)


 131 mg/dL


(70-99) 164 mg/dL


(70-99) 


 148 mg/dL


(70-99)


 


Sodium Level


 


 


 143 mmol/L


(136-145) 





 


Potassium Level


 


 


 5.2 mmol/L


(3.5-5.1) 





 


Chloride Level


 


 


 112 mmol/L


() 





 


Carbon Dioxide Level


 


 


 25 mmol/L


(21-32) 





 


Anion Gap   6 (6-14)  


 


Blood Urea Nitrogen


 


 


 45 mg/dL


(8-26) 





 


Creatinine


 


 


 1.1 mg/dL


(0.7-1.3) 





 


Estimated GFR


(Cockcroft-Gault) 


 


 89.3 


 





 


Glucose Level


 


 


 164 mg/dL


(70-99) 





 


Calcium Level


 


 


 8.1 mg/dL


(8.5-10.1) 





 


Test


 8/14/20


07:31 8/14/20


08:10 8/14/20


09:07 





 


Glucose (Fingerstick)


 148 mg/dL


(70-99) 


 148 mg/dL


(70-99) 





 


O2 Saturation  93 % (92-99)   


 


Arterial Blood pH


 


 7.42


(7.35-7.45) 


 





 


Arterial Blood pCO2 at


Patient Temp 


 29 mmHg


(35-46) 


 





 


Arterial Blood pO2 at Patient


Temp 


 68 mmHg


() 


 





 


Arterial Blood HCO3


 


 18 mmol/L


(21-28) 


 





 


Arterial Blood Base Excess


 


 -5 mmol/L


(-3-3) 


 





 


FiO2  50%+8   








Medications





Active Scripts








 Medications  Dose


 Route/Sig


 Max Daily Dose Days Date Category


 


 Levemir (Insulin


 Detemir) 100


 Unit/1 Ml Vial  65 Unit


 SQ BID


   8/4/20 Reported


 


 Novolog (Insulin


 Aspart) 100


 Unit/1 Ml Vial  34 Unit


 SQ TID


   8/4/20 Reported


 


 Hydrochlorothiazide


 Tablet


  (Hydrochlorothiazide)


 12.5 Mg Tablet  12.5 Mg


 PO DAILY


   3/15/20 Rx








Comments


cxr 8/7 reviewed   b lat infilt l>r   ett ok





CT chest reviewed 


 


1.  Essentially nondiagnostic study for evaluation of pulmonary embolism 


due to contrast bolus timing. If persistent clinical concern, repeat CT 


angiogram or VQ scan can better assess.


2.  Multifocal ground glass opacities bilaterally predominantly within the


left upper and lower lobes, concerning for infection such as viral 


pneumonia, ARDS or asymmetric pulmonary edema. Recommend follow-up to 


ensure resolution.





Impression


.


1.  Acute hypoxic respiratory failure secondary to COVID-19 pneumonia and ALI.--

 intubated 8/7


2.  Abnormal CT chest consistent with pneumonia with ground glass opacities


bilaterally, typical finding with COVID-19 pneumonia


3.  Underlying morbid obesity. ? sherrell


4.  Diabetes.


5.  Hypertension.


6.  Pre-renal azotemia , likely contributed by steroid effect.


7. Hypertension-- improved 


8. fever-resolved 


9. morbid obesity





Plan


.


cont vent support, setting reviewed, titrate fio2 to keep sat 92-94%, ABG 

reviewed, Fi02 50% and PEEP 8,Avoid high PEEP unless needed. will obtain CXR 

today 


abx per ID, 


Continue IV steroids 


Monitor renal function 


s/p convalescent plasma, remdesivir, follow clinical course 


HTN per PCP-- off cardene gtt


DM per PCP -- on insulin gtt


enteral nutrition, tolerating well


DVT/GI PPX 


Discussed with RN and RT








remains critically ill 


cct 30 min wo overlap











AZAM REAGAN MD                 Aug 14, 2020 09:41

## 2020-08-14 NOTE — RAD
Single view of the chest. 8/14/2020 9:19 AM

 

Indication: Reason: intubated, covid + / Spl. Instructions:  / History: 

 

Comparison: Single view of the chest, yesterday

 

Findings: Support lines and tubes including endotracheal tube, enteric 

tube, and right internal jugular central line appear grossly stable in 

position. Low lung volumes persist. Interval increase in dense infiltrate 

within the medial basilar right lung. Interstitial and patchy alveolar 

infiltrates throughout the bilateral lungs appear otherwise stable. Heart 

appears mildly enlarged, but is likely accentuated by low lung volumes and

portable technique.

 

IMPRESSION:

1. Stable support lines and tubes

 

2. Increased right medial basilar consolidation is interim possibly 

relating to superimposed bronchopneumonia and/or atelectasis

 

3. Diffuse interstitial and patchy alveolar infiltrates  

 

Electronically signed by: Marcelino Calvin MD (8/14/2020 11:57 AM) [id number]

## 2020-08-15 NOTE — PDOC
Infectious Disease Note


Subjective


Subjective


Pt remains intubated/sedated


FiO2 at 50% PEEP 8 


No fevers for 96 hrs





ROS


ROS


unobtainable due to patient's condition





Vital Sign


Vital Signs





Vital Signs








  Date Time  Temp Pulse Resp B/P (MAP) Pulse Ox O2 Delivery O2 Flow Rate FiO2


 


8/15/20 08:29  96  174/96    


 


8/15/20 08:19     96 Ventilator  


 


8/15/20 06:25   28     


 


8/15/20 06:00 99.4       





 99.4       


 


8/15/20 04:00       40.0 











Physical Exam


PHYSICAL EXAM


GENERAL: Propped up in bed, orally intubated and sedated. + mitts 


HEENT:  Pupils equal. ETT and OGT in place 


NECK:  Supple


LUNGS:  Diminished aeration in bases 


HEART:  S1, S2 regular.


ABDOMEN:  Obese, bowel sounds present, soft


: Hopkins in place 


EXTREMITIES:  Trace edema, no cyanosis. SCDs bilaterally. 


SKIN:  No signs of rash. 


NEUROLOGIC:  Sedated


RIJ (8/7) without signs of complications





Labs


Lab





Laboratory Tests








Test


 8/14/20


10:17 8/14/20


11:24 8/14/20


13:37 8/14/20


15:59


 


Glucose (Fingerstick)


 135 mg/dL


(70-99) 144 mg/dL


(70-99) 180 mg/dL


(70-99) 138 mg/dL


(70-99)


 


Test


 8/14/20


17:07 8/14/20


18:17 8/14/20


19:24 8/14/20


20:28


 


Glucose (Fingerstick)


 131 mg/dL


(70-99) 132 mg/dL


(70-99) 152 mg/dL


(70-99) 154 mg/dL


(70-99)


 


Test


 8/14/20


22:36 8/15/20


00:39 8/15/20


01:43 8/15/20


02:48


 


Glucose (Fingerstick)


 134 mg/dL


(70-99) 113 mg/dL


(70-99) 161 mg/dL


(70-99) 134 mg/dL


(70-99)


 


Test


 8/15/20


03:56 8/15/20


05:06 8/15/20


05:20 8/15/20


06:16


 


Glucose (Fingerstick)


 127 mg/dL


(70-99) 123 mg/dL


(70-99) 


 128 mg/dL


(70-99)


 


White Blood Count


 


 


 22.0 x10^3/uL


(4.0-11.0) 





 


Red Blood Count


 


 


 4.85 x10^6/uL


(4.30-5.70) 





 


Hemoglobin


 


 


 12.8 g/dL


(13.0-17.5) 





 


Hematocrit


 


 


 39.8 %


(39.0-53.0) 





 


Mean Corpuscular Volume   82 fL ()  


 


Mean Corpuscular Hemoglobin   26 pg (25-35)  


 


Mean Corpuscular Hemoglobin


Concent 


 


 32 g/dL


(31-37) 





 


Red Cell Distribution Width


 


 


 15.2 %


(11.5-14.5) 





 


Platelet Count


 


 


 362 x10^3/uL


(140-400) 





 


Neutrophils (%) (Auto)   87 % (31-73)  


 


Lymphocytes (%) (Auto)   6 % (24-48)  


 


Monocytes (%) (Auto)   8 % (0-9)  


 


Eosinophils (%) (Auto)   0 % (0-3)  


 


Basophils (%) (Auto)   0 % (0-3)  


 


Neutrophils # (Auto)


 


 


 19.0 x10^3/uL


(1.8-7.7) 





 


Lymphocytes # (Auto)


 


 


 1.3 x10^3/uL


(1.0-4.8) 





 


Monocytes # (Auto)


 


 


 1.7 x10^3/uL


(0.0-1.1) 





 


Eosinophils # (Auto)


 


 


 0.0 x10^3/uL


(0.0-0.7) 





 


Basophils # (Auto)


 


 


 0.0 x10^3/uL


(0.0-0.2) 





 


Sodium Level


 


 


 143 mmol/L


(136-145) 





 


Potassium Level


 


 


 5.3 mmol/L


(3.5-5.1) 





 


Chloride Level


 


 


 110 mmol/L


() 





 


Carbon Dioxide Level


 


 


 27 mmol/L


(21-32) 





 


Anion Gap   6 (6-14)  


 


Blood Urea Nitrogen


 


 


 41 mg/dL


(8-26) 





 


Creatinine


 


 


 1.1 mg/dL


(0.7-1.3) 





 


Estimated GFR


(Cockcroft-Gault) 


 


 89.3 


 





 


Glucose Level


 


 


 122 mg/dL


(70-99) 





 


Calcium Level


 


 


 7.9 mg/dL


(8.5-10.1) 





 


Magnesium Level


 


 


 2.3 mg/dL


(1.8-2.4) 





 


Test


 8/15/20


07:52 8/15/20


08:51 


 





 


Glucose (Fingerstick)


 120 mg/dL


(70-99) 128 mg/dL


(70-99) 


 











Micro





Microbiology


8/9/20 Blood Culture - Final, Complete


         NO GROWTH AFTER 5 DAYS





Objective


Assessment


Fever resolved


COVID-19 positive, 7/29. s/p convalescent plasma and remdesivir,


Bilateral pulmonary infiltrates.


Leukocytosis, on steroids, increased ,could be reactive


Allergy to Zosyn. h/o hives and swelling. 


Acute respiratory failure s/p intubation, 8/7


Renal insufficiency  


Diabetes.  Poorly controlled


Hypertension.


Obesity.


Constipation





Plan


Plan of Care


Cont Merrem 8/9 - wean off soon 


s/p convalescent plasma and remdesivir 


Steroids per primary 


Maintain aspiration precautions


Airborne isolation for COVID-19


Discussed with nursing





Critically ill











 Attending Co-Sign 


The patient was seen and   examined at the bedside. The chart was reviewed. The 

case was discussed. Agree with the plan of care.











MOLLY ARECHIGA        Aug 15, 2020 09:25


SATNAM SALINAS MD           Aug 15, 2020 13:07

## 2020-08-15 NOTE — PDOC
PULMONARY PROGRESS NOTES


DATE: 8/15/20 


TIME: 06:52


Subjective


intubated 8/7, on vent, mod secretion, peep 8, 50% fi02


sedated on versed, fentanyl, precedex, low grade fever


on insulin gtt, 


no overnight concerns from nursing


Vitals





Vital Signs








  Date Time  Temp Pulse Resp B/P (MAP) Pulse Ox O2 Delivery O2 Flow Rate FiO2


 


8/15/20 06:25   28   Ventilator  


 


8/15/20 06:00 99.4 74  169/91 (117) 97   





 99.4       


 


8/15/20 04:00       40.0 








Comments


ros   unable to obtain  intubated   sedated


on vent, 


sedated


NC AT 


no paradoxical abd motion


no accessory muscle use 


rrr


no edema


no rash


Labs





Laboratory Tests








Test


 8/13/20


08:07 8/13/20


08:10 8/13/20


09:55 8/13/20


12:43


 


Glucose (Fingerstick)


 137 mg/dL


(70-99) 


 125 mg/dL


(70-99) 117 mg/dL


(70-99)


 


O2 Saturation  89 % (92-99)   


 


Arterial Blood pH


 


 7.42


(7.35-7.45) 


 





 


Arterial Blood pCO2 at


Patient Temp 


 33 mmHg


(35-46) 


 





 


Arterial Blood pO2 at Patient


Temp 


 57 mmHg


() 


 





 


Arterial Blood HCO3


 


 21 mmol/L


(21-28) 


 





 


Arterial Blood Base Excess


 


 -3 mmol/L


(-3-3) 


 





 


FiO2  45   


 


Test


 8/13/20


15:07 8/13/20


16:24 8/13/20


18:43 8/13/20


20:01


 


Glucose (Fingerstick)


 154 mg/dL


(70-99) 138 mg/dL


(70-99) 114 mg/dL


(70-99) 131 mg/dL


(70-99)


 


Test


 8/13/20


20:59 8/13/20


21:50 8/13/20


23:04 8/14/20


01:09


 


Glucose (Fingerstick)


 159 mg/dL


(70-99) 145 mg/dL


(70-99) 149 mg/dL


(70-99) 120 mg/dL


(70-99)


 


Test


 8/14/20


02:01 8/14/20


03:07 8/14/20


04:02 8/14/20


05:08


 


Glucose (Fingerstick)


 115 mg/dL


(70-99) 129 mg/dL


(70-99) 131 mg/dL


(70-99) 164 mg/dL


(70-99)


 


Test


 8/14/20


06:00 8/14/20


06:04 8/14/20


07:31 8/14/20


08:10


 


Sodium Level


 143 mmol/L


(136-145) 


 


 





 


Potassium Level


 5.2 mmol/L


(3.5-5.1) 


 


 





 


Chloride Level


 112 mmol/L


() 


 


 





 


Carbon Dioxide Level


 25 mmol/L


(21-32) 


 


 





 


Anion Gap 6 (6-14)    


 


Blood Urea Nitrogen


 45 mg/dL


(8-26) 


 


 





 


Creatinine


 1.1 mg/dL


(0.7-1.3) 


 


 





 


Estimated GFR


(Cockcroft-Gault) 89.3 


 


 


 





 


Glucose Level


 164 mg/dL


(70-99) 


 


 





 


Calcium Level


 8.1 mg/dL


(8.5-10.1) 


 


 





 


Glucose (Fingerstick)


 


 148 mg/dL


(70-99) 148 mg/dL


(70-99) 





 


O2 Saturation    93 % (92-99) 


 


Arterial Blood pH


 


 


 


 7.42


(7.35-7.45)


 


Arterial Blood pCO2 at


Patient Temp 


 


 


 29 mmHg


(35-46)


 


Arterial Blood pO2 at Patient


Temp 


 


 


 68 mmHg


()


 


Arterial Blood HCO3


 


 


 


 18 mmol/L


(21-28)


 


Arterial Blood Base Excess


 


 


 


 -5 mmol/L


(-3-3)


 


FiO2    50%+8 


 


Test


 8/14/20


09:07 8/14/20


10:17 8/14/20


11:24 8/14/20


13:37


 


Glucose (Fingerstick)


 148 mg/dL


(70-99) 135 mg/dL


(70-99) 144 mg/dL


(70-99) 180 mg/dL


(70-99)


 


Test


 8/14/20


15:59 8/14/20


17:07 8/14/20


18:17 8/14/20


19:24


 


Glucose (Fingerstick)


 138 mg/dL


(70-99) 131 mg/dL


(70-99) 132 mg/dL


(70-99) 152 mg/dL


(70-99)


 


Test


 8/14/20


20:28 8/14/20


22:36 8/15/20


00:39 8/15/20


01:43


 


Glucose (Fingerstick)


 154 mg/dL


(70-99) 134 mg/dL


(70-99) 113 mg/dL


(70-99) 161 mg/dL


(70-99)


 


Test


 8/15/20


02:48 8/15/20


03:56 8/15/20


05:06 8/15/20


05:20


 


Glucose (Fingerstick)


 134 mg/dL


(70-99) 127 mg/dL


(70-99) 123 mg/dL


(70-99) 





 


White Blood Count


 


 


 


 22.0 x10^3/uL


(4.0-11.0)


 


Red Blood Count


 


 


 


 4.85 x10^6/uL


(4.30-5.70)


 


Hemoglobin


 


 


 


 12.8 g/dL


(13.0-17.5)


 


Hematocrit


 


 


 


 39.8 %


(39.0-53.0)


 


Mean Corpuscular Volume    82 fL () 


 


Mean Corpuscular Hemoglobin    26 pg (25-35) 


 


Mean Corpuscular Hemoglobin


Concent 


 


 


 32 g/dL


(31-37)


 


Red Cell Distribution Width


 


 


 


 15.2 %


(11.5-14.5)


 


Platelet Count


 


 


 


 362 x10^3/uL


(140-400)


 


Neutrophils (%) (Auto)    87 % (31-73) 


 


Lymphocytes (%) (Auto)    6 % (24-48) 


 


Monocytes (%) (Auto)    8 % (0-9) 


 


Eosinophils (%) (Auto)    0 % (0-3) 


 


Basophils (%) (Auto)    0 % (0-3) 


 


Neutrophils # (Auto)


 


 


 


 19.0 x10^3/uL


(1.8-7.7)


 


Lymphocytes # (Auto)


 


 


 


 1.3 x10^3/uL


(1.0-4.8)


 


Monocytes # (Auto)


 


 


 


 1.7 x10^3/uL


(0.0-1.1)


 


Eosinophils # (Auto)


 


 


 


 0.0 x10^3/uL


(0.0-0.7)


 


Basophils # (Auto)


 


 


 


 0.0 x10^3/uL


(0.0-0.2)


 


Sodium Level


 


 


 


 143 mmol/L


(136-145)


 


Potassium Level


 


 


 


 5.3 mmol/L


(3.5-5.1)


 


Chloride Level


 


 


 


 110 mmol/L


()


 


Carbon Dioxide Level


 


 


 


 27 mmol/L


(21-32)


 


Anion Gap    6 (6-14) 


 


Blood Urea Nitrogen


 


 


 


 41 mg/dL


(8-26)


 


Creatinine


 


 


 


 1.1 mg/dL


(0.7-1.3)


 


Estimated GFR


(Cockcroft-Gault) 


 


 


 89.3 





 


Glucose Level


 


 


 


 122 mg/dL


(70-99)


 


Calcium Level


 


 


 


 7.9 mg/dL


(8.5-10.1)


 


Magnesium Level


 


 


 


 2.3 mg/dL


(1.8-2.4)


 


Test


 8/15/20


06:16 


 


 





 


Glucose (Fingerstick)


 128 mg/dL


(70-99) 


 


 











Laboratory Tests








Test


 8/14/20


07:31 8/14/20


08:10 8/14/20


09:07 8/14/20


10:17


 


Glucose (Fingerstick)


 148 mg/dL


(70-99) 


 148 mg/dL


(70-99) 135 mg/dL


(70-99)


 


O2 Saturation  93 % (92-99)   


 


Arterial Blood pH


 


 7.42


(7.35-7.45) 


 





 


Arterial Blood pCO2 at


Patient Temp 


 29 mmHg


(35-46) 


 





 


Arterial Blood pO2 at Patient


Temp 


 68 mmHg


() 


 





 


Arterial Blood HCO3


 


 18 mmol/L


(21-28) 


 





 


Arterial Blood Base Excess


 


 -5 mmol/L


(-3-3) 


 





 


FiO2  50%+8   


 


Test


 8/14/20


11:24 8/14/20


13:37 8/14/20


15:59 8/14/20


17:07


 


Glucose (Fingerstick)


 144 mg/dL


(70-99) 180 mg/dL


(70-99) 138 mg/dL


(70-99) 131 mg/dL


(70-99)


 


Test


 8/14/20


18:17 8/14/20


19:24 8/14/20


20:28 8/14/20


22:36


 


Glucose (Fingerstick)


 132 mg/dL


(70-99) 152 mg/dL


(70-99) 154 mg/dL


(70-99) 134 mg/dL


(70-99)


 


Test


 8/15/20


00:39 8/15/20


01:43 8/15/20


02:48 8/15/20


03:56


 


Glucose (Fingerstick)


 113 mg/dL


(70-99) 161 mg/dL


(70-99) 134 mg/dL


(70-99) 127 mg/dL


(70-99)


 


Test


 8/15/20


05:06 8/15/20


05:20 8/15/20


06:16 





 


Glucose (Fingerstick)


 123 mg/dL


(70-99) 


 128 mg/dL


(70-99) 





 


White Blood Count


 


 22.0 x10^3/uL


(4.0-11.0) 


 





 


Red Blood Count


 


 4.85 x10^6/uL


(4.30-5.70) 


 





 


Hemoglobin


 


 12.8 g/dL


(13.0-17.5) 


 





 


Hematocrit


 


 39.8 %


(39.0-53.0) 


 





 


Mean Corpuscular Volume  82 fL ()   


 


Mean Corpuscular Hemoglobin  26 pg (25-35)   


 


Mean Corpuscular Hemoglobin


Concent 


 32 g/dL


(31-37) 


 





 


Red Cell Distribution Width


 


 15.2 %


(11.5-14.5) 


 





 


Platelet Count


 


 362 x10^3/uL


(140-400) 


 





 


Neutrophils (%) (Auto)  87 % (31-73)   


 


Lymphocytes (%) (Auto)  6 % (24-48)   


 


Monocytes (%) (Auto)  8 % (0-9)   


 


Eosinophils (%) (Auto)  0 % (0-3)   


 


Basophils (%) (Auto)  0 % (0-3)   


 


Neutrophils # (Auto)


 


 19.0 x10^3/uL


(1.8-7.7) 


 





 


Lymphocytes # (Auto)


 


 1.3 x10^3/uL


(1.0-4.8) 


 





 


Monocytes # (Auto)


 


 1.7 x10^3/uL


(0.0-1.1) 


 





 


Eosinophils # (Auto)


 


 0.0 x10^3/uL


(0.0-0.7) 


 





 


Basophils # (Auto)


 


 0.0 x10^3/uL


(0.0-0.2) 


 





 


Sodium Level


 


 143 mmol/L


(136-145) 


 





 


Potassium Level


 


 5.3 mmol/L


(3.5-5.1) 


 





 


Chloride Level


 


 110 mmol/L


() 


 





 


Carbon Dioxide Level


 


 27 mmol/L


(21-32) 


 





 


Anion Gap  6 (6-14)   


 


Blood Urea Nitrogen


 


 41 mg/dL


(8-26) 


 





 


Creatinine


 


 1.1 mg/dL


(0.7-1.3) 


 





 


Estimated GFR


(Cockcroft-Gault) 


 89.3 


 


 





 


Glucose Level


 


 122 mg/dL


(70-99) 


 





 


Calcium Level


 


 7.9 mg/dL


(8.5-10.1) 


 





 


Magnesium Level


 


 2.3 mg/dL


(1.8-2.4) 


 











Medications





Active Scripts








 Medications  Dose


 Route/Sig


 Max Daily Dose Days Date Category


 


 Levemir (Insulin


 Detemir) 100


 Unit/1 Ml Vial  65 Unit


 SQ BID


   8/4/20 Reported


 


 Novolog (Insulin


 Aspart) 100


 Unit/1 Ml Vial  34 Unit


 SQ TID


   8/4/20 Reported


 


 Hydrochlorothiazide


 Tablet


  (Hydrochlorothiazide)


 12.5 Mg Tablet  12.5 Mg


 PO DAILY


   3/15/20 Rx








Comments


cxr 8/7 reviewed   b lat infilt l>r   ett ok





CT chest reviewed 


 


1.  Essentially nondiagnostic study for evaluation of pulmonary embolism 


due to contrast bolus timing. If persistent clinical concern, repeat CT 


angiogram or VQ scan can better assess.


2.  Multifocal ground glass opacities bilaterally predominantly within the


left upper and lower lobes, concerning for infection such as viral 


pneumonia, ARDS or asymmetric pulmonary edema. Recommend follow-up to 


ensure resolution.





Impression


.


1.  Acute hypoxic respiratory failure secondary to COVID-19 pneumonia and ALI.--

 intubated 8/7


2.  Abnormal CT chest consistent with pneumonia with ground glass opacities


bilaterally, typical finding with COVID-19 pneumonia


3.  Underlying morbid obesity. ? sherrell


4.  Diabetes.


5.  Hypertension.


6.  MIAN, Pre-renal azotemia , likely contributed by steroid effect.


7. Hypertension-- improved 


8. fever


9. morbid obesity, prob sherrell





Plan


.


cont vent support, setting reviewed, titrate fio2 to keep sat 92-94%, ABG 

reviewed, Fi02 50% and PEEP 8 


abx per ID, 


Continue IV steroids 


Monitor renal function 


s/p convalescent plasma, remdesivir, follow clinical course 


HTN per PCP-- off cardene gtt


DM per PCP -- on insulin gtt


enteral nutrition, tolerating well


DVT/GI PPX 


Discussed with RN and RT








remains critically ill 


cct 30 min wo overlap











AIDA PATTON MD               Aug 15, 2020 06:55

## 2020-08-15 NOTE — PDOC
[facility] PROGRESS NOTE


Date of Service


DOS:


DATE: 8/15/20 


TIME: 11:59





Chief Complaint


Chief Complaint


Acute hypoxic respiratory failure secondary to COVID-19 pneumonia. s/p 

intubation, 8/7


Bilateral pulmonary infiltrates - Abnormal CT chest consistent with pneumonia 

with ground glass opacities bilaterally, poor contrast, pulmonary embolism could

not be ruled out, but he has low clinical suspicion for pulmonary embolism and 

as such no further investigation is needed.


Underlying morbid obesity.


Diabetes.


Hypertension.


Sepsis - Fever and leukocytosis. Allergy to Zosyn.





History of Present Illness


History of Present Illness


8/15/2020


Patient seen and examined in the COVID-19 ICU


He remains intubated on assist control 26 650% FiO2 with 8 of PEEP


Discussed with RN


Chart reviewed


Patient has Hopkins to bedside drainage and mitts for patient safety


Still very critically ill








8/14/2020


Patient is seen and examined in ICU


Patient is intubated, on vent AC/26/600/50% FiO2 with 8 PEEP and sedated on 

Precedex, Versed and Fentanyl


Hopkins BSD


Discussed with RN


Charts reviewed





8/13/2020


Patient is seen and examined in ICU


Patient is intubated, on vent AC/26/600/50% FiO2 with 8 PEEP and sedated on 

Precedex, Versed and Fentanyl


Glycemic control obtained


Discussed with RN


Charts reviewed





8/12/2020


Patient is seen and examined in ICU


Patient is intubated, on vent AC/26/600/45% FiO2 with 8 PEEP and sedated on 

Precedex, Versed and Fentanyl


OG Tube at 70cc/hr


Discussed with RN


Charts reviewed








08/11/2020


Patient is seen and examined in ICU


Patient is intubated, on vent AC/26/600/70% FiO2 and sedated on Precedex, Versed

and Fentanyl


Discussed with RN


Charts reviewed





08/10/2020


Patient is seen and examined in ICU


Patient is intubated, on vent AC/26/600/55% FiO2 and sedated on Precedex, Versed

and Fentanyl


Received plasma and Remdesivir


Patient had some fever


Discussed with RN


Charts reviewed





Mr Dior is a 41-year-old morbidly obese male with a BMI of 51, DM2, HTN, 

diabetic foot ulcers who was brought into the hospital complaining of shortness 

of breath for 5 days prior to admit.  He was tested positive for SARS-CoV-2 

(COVID 19).  He had a cough, which has been nonproductive.  No headaches, no 

nausea, vomiting, no diarrhea, no dysuria, no focal weakness. Required 2L NCO2 

to maintain O2 saturations > 88%. He had CTA chest nondiagnostic for pulmonary 

embolism, but with multifocal ground glass opacities bilaterally predominantly 

in the left upper and lower lobes and concerning for COVID pneumonia. His T-max 

is 100.2.  Pulm and ID consulted, admitted for further care.





8/5: On 4L NCO2, asking to leave the hospital.


8/6: Resting on 15L NCO2, trying to take off O2. Transitioned to 35% vapotherm


8/7: Transferred to ICU for Vapotherm. ABG 63 on 90%.  Glucose in the 300s 

despite high dosing of insulin. No abdominal pain. S/p convalescent FFP


8/8: Afebrile. Seen on vent. He was intubated 8/7, on vent, sedated on versed, 

fentanyl, precedex, small ett secretion, on peep 10, fio2 60%.  Had a right IJ 

placed by anesthesia had bleeding for 15 to 20 minutes after placement.  Labs 

with WBC 15.7, Hb 12, platelets 255, , K5, BUN 58, CR 1.6, glucose 132, 

phosphorus 5.7.





Febrile 101.1 F.  Seen on vent AC mode tidal volume 600 FiO2 40%, PEEP of 9.  

Glucose trending downward.  Started on tube feeds.  Still with good urine 

output.





Plan:


Add Carbapenem per ID. +/- zyvox


Cont ICU, critically ill





Vitals/I&O


Vitals/I&O:





                                   Vital Signs








  Date Time  Temp Pulse Resp B/P (MAP) Pulse Ox O2 Delivery O2 Flow Rate FiO2


 


8/15/20 11:49     96 Ventilator  


 


8/15/20 11:40  82  190/90    


 


8/15/20 11:00   25     


 


8/15/20 08:00 99.0       





 99.0       


 


8/15/20 04:00       40.0 














                                    I & O   


 


 8/14/20 8/14/20 8/15/20





 15:00 23:00 07:00


 


Intake Total 1765 ml 2389.2 ml 2495 ml


 


Output Total 3500 ml 1175 ml 875 ml


 


Balance -1735 ml 1214.2 ml 1620 ml











Physical Exam


Physical Exam:


GENERAL: Propped up in bed, orally intubated and sedated. + mitts 


HEENT:  Pupils equal. ETT and OGT in place 


NECK:  Supple


LUNGS:  Diminished aeration in bases 


HEART:  S1, S2 regular.


ABDOMEN:  Obese, bowel sounds present, soft


: Hopkins in place 


EXTREMITIES:  Trace edema, no cyanosis. SCDs bilaterally. 


SKIN:  No signs of rash. 


NEUROLOGIC:  Sedated


RIJ (8/7) without signs of complications


General:  Other (intubated and sedated)


Heart:  Regular rate, Normal S1, Normal S2, No murmurs


Abdomen:  Normal bowel sounds, No masses


Extremities:  Normal pulses, No tenderness/swelling


Skin:  No significant lesion





Labs


Labs:





Laboratory Tests








Test


 8/14/20


13:37 8/14/20


15:59 8/14/20


17:07 8/14/20


18:17


 


Glucose (Fingerstick)


 180 mg/dL


(70-99) 138 mg/dL


(70-99) 131 mg/dL


(70-99) 132 mg/dL


(70-99)


 


Test


 8/14/20


19:24 8/14/20


20:28 8/14/20


22:36 8/15/20


00:39


 


Glucose (Fingerstick)


 152 mg/dL


(70-99) 154 mg/dL


(70-99) 134 mg/dL


(70-99) 113 mg/dL


(70-99)


 


Test


 8/15/20


01:43 8/15/20


02:48 8/15/20


03:56 8/15/20


05:06


 


Glucose (Fingerstick)


 161 mg/dL


(70-99) 134 mg/dL


(70-99) 127 mg/dL


(70-99) 123 mg/dL


(70-99)


 


Test


 8/15/20


05:20 8/15/20


06:16 8/15/20


07:52 8/15/20


08:00


 


White Blood Count


 22.0 x10^3/uL


(4.0-11.0) 


 


 





 


Red Blood Count


 4.85 x10^6/uL


(4.30-5.70) 


 


 





 


Hemoglobin


 12.8 g/dL


(13.0-17.5) 


 


 





 


Hematocrit


 39.8 %


(39.0-53.0) 


 


 





 


Mean Corpuscular Volume 82 fL ()    


 


Mean Corpuscular Hemoglobin 26 pg (25-35)    


 


Mean Corpuscular Hemoglobin


Concent 32 g/dL


(31-37) 


 


 





 


Red Cell Distribution Width


 15.2 %


(11.5-14.5) 


 


 





 


Platelet Count


 362 x10^3/uL


(140-400) 


 


 





 


Neutrophils (%) (Auto) 87 % (31-73)    


 


Lymphocytes (%) (Auto) 6 % (24-48)    


 


Monocytes (%) (Auto) 8 % (0-9)    


 


Eosinophils (%) (Auto) 0 % (0-3)    


 


Basophils (%) (Auto) 0 % (0-3)    


 


Neutrophils # (Auto)


 19.0 x10^3/uL


(1.8-7.7) 


 


 





 


Lymphocytes # (Auto)


 1.3 x10^3/uL


(1.0-4.8) 


 


 





 


Monocytes # (Auto)


 1.7 x10^3/uL


(0.0-1.1) 


 


 





 


Eosinophils # (Auto)


 0.0 x10^3/uL


(0.0-0.7) 


 


 





 


Basophils # (Auto)


 0.0 x10^3/uL


(0.0-0.2) 


 


 





 


Sodium Level


 143 mmol/L


(136-145) 


 


 





 


Potassium Level


 5.3 mmol/L


(3.5-5.1) 


 


 





 


Chloride Level


 110 mmol/L


() 


 


 





 


Carbon Dioxide Level


 27 mmol/L


(21-32) 


 


 





 


Anion Gap 6 (6-14)    


 


Blood Urea Nitrogen


 41 mg/dL


(8-26) 


 


 





 


Creatinine


 1.1 mg/dL


(0.7-1.3) 


 


 





 


Estimated GFR


(Cockcroft-Gault) 89.3 


 


 


 





 


Glucose Level


 122 mg/dL


(70-99) 


 


 





 


Calcium Level


 7.9 mg/dL


(8.5-10.1) 


 


 





 


Magnesium Level


 2.3 mg/dL


(1.8-2.4) 


 


 





 


Glucose (Fingerstick)


 


 128 mg/dL


(70-99) 120 mg/dL


(70-99) 





 


O2 Saturation    93 % (92-99) 


 


Arterial Blood pH


 


 


 


 7.42


(7.35-7.45)


 


Arterial Blood pH (Temp


corrected) 


 


 


 7.41 





 


Arterial Blood pCO2 at


Patient Temp 


 


 


 35 mmHg


(35-46)


 


Arterial Blood pCO2 (Temp


correct) 


 


 


 36 mmHg 





 


Arterial Blood pO2 at Patient


Temp 


 


 


 67 mmHg


()


 


Arterial Blood pO2 (Temp


corrected) 


 


 


 68 mmHg 





 


Arterial Blood HCO3


 


 


 


 22 mmol/L


(21-28)


 


Arterial Blood Base Excess


 


 


 


 -2 mmol/L


(-3-3)


 


FiO2    50 


 


Test


 8/15/20


08:51 8/15/20


09:59 8/15/20


11:14 





 


Glucose (Fingerstick)


 128 mg/dL


(70-99) 147 mg/dL


(70-99) 139 mg/dL


(70-99) 














Assessment and Plan


Assessmemt and Plan


Problems


Medical Problems:


(1) COVID-19 virus detected


Status: Acute  





Acute hypoxic respiratory failure secondary to COVID-19 pneumonia. s/p 

intubation, 8/7


Bilateral pulmonary infiltrates - Abnormal CT chest consistent with pneumonia 

with ground glass opacities bilaterally, poor contrast, pulmonary embolism could

not be ruled out, but he has low clinical suspicion for pulmonary embolism and 

as such no further investigation is needed.


Underlying morbid obesity.


Diabetes.


Hypertension.


Sepsis - Fever and leukocytosis. Allergy to Zosyn.





Plan


Vent weaning


Precedex fentanyl and propofol


Home meds


OG feeds


DVT prophylaxis


He received a full Covid-19 protocol including steroids antibiotics and plasma


ICU monitoring


Prognosis guarded


He is critically ill


Total time 34 minutes





Comment


Review of Relevant


I have reviewed the following items joseph (where applicable) has been applied.


Medications:





Current Medications








 Medications


  (Trade)  Dose


 Ordered  Sig/Toni


 Route


 PRN Reason  Start Time


 Stop Time Status Last Admin


Dose Admin


 


 Enalaprilat


  (Vasotec Inj)  2.5 mg  Q6HRS


 IVP


   8/15/20 12:00


    8/15/20 11:40





 


 Metoclopramide HCl


  (Reglan Vial)  10 mg  PRN Q8HRS  PRN


 IVP


 NAUSEA/VOMITING  8/15/20 11:45


    8/15/20 11:41














Justicifation of Admission Dx:


Justifications for Admission:


Justification of Admission Dx:  Yes


Respiratory Failure:  Non-Cardiac Pulm Edema











JAIRO BUSCH III DO           Aug 15, 2020 12:01

## 2020-08-16 NOTE — NUR
Per Dr. Kelly, stop vasotec and start patient on hydralazine 10 mg Q12H for elevated BP. 
Stop VitalAF and begin Nepro @ 35 mL/hr to help resolve hyperkalemia and abdominal 
distension

## 2020-08-16 NOTE — NUR
Patient not in sync with the ventilator regardless of bolusing sedation medication. Patient 
overbreathing ventilator and exhibiting abdominal breathing. PRN Vec given in attempt to get 
patient back in sync with ventilator.

## 2020-08-16 NOTE — RAD
Abdomen AP portable at 1427:

 

Reason for examination: Abdominal distention. No bowel movement.

 

Comparison is made to previous study dated 8/7/2020.

 

NG tube is present with the tip and side port in the stomach. There is no 

gross organomegaly. Psoas muscles appear symmetric. There is gaseous 

distention of the colon with moderate amount of fecal material in the 

right colon. No abnormal calcifications are seen.

 

IMPRESSION:

 

Gaseous distention of the colon with a moderate amount of fecal material 

in the right colon.

 

Electronically signed by: Pamela Chen MD (8/16/2020 3:33 PM) [facility]INT[facility]

## 2020-08-16 NOTE — PDOC
PULMONARY PROGRESS NOTES


DATE: 8/16/20 


TIME: 11:34


Subjective


intubated 8/7, on vent, small secretion, peep 8, 50% fi02


sedated on versed, fentanyl, precedex, low grade fever


on insulin gtt, 


no overnight concerns from nursing


Vitals





Vital Signs








  Date Time  Temp Pulse Resp B/P (MAP) Pulse Ox O2 Delivery O2 Flow Rate FiO2


 


8/16/20 11:00  78 25 118/66 (83) 98 Ventilator  


 


8/16/20 08:00 98.4       





 98.4       


 


8/16/20 06:47       40.0 








Comments


ros   unable to obtain  intubated   sedated


on vent, 


sedated


NC AT 


no paradoxical abd motion


no accessory muscle use 


rrr


no edema


no rash


Labs





Laboratory Tests








Test


 8/14/20


13:37 8/14/20


15:59 8/14/20


17:07 8/14/20


18:17


 


Glucose (Fingerstick)


 180 mg/dL


(70-99) 138 mg/dL


(70-99) 131 mg/dL


(70-99) 132 mg/dL


(70-99)


 


Test


 8/14/20


19:24 8/14/20


20:28 8/14/20


22:36 8/15/20


00:39


 


Glucose (Fingerstick)


 152 mg/dL


(70-99) 154 mg/dL


(70-99) 134 mg/dL


(70-99) 113 mg/dL


(70-99)


 


Test


 8/15/20


01:43 8/15/20


02:48 8/15/20


03:56 8/15/20


05:06


 


Glucose (Fingerstick)


 161 mg/dL


(70-99) 134 mg/dL


(70-99) 127 mg/dL


(70-99) 123 mg/dL


(70-99)


 


Test


 8/15/20


05:20 8/15/20


06:16 8/15/20


07:52 8/15/20


08:00


 


White Blood Count


 22.0 x10^3/uL


(4.0-11.0) 


 


 





 


Red Blood Count


 4.85 x10^6/uL


(4.30-5.70) 


 


 





 


Hemoglobin


 12.8 g/dL


(13.0-17.5) 


 


 





 


Hematocrit


 39.8 %


(39.0-53.0) 


 


 





 


Mean Corpuscular Volume 82 fL ()    


 


Mean Corpuscular Hemoglobin 26 pg (25-35)    


 


Mean Corpuscular Hemoglobin


Concent 32 g/dL


(31-37) 


 


 





 


Red Cell Distribution Width


 15.2 %


(11.5-14.5) 


 


 





 


Platelet Count


 362 x10^3/uL


(140-400) 


 


 





 


Neutrophils (%) (Auto) 87 % (31-73)    


 


Lymphocytes (%) (Auto) 6 % (24-48)    


 


Monocytes (%) (Auto) 8 % (0-9)    


 


Eosinophils (%) (Auto) 0 % (0-3)    


 


Basophils (%) (Auto) 0 % (0-3)    


 


Neutrophils # (Auto)


 19.0 x10^3/uL


(1.8-7.7) 


 


 





 


Lymphocytes # (Auto)


 1.3 x10^3/uL


(1.0-4.8) 


 


 





 


Monocytes # (Auto)


 1.7 x10^3/uL


(0.0-1.1) 


 


 





 


Eosinophils # (Auto)


 0.0 x10^3/uL


(0.0-0.7) 


 


 





 


Basophils # (Auto)


 0.0 x10^3/uL


(0.0-0.2) 


 


 





 


Sodium Level


 143 mmol/L


(136-145) 


 


 





 


Potassium Level


 5.3 mmol/L


(3.5-5.1) 


 


 





 


Chloride Level


 110 mmol/L


() 


 


 





 


Carbon Dioxide Level


 27 mmol/L


(21-32) 


 


 





 


Anion Gap 6 (6-14)    


 


Blood Urea Nitrogen


 41 mg/dL


(8-26) 


 


 





 


Creatinine


 1.1 mg/dL


(0.7-1.3) 


 


 





 


Estimated GFR


(Cockcroft-Gault) 89.3 


 


 


 





 


Glucose Level


 122 mg/dL


(70-99) 


 


 





 


Calcium Level


 7.9 mg/dL


(8.5-10.1) 


 


 





 


Magnesium Level


 2.3 mg/dL


(1.8-2.4) 


 


 





 


Glucose (Fingerstick)


 


 128 mg/dL


(70-99) 120 mg/dL


(70-99) 





 


O2 Saturation    93 % (92-99) 


 


Arterial Blood pH


 


 


 


 7.42


(7.35-7.45)


 


Arterial Blood pH (Temp


corrected) 


 


 


 7.41 





 


Arterial Blood pCO2 at


Patient Temp 


 


 


 35 mmHg


(35-46)


 


Arterial Blood pCO2 (Temp


correct) 


 


 


 36 mmHg 





 


Arterial Blood pO2 at Patient


Temp 


 


 


 67 mmHg


()


 


Arterial Blood pO2 (Temp


corrected) 


 


 


 68 mmHg 





 


Arterial Blood HCO3


 


 


 


 22 mmol/L


(21-28)


 


Arterial Blood Base Excess


 


 


 


 -2 mmol/L


(-3-3)


 


FiO2    50 


 


Test


 8/15/20


08:51 8/15/20


09:59 8/15/20


11:14 8/15/20


12:21


 


Glucose (Fingerstick)


 128 mg/dL


(70-99) 147 mg/dL


(70-99) 139 mg/dL


(70-99) 170 mg/dL


(70-99)


 


Test


 8/15/20


13:23 8/15/20


14:30 8/15/20


15:27 8/15/20


16:24


 


Glucose (Fingerstick)


 142 mg/dL


(70-99) 142 mg/dL


(70-99) 197 mg/dL


(70-99) 194 mg/dL


(70-99)


 


Test


 8/15/20


18:00 8/15/20


19:19 8/15/20


20:24 8/15/20


21:58


 


Glucose (Fingerstick)


 191 mg/dL


(70-99) 167 mg/dL


(70-99) 148 mg/dL


(70-99) 126 mg/dL


(70-99)


 


Test


 8/15/20


23:31 8/16/20


01:02 8/16/20


02:17 8/16/20


03:27


 


Glucose (Fingerstick)


 98 mg/dL


(70-99) 118 mg/dL


(70-99) 115 mg/dL


(70-99) 117 mg/dL


(70-99)


 


Test


 8/16/20


04:44 8/16/20


05:00 8/16/20


06:11 8/16/20


07:32


 


Glucose (Fingerstick)


 111 mg/dL


(70-99) 


 150 mg/dL


(70-99) 155 mg/dL


(70-99)


 


White Blood Count


 


 24.5 x10^3/uL


(4.0-11.0) 


 





 


Red Blood Count


 


 4.68 x10^6/uL


(4.30-5.70) 


 





 


Hemoglobin


 


 12.3 g/dL


(13.0-17.5) 


 





 


Hematocrit


 


 38.5 %


(39.0-53.0) 


 





 


Mean Corpuscular Volume  82 fL ()   


 


Mean Corpuscular Hemoglobin  26 pg (25-35)   


 


Mean Corpuscular Hemoglobin


Concent 


 32 g/dL


(31-37) 


 





 


Red Cell Distribution Width


 


 15.0 %


(11.5-14.5) 


 





 


Platelet Count


 


 332 x10^3/uL


(140-400) 


 





 


Neutrophils (%) (Auto)  87 % (31-73)   


 


Lymphocytes (%) (Auto)  5 % (24-48)   


 


Monocytes (%) (Auto)  7 % (0-9)   


 


Eosinophils (%) (Auto)  0 % (0-3)   


 


Basophils (%) (Auto)  0 % (0-3)   


 


Neutrophils # (Auto)


 


 21.4 x10^3/uL


(1.8-7.7) 


 





 


Lymphocytes # (Auto)


 


 1.3 x10^3/uL


(1.0-4.8) 


 





 


Monocytes # (Auto)


 


 1.8 x10^3/uL


(0.0-1.1) 


 





 


Eosinophils # (Auto)


 


 0.0 x10^3/uL


(0.0-0.7) 


 





 


Basophils # (Auto)


 


 0.0 x10^3/uL


(0.0-0.2) 


 





 


Sodium Level


 


 143 mmol/L


(136-145) 


 





 


Potassium Level


 


 5.5 mmol/L


(3.5-5.1) 


 





 


Chloride Level


 


 110 mmol/L


() 


 





 


Carbon Dioxide Level


 


 25 mmol/L


(21-32) 


 





 


Anion Gap  8 (6-14)   


 


Blood Urea Nitrogen


 


 43 mg/dL


(8-26) 


 





 


Creatinine


 


 1.0 mg/dL


(0.7-1.3) 


 





 


Estimated GFR


(Cockcroft-Gault) 


 99.6 


 


 





 


Glucose Level


 


 133 mg/dL


(70-99) 


 





 


Calcium Level


 


 8.0 mg/dL


(8.5-10.1) 


 





 


Magnesium Level


 


 2.3 mg/dL


(1.8-2.4) 


 





 


Test


 8/16/20


08:25 8/16/20


08:37 8/16/20


09:39 8/16/20


10:46


 


O2 Saturation 97 % (92-99)    


 


Arterial Blood pH


 7.44


(7.35-7.45) 


 


 





 


Arterial Blood pCO2 at


Patient Temp 29 mmHg


(35-46) 


 


 





 


Arterial Blood pO2 at Patient


Temp 86 mmHg


() 


 


 





 


Arterial Blood HCO3


 19 mmol/L


(21-28) 


 


 





 


Arterial Blood Base Excess


 -4 mmol/L


(-3-3) 


 


 





 


FiO2 50%    


 


Glucose (Fingerstick)


 


 156 mg/dL


(70-99) 168 mg/dL


(70-99) 132 mg/dL


(70-99)








Laboratory Tests








Test


 8/15/20


12:21 8/15/20


13:23 8/15/20


14:30 8/15/20


15:27


 


Glucose (Fingerstick)


 170 mg/dL


(70-99) 142 mg/dL


(70-99) 142 mg/dL


(70-99) 197 mg/dL


(70-99)


 


Test


 8/15/20


16:24 8/15/20


18:00 8/15/20


19:19 8/15/20


20:24


 


Glucose (Fingerstick)


 194 mg/dL


(70-99) 191 mg/dL


(70-99) 167 mg/dL


(70-99) 148 mg/dL


(70-99)


 


Test


 8/15/20


21:58 8/15/20


23:31 8/16/20


01:02 8/16/20


02:17


 


Glucose (Fingerstick)


 126 mg/dL


(70-99) 98 mg/dL


(70-99) 118 mg/dL


(70-99) 115 mg/dL


(70-99)


 


Test


 8/16/20


03:27 8/16/20


04:44 8/16/20


05:00 8/16/20


06:11


 


Glucose (Fingerstick)


 117 mg/dL


(70-99) 111 mg/dL


(70-99) 


 150 mg/dL


(70-99)


 


White Blood Count


 


 


 24.5 x10^3/uL


(4.0-11.0) 





 


Red Blood Count


 


 


 4.68 x10^6/uL


(4.30-5.70) 





 


Hemoglobin


 


 


 12.3 g/dL


(13.0-17.5) 





 


Hematocrit


 


 


 38.5 %


(39.0-53.0) 





 


Mean Corpuscular Volume   82 fL ()  


 


Mean Corpuscular Hemoglobin   26 pg (25-35)  


 


Mean Corpuscular Hemoglobin


Concent 


 


 32 g/dL


(31-37) 





 


Red Cell Distribution Width


 


 


 15.0 %


(11.5-14.5) 





 


Platelet Count


 


 


 332 x10^3/uL


(140-400) 





 


Neutrophils (%) (Auto)   87 % (31-73)  


 


Lymphocytes (%) (Auto)   5 % (24-48)  


 


Monocytes (%) (Auto)   7 % (0-9)  


 


Eosinophils (%) (Auto)   0 % (0-3)  


 


Basophils (%) (Auto)   0 % (0-3)  


 


Neutrophils # (Auto)


 


 


 21.4 x10^3/uL


(1.8-7.7) 





 


Lymphocytes # (Auto)


 


 


 1.3 x10^3/uL


(1.0-4.8) 





 


Monocytes # (Auto)


 


 


 1.8 x10^3/uL


(0.0-1.1) 





 


Eosinophils # (Auto)


 


 


 0.0 x10^3/uL


(0.0-0.7) 





 


Basophils # (Auto)


 


 


 0.0 x10^3/uL


(0.0-0.2) 





 


Sodium Level


 


 


 143 mmol/L


(136-145) 





 


Potassium Level


 


 


 5.5 mmol/L


(3.5-5.1) 





 


Chloride Level


 


 


 110 mmol/L


() 





 


Carbon Dioxide Level


 


 


 25 mmol/L


(21-32) 





 


Anion Gap   8 (6-14)  


 


Blood Urea Nitrogen


 


 


 43 mg/dL


(8-26) 





 


Creatinine


 


 


 1.0 mg/dL


(0.7-1.3) 





 


Estimated GFR


(Cockcroft-Gault) 


 


 99.6 


 





 


Glucose Level


 


 


 133 mg/dL


(70-99) 





 


Calcium Level


 


 


 8.0 mg/dL


(8.5-10.1) 





 


Magnesium Level


 


 


 2.3 mg/dL


(1.8-2.4) 





 


Test


 8/16/20


07:32 8/16/20


08:25 8/16/20


08:37 8/16/20


09:39


 


Glucose (Fingerstick)


 155 mg/dL


(70-99) 


 156 mg/dL


(70-99) 168 mg/dL


(70-99)


 


O2 Saturation  97 % (92-99)   


 


Arterial Blood pH


 


 7.44


(7.35-7.45) 


 





 


Arterial Blood pCO2 at


Patient Temp 


 29 mmHg


(35-46) 


 





 


Arterial Blood pO2 at Patient


Temp 


 86 mmHg


() 


 





 


Arterial Blood HCO3


 


 19 mmol/L


(21-28) 


 





 


Arterial Blood Base Excess


 


 -4 mmol/L


(-3-3) 


 





 


FiO2  50%   


 


Test


 8/16/20


10:46 


 


 





 


Glucose (Fingerstick)


 132 mg/dL


(70-99) 


 


 











Medications





Active Scripts








 Medications  Dose


 Route/Sig


 Max Daily Dose Days Date Category


 


 Levemir (Insulin


 Detemir) 100


 Unit/1 Ml Vial  65 Unit


 SQ BID


   8/4/20 Reported


 


 Novolog (Insulin


 Aspart) 100


 Unit/1 Ml Vial  34 Unit


 SQ TID


   8/4/20 Reported


 


 Hydrochlorothiazide


 Tablet


  (Hydrochlorothiazide)


 12.5 Mg Tablet  12.5 Mg


 PO DAILY


   3/15/20 Rx








Comments


cxr reviewed


1. Stable support lines and tubes


 


2. Increased right medial basilar consolidation is interim possibly 


relating to superimposed bronchopneumonia and/or atelectasis


 


3. Diffuse interstitial and patchy alveolar infiltrates  





CT chest reviewed 


 


1.  Essentially nondiagnostic study for evaluation of pulmonary embolism 


due to contrast bolus timing. If persistent clinical concern, repeat CT 


angiogram or VQ scan can better assess.


2.  Multifocal ground glass opacities bilaterally predominantly within the


left upper and lower lobes, concerning for infection such as viral 


pneumonia, ARDS or asymmetric pulmonary edema. Recommend follow-up to 


ensure resolution.





Impression


.


1.  Acute hypoxic respiratory failure secondary to COVID-19 pneumonia and ALI.--

 intubated 8/7


2.  Abnormal CT chest consistent with pneumonia with ground glass opacities


bilaterally, typical finding with COVID-19 pneumonia


3.  Underlying morbid obesity. ? sherrell


4.  Diabetes.


5.  Hypertension.


6.  MIAN, Pre-renal azotemia , likely contributed by steroid effect.


7. Hypertension-- improved 


8. fever, improving


9. morbid obesity, prob sherrell





Plan


.


cont vent support, setting reviewed, titrate fio2 to keep sat 92-94%, Fi02 50% 

and PEEP 8, ABG reviewed, decrease peep as tolerated, just decreased peep to 7


abx per ID, 


Continue IV steroids 


Monitor renal function 


s/p convalescent plasma, remdesivir, follow clinical course 


HTN per PCP-- off cardene gtt


DM per PCP -- on insulin gtt


enteral nutrition, tolerating well


DVT/GI PPX 


Discussed with RN and RT








remains critically ill 


cct 30 min wo overlap











AIDA PATTON MD               Aug 16, 2020 11:39

## 2020-08-16 NOTE — PDOC
[facility] PROGRESS NOTE


Date of Service


DOS:


DATE: 8/16/20 


TIME: 12:04





Chief Complaint


Chief Complaint


Acute hypoxic respiratory failure secondary to COVID-19 pneumonia. s/p 

intubation, 8/7


Bilateral pulmonary infiltrates - Abnormal CT chest consistent with pneumonia 

with ground glass opacities bilaterally, poor contrast, pulmonary embolism could

not be ruled out, but he has low clinical suspicion for pulmonary embolism and 

as such no further investigation is needed.


Underlying morbid obesity.


Diabetes.


Hypertension.


Sepsis - Fever and leukocytosis. Allergy to Zosyn.





History of Present Illness


History of Present Illness


8/16/2020


Patient seen and examined in the COVID-19 ICU


He is on 50% FiO2 on the vent


AC/26/6 100/50% with 7 of PEEP


Chart reviewed


Discussed with RN


He remains critically ill although blood gases are starting to improve a little











8/15/2020


Patient seen and examined in the COVID-19 ICU


He remains intubated on assist control 26 650% FiO2 with 8 of PEEP


Discussed with RN


Chart reviewed


Patient has Hopkins to bedside drainage and mitts for patient safety


Still very critically ill








8/14/2020


Patient is seen and examined in ICU


Patient is intubated, on vent AC/26/600/50% FiO2 with 8 PEEP and sedated on 

Precedex, Versed and Fentanyl


Hopkins BSD


Discussed with RN


Charts reviewed





8/13/2020


Patient is seen and examined in ICU


Patient is intubated, on vent AC/26/600/50% FiO2 with 8 PEEP and sedated on 

Precedex, Versed and Fentanyl


Glycemic control obtained


Discussed with RN


Charts reviewed





8/12/2020


Patient is seen and examined in ICU


Patient is intubated, on vent AC/26/600/45% FiO2 with 8 PEEP and sedated on 

Precedex, Versed and Fentanyl


OG Tube at 70cc/hr


Discussed with RN


Charts reviewed








08/11/2020


Patient is seen and examined in ICU


Patient is intubated, on vent AC/26/600/70% FiO2 and sedated on Precedex, Versed

and Fentanyl


Discussed with RN


Charts reviewed





08/10/2020


Patient is seen and examined in ICU


Patient is intubated, on vent AC/26/600/55% FiO2 and sedated on Precedex, Versed

and Fentanyl


Received plasma and Remdesivir


Patient had some fever


Discussed with RN


Charts reviewed





Mr Dior is a 41-year-old morbidly obese male with a BMI of 51, DM2, HTN, 

diabetic foot ulcers who was brought into the hospital complaining of shortness 

of breath for 5 days prior to admit.  He was tested positive for SARS-CoV-2 

(COVID 19).  He had a cough, which has been nonproductive.  No headaches, no na

usea, vomiting, no diarrhea, no dysuria, no focal weakness. Required 2L NCO2 to 

maintain O2 saturations > 88%. He had CTA chest nondiagnostic for pulmonary 

embolism, but with multifocal ground glass opacities bilaterally predominantly 

in the left upper and lower lobes and concerning for COVID pneumonia. His T-max 

is 100.2.  Pulm and ID consulted, admitted for further care.





8/5: On 4L NCO2, asking to leave the hospital.


8/6: Resting on 15L NCO2, trying to take off O2. Transitioned to 35% vapotherm


8/7: Transferred to ICU for Vapotherm. ABG 63 on 90%.  Glucose in the 300s 

despite high dosing of insulin. No abdominal pain. S/p convalescent FFP


8/8: Afebrile. Seen on vent. He was intubated 8/7, on vent, sedated on versed, 

fentanyl, precedex, small ett secretion, on peep 10, fio2 60%.  Had a right IJ 

placed by anesthesia had bleeding for 15 to 20 minutes after placement.  Labs 

with WBC 15.7, Hb 12, platelets 255, , K5, BUN 58, CR 1.6, glucose 132, 

phosphorus 5.7.





Febrile 101.1 F.  Seen on vent AC mode tidal volume 600 FiO2 40%, PEEP of 9.  

Glucose trending downward.  Started on tube feeds.  Still with good urine 

output.





Plan:


Add Carbapenem per ID. +/- zyvox


Cont ICU, critically ill





Vitals/I&O


Vitals/I&O:





                                   Vital Signs








  Date Time  Temp Pulse Resp B/P (MAP) Pulse Ox O2 Delivery O2 Flow Rate FiO2


 


8/16/20 11:53  80  119/64    


 


8/16/20 11:36     99 Ventilator  


 


8/16/20 11:00   25     


 


8/16/20 08:00 98.4       





 98.4       


 


8/16/20 06:47       40.0 














                                    I & O   


 


 8/15/20 8/15/20 8/16/20





 15:00 23:00 07:00


 


Intake Total 400 ml 2270.9 ml 2761 ml


 


Output Total 1000 ml 1290 ml 1475 ml


 


Balance -600 ml 980.9 ml 1286 ml











Physical Exam


Physical Exam:


GENERAL: Propped up in bed, orally intubated and sedated. + mitts 


HEENT:  Pupils equal. ETT and OGT in place 


NECK:  Supple


LUNGS:  Diminished aeration in bases 


HEART:  S1, S2 regular.


ABDOMEN:  Obese, bowel sounds present, soft


: Hopkins in place 


EXTREMITIES:  Trace edema, no cyanosis. SCDs bilaterally. 


SKIN:  No signs of rash. 


NEUROLOGIC:  Sedated


RIJ (8/7) without signs of complications


General:  Other (intubated and sedated)


Heart:  Regular rate, Normal S1, Normal S2, No murmurs


Abdomen:  Normal bowel sounds, No masses


Extremities:  Normal pulses, No tenderness/swelling


Skin:  No significant lesion





Labs


Labs:





Laboratory Tests








Test


 8/15/20


12:21 8/15/20


13:23 8/15/20


14:30 8/15/20


15:27


 


Glucose (Fingerstick)


 170 mg/dL


(70-99) 142 mg/dL


(70-99) 142 mg/dL


(70-99) 197 mg/dL


(70-99)


 


Test


 8/15/20


16:24 8/15/20


18:00 8/15/20


19:19 8/15/20


20:24


 


Glucose (Fingerstick)


 194 mg/dL


(70-99) 191 mg/dL


(70-99) 167 mg/dL


(70-99) 148 mg/dL


(70-99)


 


Test


 8/15/20


21:58 8/15/20


23:31 8/16/20


01:02 8/16/20


02:17


 


Glucose (Fingerstick)


 126 mg/dL


(70-99) 98 mg/dL


(70-99) 118 mg/dL


(70-99) 115 mg/dL


(70-99)


 


Test


 8/16/20


03:27 8/16/20


04:44 8/16/20


05:00 8/16/20


06:11


 


Glucose (Fingerstick)


 117 mg/dL


(70-99) 111 mg/dL


(70-99) 


 150 mg/dL


(70-99)


 


White Blood Count


 


 


 24.5 x10^3/uL


(4.0-11.0) 





 


Red Blood Count


 


 


 4.68 x10^6/uL


(4.30-5.70) 





 


Hemoglobin


 


 


 12.3 g/dL


(13.0-17.5) 





 


Hematocrit


 


 


 38.5 %


(39.0-53.0) 





 


Mean Corpuscular Volume   82 fL ()  


 


Mean Corpuscular Hemoglobin   26 pg (25-35)  


 


Mean Corpuscular Hemoglobin


Concent 


 


 32 g/dL


(31-37) 





 


Red Cell Distribution Width


 


 


 15.0 %


(11.5-14.5) 





 


Platelet Count


 


 


 332 x10^3/uL


(140-400) 





 


Neutrophils (%) (Auto)   87 % (31-73)  


 


Lymphocytes (%) (Auto)   5 % (24-48)  


 


Monocytes (%) (Auto)   7 % (0-9)  


 


Eosinophils (%) (Auto)   0 % (0-3)  


 


Basophils (%) (Auto)   0 % (0-3)  


 


Neutrophils # (Auto)


 


 


 21.4 x10^3/uL


(1.8-7.7) 





 


Lymphocytes # (Auto)


 


 


 1.3 x10^3/uL


(1.0-4.8) 





 


Monocytes # (Auto)


 


 


 1.8 x10^3/uL


(0.0-1.1) 





 


Eosinophils # (Auto)


 


 


 0.0 x10^3/uL


(0.0-0.7) 





 


Basophils # (Auto)


 


 


 0.0 x10^3/uL


(0.0-0.2) 





 


Sodium Level


 


 


 143 mmol/L


(136-145) 





 


Potassium Level


 


 


 5.5 mmol/L


(3.5-5.1) 





 


Chloride Level


 


 


 110 mmol/L


() 





 


Carbon Dioxide Level


 


 


 25 mmol/L


(21-32) 





 


Anion Gap   8 (6-14)  


 


Blood Urea Nitrogen


 


 


 43 mg/dL


(8-26) 





 


Creatinine


 


 


 1.0 mg/dL


(0.7-1.3) 





 


Estimated GFR


(Cockcroft-Gault) 


 


 99.6 


 





 


Glucose Level


 


 


 133 mg/dL


(70-99) 





 


Calcium Level


 


 


 8.0 mg/dL


(8.5-10.1) 





 


Magnesium Level


 


 


 2.3 mg/dL


(1.8-2.4) 





 


Test


 8/16/20


07:32 8/16/20


08:25 8/16/20


08:37 8/16/20


09:39


 


Glucose (Fingerstick)


 155 mg/dL


(70-99) 


 156 mg/dL


(70-99) 168 mg/dL


(70-99)


 


O2 Saturation  97 % (92-99)   


 


Arterial Blood pH


 


 7.44


(7.35-7.45) 


 





 


Arterial Blood pCO2 at


Patient Temp 


 29 mmHg


(35-46) 


 





 


Arterial Blood pO2 at Patient


Temp 


 86 mmHg


() 


 





 


Arterial Blood HCO3


 


 19 mmol/L


(21-28) 


 





 


Arterial Blood Base Excess


 


 -4 mmol/L


(-3-3) 


 





 


FiO2  50%   


 


Test


 8/16/20


10:46 8/16/20


11:54 


 





 


Glucose (Fingerstick)


 132 mg/dL


(70-99) 135 mg/dL


(70-99) 


 














Assessment and Plan


Assessmemt and Plan


Problems


Medical Problems:


(1) COVID-19 virus detected


Status: Acute  





Acute hypoxic respiratory failure secondary to COVID-19 pneumonia. s/p 

intubation, 8/7


Bilateral pulmonary infiltrates - Abnormal CT chest consistent with pneumonia 

with ground glass opacities bilaterally, poor contrast, pulmonary embolism could

not be ruled out, but he has low clinical suspicion for pulmonary embolism and 

as such no further investigation is needed.


Underlying morbid obesity.


Diabetes.


Hypertension.


Sepsis - Fever and leukocytosis. Allergy to Zosyn.





Plan


Vent weaning


Precedex fentanyl and propofol


Home meds


Daily chest x-rays and ABGs


Appreciate subspecialist input


OG feeds


DVT prophylaxis


He received a full Covid-19 protocol including steroids antibiotics and plasma 

and Remdesivir


ICU monitoring


Prognosis guarded


He remains critically ill


Total time 31 minutes





Comment


Review of Relevant


I have reviewed the following items joseph (where applicable) has been applied.


Medications:





Current Medications








 Medications


  (Trade)  Dose


 Ordered  Sig/Toni


 Route


 PRN Reason  Start Time


 Stop Time Status Last Admin


Dose Admin


 


 Enoxaparin Sodium


  (Lovenox 60mg


 Syringe)  60 mg  Q12HR


 SQ


   8/15/20 21:00


    8/16/20 08:29





 


 Metoclopramide HCl


  (Reglan Vial)  10 mg  Q8HRS


 IVP


   8/16/20 08:00


    8/16/20 08:29





 


 Hydralazine HCl


  (Apresoline Inj)  10 mg  Q12HR


 IVP


   8/16/20 12:00


    8/16/20 11:53














Justicifation of Admission Dx:


Justifications for Admission:


Justification of Admission Dx:  Yes


Respiratory Failure:  Non-Cardiac Pulm Edema











JAIRO BUSCH K III DO           Aug 16, 2020 12:06

## 2020-08-16 NOTE — PDOC
Infectious Disease Note


Subjective


Subjective


Pt remains intubated/sedated


FiO2 at 50% PEEP 8 


No fevers for 96 hrs





ROS


ROS


unobtainable





Vital Sign


Vital Signs





Vital Signs








  Date Time  Temp Pulse Resp B/P (MAP) Pulse Ox O2 Delivery O2 Flow Rate FiO2


 


8/16/20 06:47   28  96 Ventilator 40.0 


 


8/16/20 06:30  80  162/87    


 


8/16/20 04:00 99.4       





 99.4       











Physical Exam


PHYSICAL EXAM


GENERAL: Propped up in bed, orally intubated and sedated. + mitts 


HEENT:  Pupils equal. ETT and OGT in place 


NECK:  Supple


LUNGS:  Diminished aeration in bases 


HEART:  S1, S2 regular.


ABDOMEN:  Obese, bowel sounds present, soft


: Hopkins in place 


EXTREMITIES:  Trace edema, no cyanosis. SCDs bilaterally. 


SKIN:  No signs of rash. 


NEUROLOGIC:  Sedated


RIJ (8/7) without signs of complications





Labs


Lab





Laboratory Tests








Test


 8/15/20


08:51 8/15/20


09:59 8/15/20


11:14 8/15/20


12:21


 


Glucose (Fingerstick)


 128 mg/dL


(70-99) 147 mg/dL


(70-99) 139 mg/dL


(70-99) 170 mg/dL


(70-99)


 


Test


 8/15/20


13:23 8/15/20


14:30 8/15/20


15:27 8/15/20


16:24


 


Glucose (Fingerstick)


 142 mg/dL


(70-99) 142 mg/dL


(70-99) 197 mg/dL


(70-99) 194 mg/dL


(70-99)


 


Test


 8/15/20


18:00 8/15/20


19:19 8/15/20


20:24 8/15/20


21:58


 


Glucose (Fingerstick)


 191 mg/dL


(70-99) 167 mg/dL


(70-99) 148 mg/dL


(70-99) 126 mg/dL


(70-99)


 


Test


 8/15/20


23:31 8/16/20


01:02 8/16/20


02:17 8/16/20


03:27


 


Glucose (Fingerstick)


 98 mg/dL


(70-99) 118 mg/dL


(70-99) 115 mg/dL


(70-99) 117 mg/dL


(70-99)


 


Test


 8/16/20


04:44 8/16/20


05:00 8/16/20


06:11 8/16/20


07:32


 


Glucose (Fingerstick)


 111 mg/dL


(70-99) 


 150 mg/dL


(70-99) 155 mg/dL


(70-99)


 


White Blood Count


 


 24.5 x10^3/uL


(4.0-11.0) 


 





 


Red Blood Count


 


 4.68 x10^6/uL


(4.30-5.70) 


 





 


Hemoglobin


 


 12.3 g/dL


(13.0-17.5) 


 





 


Hematocrit


 


 38.5 %


(39.0-53.0) 


 





 


Mean Corpuscular Volume  82 fL ()   


 


Mean Corpuscular Hemoglobin  26 pg (25-35)   


 


Mean Corpuscular Hemoglobin


Concent 


 32 g/dL


(31-37) 


 





 


Red Cell Distribution Width


 


 15.0 %


(11.5-14.5) 


 





 


Platelet Count


 


 332 x10^3/uL


(140-400) 


 





 


Neutrophils (%) (Auto)  87 % (31-73)   


 


Lymphocytes (%) (Auto)  5 % (24-48)   


 


Monocytes (%) (Auto)  7 % (0-9)   


 


Eosinophils (%) (Auto)  0 % (0-3)   


 


Basophils (%) (Auto)  0 % (0-3)   


 


Neutrophils # (Auto)


 


 21.4 x10^3/uL


(1.8-7.7) 


 





 


Lymphocytes # (Auto)


 


 1.3 x10^3/uL


(1.0-4.8) 


 





 


Monocytes # (Auto)


 


 1.8 x10^3/uL


(0.0-1.1) 


 





 


Eosinophils # (Auto)


 


 0.0 x10^3/uL


(0.0-0.7) 


 





 


Basophils # (Auto)


 


 0.0 x10^3/uL


(0.0-0.2) 


 











Micro





Microbiology


8/9/20 Blood Culture - Final, Complete


         NO GROWTH AFTER 5 DAYS





Objective


Assessment


Fever resolved


COVID-19 positive, 7/29. s/p convalescent plasma and remdesivir,


Bilateral pulmonary infiltrates.


Leukocytosis, on steroids, increased, could be reactive


Allergy to Zosyn. h/o hives and swelling. 


Acute respiratory failure s/p intubation, 8/7


Renal insufficiency  


Diabetes.  Poorly controlled


Hypertension.


Obesity.


Constipation





Plan


Plan of Care


Cont Merrem 8/9 - wean off soon 


s/p convalescent plasma and remdesivir 


Steroids per primary 


Maintain aspiration precautions


Airborne isolation for COVID-19


Discussed with nursing





Critically ill





Attending Co-Sign


Attending Co-Sign


The patient was seen and  examined at the bedside. The chart was reviewed. The 

case was discussed. Agree with the plan of care.











MOLLY ARECHIGA        Aug 16, 2020 08:28


SATNAM SALINAS MD           Aug 16, 2020 14:04

## 2020-08-17 NOTE — PDOC
Infectious Disease Note


Subjective


Subjective


Pt remains intubated/sedated


FiO2 at 50% PEEP 7 


No fevers for 96 hrs





ROS


ROS


unable to obtain





Vital Sign


Vital Signs





Vital Signs








  Date Time  Temp Pulse Resp B/P (MAP) Pulse Ox O2 Delivery O2 Flow Rate FiO2


 


8/17/20 07:00  73 25 136/76 (96) 99 Ventilator  


 


8/17/20 06:16       40.0 


 


8/17/20 00:00 98.7       





 98.7       











Physical Exam


PHYSICAL EXAM


GENERAL: Propped up in bed, orally intubated and sedated. + mitts 


HEENT:  Pupils equal. ETT and OGT in place 


NECK:  Supple


LUNGS:  Diminished aeration in bases 


HEART:  S1, S2 regular.


ABDOMEN:  Obese, bowel sounds present, soft


: Hopkins in place 


EXTREMITIES:  Trace edema, no cyanosis. SCDs bilaterally. 


SKIN:  No signs of rash. 


NEUROLOGIC:  Sedated


RIJ (8/7) without signs of complications





Labs


Lab





Laboratory Tests








Test


 8/16/20


08:25 8/16/20


08:37 8/16/20


09:39 8/16/20


10:46


 


O2 Saturation 97 % (92-99)    


 


Arterial Blood pH


 7.44


(7.35-7.45) 


 


 





 


Arterial Blood pCO2 at


Patient Temp 29 mmHg


(35-46) 


 


 





 


Arterial Blood pO2 at Patient


Temp 86 mmHg


() 


 


 





 


Arterial Blood HCO3


 19 mmol/L


(21-28) 


 


 





 


Arterial Blood Base Excess


 -4 mmol/L


(-3-3) 


 


 





 


FiO2 50%    


 


Glucose (Fingerstick)


 


 156 mg/dL


(70-99) 168 mg/dL


(70-99) 132 mg/dL


(70-99)


 


Test


 8/16/20


11:54 8/16/20


12:57 8/16/20


14:19 8/16/20


15:25


 


Glucose (Fingerstick)


 135 mg/dL


(70-99) 152 mg/dL


(70-99) 151 mg/dL


(70-99) 139 mg/dL


(70-99)


 


Test


 8/16/20


17:29 8/16/20


18:31 8/16/20


20:09 8/16/20


21:35


 


Glucose (Fingerstick)


 109 mg/dL


(70-99) 106 mg/dL


(70-99) 126 mg/dL


(70-99) 152 mg/dL


(70-99)


 


Test


 8/16/20


22:56 8/17/20


00:04 8/17/20


01:38 8/17/20


02:45


 


Glucose (Fingerstick)


 135 mg/dL


(70-99) 162 mg/dL


(70-99) 150 mg/dL


(70-99) 173 mg/dL


(70-99)


 


Test


 8/17/20


03:54 8/17/20


05:26 8/17/20


06:45 8/17/20


07:41


 


Glucose (Fingerstick)


 158 mg/dL


(70-99) 144 mg/dL


(70-99) 138 mg/dL


(70-99) 113 mg/dL


(70-99)








Micro





IMPRESSION:


 


Gaseous distention of the colon with a moderate amount of fecal material 


in the right colon.





Microbiology


8/9/20 Blood Culture - Final, Complete


         NO GROWTH AFTER 5 DAYS





Objective


Assessment


Fever resolved


COVID-19 positive, 7/29. s/p convalescent plasma and remdesivir,


Bilateral pulmonary infiltrates.


Ileus


Leukocytosis, on steroids, increased, could be reactive


Allergy to Zosyn. h/o hives and swelling. 


Acute respiratory failure s/p intubation, 8/7


Renal insufficiency  


Diabetes.  Poorly controlled


Hypertension.


Obesity.


Constipation





Plan


Plan of Care


Discont Merrem 8/9 - 8/17


s/p convalescent plasma and remdesivir 


Steroids per primary 


Consider IJ change soon


Maintain aspiration precautions


Airborne isolation for COVID-19


Discussed with nursing





Critically ill











YODIT BRUNER MD              Aug 17, 2020 08:01

## 2020-08-17 NOTE — PDOC
PULMONARY PROGRESS NOTES


DATE: 8/17/20 


TIME: 10:47


Subjective


intubated 8/7, on vent, small secretion, peep 8, 50% fi02


sedated on versed, fentanyl, precedex, 


on insulin gtt, 


no overnight concerns from nursing


Vitals





Vital Signs








  Date Time  Temp Pulse Resp B/P (MAP) Pulse Ox O2 Delivery O2 Flow Rate FiO2


 


8/17/20 09:00  72 25 119/76 (90) 99 Ventilator  


 


8/17/20 08:00 99.5       





 99.5       


 


8/17/20 06:16       40.0 








Comments


ros   unable to obtain  intubated   sedated


on vent, 


sedated


NC AT 


no paradoxical abd motion


no accessory muscle use 


rrr


no edema


no rash


Labs





Laboratory Tests








Test


 8/15/20


11:14 8/15/20


12:21 8/15/20


13:23 8/15/20


14:30


 


Glucose (Fingerstick)


 139 mg/dL


(70-99) 170 mg/dL


(70-99) 142 mg/dL


(70-99) 142 mg/dL


(70-99)


 


Test


 8/15/20


15:27 8/15/20


16:24 8/15/20


18:00 8/15/20


19:19


 


Glucose (Fingerstick)


 197 mg/dL


(70-99) 194 mg/dL


(70-99) 191 mg/dL


(70-99) 167 mg/dL


(70-99)


 


Test


 8/15/20


20:24 8/15/20


21:58 8/15/20


23:31 8/16/20


01:02


 


Glucose (Fingerstick)


 148 mg/dL


(70-99) 126 mg/dL


(70-99) 98 mg/dL


(70-99) 118 mg/dL


(70-99)


 


Test


 8/16/20


02:17 8/16/20


03:27 8/16/20


04:44 8/16/20


05:00


 


Glucose (Fingerstick)


 115 mg/dL


(70-99) 117 mg/dL


(70-99) 111 mg/dL


(70-99) 





 


White Blood Count


 


 


 


 24.5 x10^3/uL


(4.0-11.0)


 


Red Blood Count


 


 


 


 4.68 x10^6/uL


(4.30-5.70)


 


Hemoglobin


 


 


 


 12.3 g/dL


(13.0-17.5)


 


Hematocrit


 


 


 


 38.5 %


(39.0-53.0)


 


Mean Corpuscular Volume    82 fL () 


 


Mean Corpuscular Hemoglobin    26 pg (25-35) 


 


Mean Corpuscular Hemoglobin


Concent 


 


 


 32 g/dL


(31-37)


 


Red Cell Distribution Width


 


 


 


 15.0 %


(11.5-14.5)


 


Platelet Count


 


 


 


 332 x10^3/uL


(140-400)


 


Neutrophils (%) (Auto)    87 % (31-73) 


 


Lymphocytes (%) (Auto)    5 % (24-48) 


 


Monocytes (%) (Auto)    7 % (0-9) 


 


Eosinophils (%) (Auto)    0 % (0-3) 


 


Basophils (%) (Auto)    0 % (0-3) 


 


Neutrophils # (Auto)


 


 


 


 21.4 x10^3/uL


(1.8-7.7)


 


Lymphocytes # (Auto)


 


 


 


 1.3 x10^3/uL


(1.0-4.8)


 


Monocytes # (Auto)


 


 


 


 1.8 x10^3/uL


(0.0-1.1)


 


Eosinophils # (Auto)


 


 


 


 0.0 x10^3/uL


(0.0-0.7)


 


Basophils # (Auto)


 


 


 


 0.0 x10^3/uL


(0.0-0.2)


 


Sodium Level


 


 


 


 143 mmol/L


(136-145)


 


Potassium Level


 


 


 


 5.5 mmol/L


(3.5-5.1)


 


Chloride Level


 


 


 


 110 mmol/L


()


 


Carbon Dioxide Level


 


 


 


 25 mmol/L


(21-32)


 


Anion Gap    8 (6-14) 


 


Blood Urea Nitrogen


 


 


 


 43 mg/dL


(8-26)


 


Creatinine


 


 


 


 1.0 mg/dL


(0.7-1.3)


 


Estimated GFR


(Cockcroft-Gault) 


 


 


 99.6 





 


Glucose Level


 


 


 


 133 mg/dL


(70-99)


 


Calcium Level


 


 


 


 8.0 mg/dL


(8.5-10.1)


 


Magnesium Level


 


 


 


 2.3 mg/dL


(1.8-2.4)


 


Test


 8/16/20


06:11 8/16/20


07:32 8/16/20


08:25 8/16/20


08:37


 


Glucose (Fingerstick)


 150 mg/dL


(70-99) 155 mg/dL


(70-99) 


 156 mg/dL


(70-99)


 


O2 Saturation   97 % (92-99)  


 


Arterial Blood pH


 


 


 7.44


(7.35-7.45) 





 


Arterial Blood pCO2 at


Patient Temp 


 


 29 mmHg


(35-46) 





 


Arterial Blood pO2 at Patient


Temp 


 


 86 mmHg


() 





 


Arterial Blood HCO3


 


 


 19 mmol/L


(21-28) 





 


Arterial Blood Base Excess


 


 


 -4 mmol/L


(-3-3) 





 


FiO2   50%  


 


Test


 8/16/20


09:39 8/16/20


10:46 8/16/20


11:54 8/16/20


12:57


 


Glucose (Fingerstick)


 168 mg/dL


(70-99) 132 mg/dL


(70-99) 135 mg/dL


(70-99) 152 mg/dL


(70-99)


 


Test


 8/16/20


14:19 8/16/20


15:25 8/16/20


17:29 8/16/20


18:31


 


Glucose (Fingerstick)


 151 mg/dL


(70-99) 139 mg/dL


(70-99) 109 mg/dL


(70-99) 106 mg/dL


(70-99)


 


Test


 8/16/20


20:09 8/16/20


21:35 8/16/20


22:56 8/17/20


00:04


 


Glucose (Fingerstick)


 126 mg/dL


(70-99) 152 mg/dL


(70-99) 135 mg/dL


(70-99) 162 mg/dL


(70-99)


 


Test


 8/17/20


01:38 8/17/20


02:45 8/17/20


03:54 8/17/20


05:26


 


Glucose (Fingerstick)


 150 mg/dL


(70-99) 173 mg/dL


(70-99) 158 mg/dL


(70-99) 144 mg/dL


(70-99)


 


Test


 8/17/20


06:45 8/17/20


07:40 8/17/20


07:41 8/17/20


07:50


 


Glucose (Fingerstick)


 138 mg/dL


(70-99) 


 113 mg/dL


(70-99) 





 


White Blood Count


 


 21.8 x10^3/uL


(4.0-11.0) 


 





 


Red Blood Count


 


 4.48 x10^6/uL


(4.30-5.70) 


 





 


Hemoglobin


 


 12.2 g/dL


(13.0-17.5) 


 





 


Hematocrit


 


 36.7 %


(39.0-53.0) 


 





 


Mean Corpuscular Volume  82 fL ()   


 


Mean Corpuscular Hemoglobin  27 pg (25-35)   


 


Mean Corpuscular Hemoglobin


Concent 


 33 g/dL


(31-37) 


 





 


Red Cell Distribution Width


 


 14.8 %


(11.5-14.5) 


 





 


Platelet Count


 


 292 x10^3/uL


(140-400) 


 





 


Neutrophils (%) (Auto)  85 % (31-73)   


 


Lymphocytes (%) (Auto)  6 % (24-48)   


 


Monocytes (%) (Auto)  9 % (0-9)   


 


Eosinophils (%) (Auto)  0 % (0-3)   


 


Basophils (%) (Auto)  0 % (0-3)   


 


Neutrophils # (Auto)


 


 18.6 x10^3/uL


(1.8-7.7) 


 





 


Lymphocytes # (Auto)


 


 1.3 x10^3/uL


(1.0-4.8) 


 





 


Monocytes # (Auto)


 


 2.0 x10^3/uL


(0.0-1.1) 


 





 


Eosinophils # (Auto)


 


 0.0 x10^3/uL


(0.0-0.7) 


 





 


Basophils # (Auto)


 


 0.0 x10^3/uL


(0.0-0.2) 


 





 


Sodium Level


 


 139 mmol/L


(136-145) 


 





 


Potassium Level


 


 5.0 mmol/L


(3.5-5.1) 


 





 


Chloride Level


 


 108 mmol/L


() 


 





 


Carbon Dioxide Level


 


 26 mmol/L


(21-32) 


 





 


Anion Gap  5 (6-14)   


 


Blood Urea Nitrogen


 


 42 mg/dL


(8-26) 


 





 


Creatinine


 


 0.9 mg/dL


(0.7-1.3) 


 





 


Estimated GFR


(Cockcroft-Gault) 


 112.5 


 


 





 


BUN/Creatinine Ratio  47 (6-20)   


 


Glucose Level


 


 125 mg/dL


(70-99) 


 





 


Calcium Level


 


 8.2 mg/dL


(8.5-10.1) 


 





 


Total Bilirubin


 


 0.6 mg/dL


(0.2-1.0) 


 





 


Aspartate Amino Transf


(AST/SGOT) 


 18 U/L (15-37) 


 


 





 


Alanine Aminotransferase


(ALT/SGPT) 


 48 U/L (16-63) 


 


 





 


Alkaline Phosphatase


 


 59 U/L


() 


 





 


Total Protein


 


 5.4 g/dL


(6.4-8.2) 


 





 


Albumin


 


 1.6 g/dL


(3.4-5.0) 


 





 


Albumin/Globulin Ratio  0.4 (1.0-1.7)   


 


O2 Saturation    97 % (92-99) 


 


Arterial Blood pH


 


 


 


 7.47


(7.35-7.45)


 


Arterial Blood pCO2 at


Patient Temp 


 


 


 26 mmHg


(35-46)


 


Arterial Blood pO2 at Patient


Temp 


 


 


 91 mmHg


()


 


Arterial Blood HCO3


 


 


 


 19 mmol/L


(21-28)


 


Arterial Blood Base Excess


 


 


 


 -4 mmol/L


(-3-3)


 


FiO2    50 


 


Test


 8/17/20


08:45 8/17/20


10:19 


 





 


Glucose (Fingerstick)


 138 mg/dL


(70-99) 152 mg/dL


(70-99) 


 











Laboratory Tests








Test


 8/16/20


11:54 8/16/20


12:57 8/16/20


14:19 8/16/20


15:25


 


Glucose (Fingerstick)


 135 mg/dL


(70-99) 152 mg/dL


(70-99) 151 mg/dL


(70-99) 139 mg/dL


(70-99)


 


Test


 8/16/20


17:29 8/16/20


18:31 8/16/20


20:09 8/16/20


21:35


 


Glucose (Fingerstick)


 109 mg/dL


(70-99) 106 mg/dL


(70-99) 126 mg/dL


(70-99) 152 mg/dL


(70-99)


 


Test


 8/16/20


22:56 8/17/20


00:04 8/17/20


01:38 8/17/20


02:45


 


Glucose (Fingerstick)


 135 mg/dL


(70-99) 162 mg/dL


(70-99) 150 mg/dL


(70-99) 173 mg/dL


(70-99)


 


Test


 8/17/20


03:54 8/17/20


05:26 8/17/20


06:45 8/17/20


07:40


 


Glucose (Fingerstick)


 158 mg/dL


(70-99) 144 mg/dL


(70-99) 138 mg/dL


(70-99) 





 


White Blood Count


 


 


 


 21.8 x10^3/uL


(4.0-11.0)


 


Red Blood Count


 


 


 


 4.48 x10^6/uL


(4.30-5.70)


 


Hemoglobin


 


 


 


 12.2 g/dL


(13.0-17.5)


 


Hematocrit


 


 


 


 36.7 %


(39.0-53.0)


 


Mean Corpuscular Volume    82 fL () 


 


Mean Corpuscular Hemoglobin    27 pg (25-35) 


 


Mean Corpuscular Hemoglobin


Concent 


 


 


 33 g/dL


(31-37)


 


Red Cell Distribution Width


 


 


 


 14.8 %


(11.5-14.5)


 


Platelet Count


 


 


 


 292 x10^3/uL


(140-400)


 


Neutrophils (%) (Auto)    85 % (31-73) 


 


Lymphocytes (%) (Auto)    6 % (24-48) 


 


Monocytes (%) (Auto)    9 % (0-9) 


 


Eosinophils (%) (Auto)    0 % (0-3) 


 


Basophils (%) (Auto)    0 % (0-3) 


 


Neutrophils # (Auto)


 


 


 


 18.6 x10^3/uL


(1.8-7.7)


 


Lymphocytes # (Auto)


 


 


 


 1.3 x10^3/uL


(1.0-4.8)


 


Monocytes # (Auto)


 


 


 


 2.0 x10^3/uL


(0.0-1.1)


 


Eosinophils # (Auto)


 


 


 


 0.0 x10^3/uL


(0.0-0.7)


 


Basophils # (Auto)


 


 


 


 0.0 x10^3/uL


(0.0-0.2)


 


Sodium Level


 


 


 


 139 mmol/L


(136-145)


 


Potassium Level


 


 


 


 5.0 mmol/L


(3.5-5.1)


 


Chloride Level


 


 


 


 108 mmol/L


()


 


Carbon Dioxide Level


 


 


 


 26 mmol/L


(21-32)


 


Anion Gap    5 (6-14) 


 


Blood Urea Nitrogen


 


 


 


 42 mg/dL


(8-26)


 


Creatinine


 


 


 


 0.9 mg/dL


(0.7-1.3)


 


Estimated GFR


(Cockcroft-Gault) 


 


 


 112.5 





 


BUN/Creatinine Ratio    47 (6-20) 


 


Glucose Level


 


 


 


 125 mg/dL


(70-99)


 


Calcium Level


 


 


 


 8.2 mg/dL


(8.5-10.1)


 


Total Bilirubin


 


 


 


 0.6 mg/dL


(0.2-1.0)


 


Aspartate Amino Transf


(AST/SGOT) 


 


 


 18 U/L (15-37) 





 


Alanine Aminotransferase


(ALT/SGPT) 


 


 


 48 U/L (16-63) 





 


Alkaline Phosphatase


 


 


 


 59 U/L


()


 


Total Protein


 


 


 


 5.4 g/dL


(6.4-8.2)


 


Albumin


 


 


 


 1.6 g/dL


(3.4-5.0)


 


Albumin/Globulin Ratio    0.4 (1.0-1.7) 


 


Test


 8/17/20


07:41 8/17/20


07:50 8/17/20


08:45 8/17/20


10:19


 


Glucose (Fingerstick)


 113 mg/dL


(70-99) 


 138 mg/dL


(70-99) 152 mg/dL


(70-99)


 


O2 Saturation  97 % (92-99)   


 


Arterial Blood pH


 


 7.47


(7.35-7.45) 


 





 


Arterial Blood pCO2 at


Patient Temp 


 26 mmHg


(35-46) 


 





 


Arterial Blood pO2 at Patient


Temp 


 91 mmHg


() 


 





 


Arterial Blood HCO3


 


 19 mmol/L


(21-28) 


 





 


Arterial Blood Base Excess


 


 -4 mmol/L


(-3-3) 


 





 


FiO2  50   








Medications





Active Scripts








 Medications  Dose


 Route/Sig


 Max Daily Dose Days Date Category


 


 Levemir (Insulin


 Detemir) 100


 Unit/1 Ml Vial  65 Unit


 SQ BID


   8/4/20 Reported


 


 Novolog (Insulin


 Aspart) 100


 Unit/1 Ml Vial  34 Unit


 SQ TID


   8/4/20 Reported


 


 Hydrochlorothiazide


 Tablet


  (Hydrochlorothiazide)


 12.5 Mg Tablet  12.5 Mg


 PO DAILY


   3/15/20 Rx








Comments


cxr reviewed 8/17


mild improvement in infilt








CT chest reviewed 


 


1.  Essentially nondiagnostic study for evaluation of pulmonary embolism 


due to contrast bolus timing. If persistent clinical concern, repeat CT 


angiogram or VQ scan can better assess.


2.  Multifocal ground glass opacities bilaterally predominantly within the


left upper and lower lobes, concerning for infection such as viral 


pneumonia, ARDS or asymmetric pulmonary edema. Recommend follow-up to 


ensure resolution.





Impression


.


1.  Acute hypoxic respiratory failure secondary to COVID-19 pneumonia and ALI.--

 intubated 8/7, slowly improving oxygenation.


2.  Abnormal CT chest consistent with pneumonia with ground glass opacities


bilaterally, typical finding with COVID-19 pneumonia


3.  Underlying morbid obesity. ? sherrell


4.  Diabetes.


5.  Hypertension.


6.  MIAN, Pre-renal azotemia , likely contributed by steroid effect.


7. Hypertension-- improved 


8. fever, improving


9. morbid obesity, prob sherrell





Plan


.


cont vent support, setting reviewed, titrate fio2 to keep sat 92-94%, wean Fi02 

to 40% and PEEP to 7, ABG reviewed, 


start weaning versed to off


anticipate CPAP trial in 24-48 hrs once awake.


abx per ID, 


Continue IV steroids , taper today


Monitor renal function 


s/p convalescent plasma, remdesivir, follow clinical course 


HTN per PCP-- off cardene gtt


DM per PCP -- on insulin gtt


enteral nutrition, tolerating well


DVT/GI PPX 


Discussed with RN and RT








remains critically ill / d/w sister in detail


cct 30 min wo AZAM Lundberg MD                 Aug 17, 2020 10:56

## 2020-08-17 NOTE — NUR
SS following up with discharge planning. SS reviewed pt chart and discussed with pt RN. Pt 
remains on the vent at this time. COVID19 positive. Pt now day 13 in the hospital COVID19 
positive. SS contacted pt's sister and discussed LTACH. Pt's sister agreeable if necessary. 
Pt's sister reported that she had no preference of company and would discuss with her other 
sisters. SS phoned and faxed referral to [facility], [phone]; fax 
[phone]. SS will continue to follow for discharge planning.

## 2020-08-17 NOTE — RAD
CHEST AP ONLY 

 

INDICATION: Reason: COVID + / vent / Spl. Instructions:  / History: . 

 

COMPARISON STUDY: 8/14/2020.

 

FINDINGS:

 

Life Support Devices: Stable endotracheal tube, enteric tube, right IJ 

central venous catheter.

 

Lungs: Low lung volume. Improving but persistent patchy bilateral 

opacities.

 

Pleura: Stable small left pleural effusion.

 

Heart and Mediastinum: Stable cardiomediastinal silhouette and great 

vessels. 

 

IMPRESSION:  

1. Stable life support devices.

2. Improving but persistent patchy bilateral opacities.

3. Stable small left pleural effusion.

 

Electronically signed by: To Gibson MD (8/17/2020 9:28 AM) [id number]

## 2020-08-17 NOTE — PDOC
[facility] [facility] PROGRESS NOTE


Date of Service


DOS:


DATE: 8/17/20 


TIME: 13:20





Chief Complaint


Chief Complaint


Acute hypoxic respiratory failure secondary to COVID-19 pneumonia. s/p 

intubation, 8/7


Bilateral pulmonary infiltrates - Abnormal CT chest consistent with pneumonia 

with ground glass opacities bilaterally, poor contrast, pulmonary embolism could

not be ruled out, but he has low clinical suspicion for pulmonary embolism and 

as such no further investigation is needed.


Underlying morbid obesity.


Diabetes.


Hypertension.


Sepsis - Fever and leukocytosis. Allergy to Zosyn.





History of Present Illness


History of Present Illness


8/17/2020


Patient seen and examined in the COVID-19 ICU


He remains on the vent


AC/20/6 100/45% with 7 of PEEP he is satting 97% 


Chart reviewed


Discussed with RN


He remains critically











8/16/2020


Patient seen and examined in the COVID-19 ICU


He is on 50% FiO2 on the vent


AC/26/6 100/50% with 7 of PEEP


Chart reviewed


Discussed with RN


He remains critically ill although blood gases are starting to improve a little











8/15/2020


Patient seen and examined in the COVID-19 ICU


He remains intubated on assist control 26 650% FiO2 with 8 of PEEP


Discussed with RN


Chart reviewed


Patient has Hopkins to bedside drainage and mitts for patient safety


Still very critically ill








8/14/2020


Patient is seen and examined in ICU


Patient is intubated, on vent AC/26/600/50% FiO2 with 8 PEEP and sedated on 

Precedex, Versed and Fentanyl


Hopkins BSD


Discussed with RN


Charts reviewed





8/13/2020


Patient is seen and examined in ICU


Patient is intubated, on vent AC/26/600/50% FiO2 with 8 PEEP and sedated on 

Precedex, Versed and Fentanyl


Glycemic control obtained


Discussed with RN


Charts reviewed





8/12/2020


Patient is seen and examined in ICU


Patient is intubated, on vent AC/26/600/45% FiO2 with 8 PEEP and sedated on 

Precedex, Versed and Fentanyl


OG Tube at 70cc/hr


Discussed with RN


Charts reviewed








08/11/2020


Patient is seen and examined in ICU


Patient is intubated, on vent AC/26/600/70% FiO2 and sedated on Precedex, Versed

and Fentanyl


Discussed with RN


Charts reviewed





08/10/2020


Patient is seen and examined in ICU


Patient is intubated, on vent AC/26/600/55% FiO2 and sedated on Precedex, Versed

and Fentanyl


Received plasma and Remdesivir


Patient had some fever


Discussed with RN


Charts reviewed





Mr Dior is a 41-year-old morbidly obese male with a BMI of 51, DM2, HTN, 

diabetic foot ulcers who was brought into the hospital complaining of shortness 

of breath for 5 days prior to admit.  He was tested positive for SARS-CoV-2 

(COVID 19).  He had a cough, which has been nonproductive.  No headaches, no 

nausea, vomiting, no diarrhea, no dysuria, no focal weakness. Required 2L NCO2 

to maintain O2 saturations > 88%. He had CTA chest nondiagnostic for pulmonary 

embolism, but with multifocal ground glass opacities bilaterally predominantly 

in the left upper and lower lobes and concerning for COVID pneumonia. His T-max 

is 100.2.  Pulm and ID consulted, admitted for further care.





8/5: On 4L NCO2, asking to leave the hospital.


8/6: Resting on 15L NCO2, trying to take off O2. Transitioned to 35% vapotherm


8/7: Transferred to ICU for Vapotherm. ABG 63 on 90%.  Glucose in the 300s bashir

pite high dosing of insulin. No abdominal pain. S/p convalescent FFP


8/8: Afebrile. Seen on vent. He was intubated 8/7, on vent, sedated on versed, 

fentanyl, precedex, small ett secretion, on peep 10, fio2 60%.  Had a right IJ 

placed by anesthesia had bleeding for 15 to 20 minutes after placement.  Labs 

with WBC 15.7, Hb 12, platelets 255, , K5, BUN 58, CR 1.6, glucose 132, p

hosphorus 5.7.





Febrile 101.1 F.  Seen on vent AC mode tidal volume 600 FiO2 40%, PEEP of 9.  

Glucose trending downward.  Started on tube feeds.  Still with good urine 

output.





Plan:


Add Carbapenem per ID. +/- zyvox


Cont ICU, critically ill





Vitals/I&O


Vitals/I&O:





                                   Vital Signs








  Date Time  Temp Pulse Resp B/P (MAP) Pulse Ox O2 Delivery O2 Flow Rate FiO2


 


8/17/20 11:52     98 Ventilator  


 


8/17/20 11:00  73 20 126/67 (86)    


 


8/17/20 08:00 99.5       





 99.5       


 


8/17/20 06:16       40.0 














                                    I & O   


 


 8/16/20 8/16/20 8/17/20





 15:00 23:00 07:00


 


Intake Total 514 ml 1856.9 ml 1465 ml


 


Output Total 740 ml 975 ml 900 ml


 


Balance -226 ml 881.9 ml 565 ml











Physical Exam


Physical Exam:


GENERAL: Propped up in bed, orally intubated and sedated. + mitts 


HEENT:  Pupils equal. ETT and OGT in place 


NECK:  Supple


LUNGS:  Diminished aeration in bases 


HEART:  S1, S2 regular.


ABDOMEN:  Obese, bowel sounds present, soft


: Hopkins in place 


EXTREMITIES:  Trace edema, no cyanosis. SCDs bilaterally. 


SKIN:  No signs of rash. 


NEUROLOGIC:  Sedated


RIJ (8/7) without signs of complications


General:  Other (intubated and sedated)


Heart:  Regular rate, Normal S1, Normal S2, No murmurs


Abdomen:  Normal bowel sounds, No masses


Extremities:  Normal pulses, No tenderness/swelling


Skin:  No significant lesion





Labs


Labs:





Laboratory Tests








Test


 8/16/20


14:19 8/16/20


15:25 8/16/20


17:29 8/16/20


18:31


 


Glucose (Fingerstick)


 151 mg/dL


(70-99) 139 mg/dL


(70-99) 109 mg/dL


(70-99) 106 mg/dL


(70-99)


 


Test


 8/16/20


20:09 8/16/20


21:35 8/16/20


22:56 8/17/20


00:04


 


Glucose (Fingerstick)


 126 mg/dL


(70-99) 152 mg/dL


(70-99) 135 mg/dL


(70-99) 162 mg/dL


(70-99)


 


Test


 8/17/20


01:38 8/17/20


02:45 8/17/20


03:54 8/17/20


05:26


 


Glucose (Fingerstick)


 150 mg/dL


(70-99) 173 mg/dL


(70-99) 158 mg/dL


(70-99) 144 mg/dL


(70-99)


 


Test


 8/17/20


06:45 8/17/20


07:40 8/17/20


07:41 8/17/20


07:50


 


Glucose (Fingerstick)


 138 mg/dL


(70-99) 


 113 mg/dL


(70-99) 





 


White Blood Count


 


 21.8 x10^3/uL


(4.0-11.0) 


 





 


Red Blood Count


 


 4.48 x10^6/uL


(4.30-5.70) 


 





 


Hemoglobin


 


 12.2 g/dL


(13.0-17.5) 


 





 


Hematocrit


 


 36.7 %


(39.0-53.0) 


 





 


Mean Corpuscular Volume  82 fL ()   


 


Mean Corpuscular Hemoglobin  27 pg (25-35)   


 


Mean Corpuscular Hemoglobin


Concent 


 33 g/dL


(31-37) 


 





 


Red Cell Distribution Width


 


 14.8 %


(11.5-14.5) 


 





 


Platelet Count


 


 292 x10^3/uL


(140-400) 


 





 


Neutrophils (%) (Auto)  85 % (31-73)   


 


Lymphocytes (%) (Auto)  6 % (24-48)   


 


Monocytes (%) (Auto)  9 % (0-9)   


 


Eosinophils (%) (Auto)  0 % (0-3)   


 


Basophils (%) (Auto)  0 % (0-3)   


 


Neutrophils # (Auto)


 


 18.6 x10^3/uL


(1.8-7.7) 


 





 


Lymphocytes # (Auto)


 


 1.3 x10^3/uL


(1.0-4.8) 


 





 


Monocytes # (Auto)


 


 2.0 x10^3/uL


(0.0-1.1) 


 





 


Eosinophils # (Auto)


 


 0.0 x10^3/uL


(0.0-0.7) 


 





 


Basophils # (Auto)


 


 0.0 x10^3/uL


(0.0-0.2) 


 





 


Sodium Level


 


 139 mmol/L


(136-145) 


 





 


Potassium Level


 


 5.0 mmol/L


(3.5-5.1) 


 





 


Chloride Level


 


 108 mmol/L


() 


 





 


Carbon Dioxide Level


 


 26 mmol/L


(21-32) 


 





 


Anion Gap  5 (6-14)   


 


Blood Urea Nitrogen


 


 42 mg/dL


(8-26) 


 





 


Creatinine


 


 0.9 mg/dL


(0.7-1.3) 


 





 


Estimated GFR


(Cockcroft-Gault) 


 112.5 


 


 





 


BUN/Creatinine Ratio  47 (6-20)   


 


Glucose Level


 


 125 mg/dL


(70-99) 


 





 


Calcium Level


 


 8.2 mg/dL


(8.5-10.1) 


 





 


Total Bilirubin


 


 0.6 mg/dL


(0.2-1.0) 


 





 


Aspartate Amino Transf


(AST/SGOT) 


 18 U/L (15-37) 


 


 





 


Alanine Aminotransferase


(ALT/SGPT) 


 48 U/L (16-63) 


 


 





 


Alkaline Phosphatase


 


 59 U/L


() 


 





 


Total Protein


 


 5.4 g/dL


(6.4-8.2) 


 





 


Albumin


 


 1.6 g/dL


(3.4-5.0) 


 





 


Albumin/Globulin Ratio  0.4 (1.0-1.7)   


 


O2 Saturation    97 % (92-99) 


 


Arterial Blood pH


 


 


 


 7.47


(7.35-7.45)


 


Arterial Blood pCO2 at


Patient Temp 


 


 


 26 mmHg


(35-46)


 


Arterial Blood pO2 at Patient


Temp 


 


 


 91 mmHg


()


 


Arterial Blood HCO3


 


 


 


 19 mmol/L


(21-28)


 


Arterial Blood Base Excess


 


 


 


 -4 mmol/L


(-3-3)


 


FiO2    50 


 


Test


 8/17/20


08:45 8/17/20


10:19 8/17/20


11:45 8/17/20


13:04


 


Glucose (Fingerstick)


 138 mg/dL


(70-99) 152 mg/dL


(70-99) 153 mg/dL


(70-99) 136 mg/dL


(70-99)











Assessment and Plan


Assessmemt and Plan


Problems


Medical Problems:


(1) COVID-19 virus detected


Status: Acute  





Acute hypoxic respiratory failure secondary to COVID-19 pneumonia. s/p 

intubation, 8/7


Bilateral pulmonary infiltrates - Abnormal CT chest consistent with pneumonia 

with ground glass opacities bilaterally, poor contrast, pulmonary embolism could

not be ruled out, but he has low clinical suspicion for pulmonary embolism and 

as such no further investigation is needed.


Underlying morbid obesity.


Diabetes.


Hypertension.


Sepsis - Fever and leukocytosis. Allergy to Zosyn.





Plan


Vent weaning


Precedex fentanyl and propofol


Home meds


Daily chest x-rays and ABGs


Appreciate subspecialist input


OG feeds


DVT prophylaxis


He received a full Covid-19 protocol including steroids antibiotics and plasma 

and Remdesivir


ICU monitoring


Prognosis guarded


He remains critically ill


Total time 32minutes





Comment


Review of Relevant


I have reviewed the following items joseph (where applicable) has been applied.


Medications:





Current Medications








 Medications


  (Trade)  Dose


 Ordered  Sig/Toni


 Route


 PRN Reason  Start Time


 Stop Time Status Last Admin


Dose Admin


 


 Insulin Human


 Lispro


  (HumaLOG)  34 units  TIDWMEALS


 SQ


   8/17/20 12:00


    8/17/20 13:06





 


 Insulin Glargine


  (Lantus Syringe)  65 unit  BID


 SQ


   8/17/20 12:00


    8/17/20 13:07














Justicifation of Admission Dx:


Justifications for Admission:


Justification of Admission Dx:  Yes


Respiratory Failure:  Non-Cardiac Pulm Edema











JAIRO BUSCH III DO           Aug 17, 2020 13:21

## 2020-08-18 NOTE — NUR
SS following up with discharge planning. SS reviewed pt chart and discussed with pt RN. Pt 
accepted at [facility] and insurance authorization received for [facility], 
[phone]; fax [phone]. Discharge orders received. Pt's sister agreeable to 
transfer. SS phoned and faxed discharge orders to [facility]. Pt will 
discharge today and go to [facility] via [facility] transport at 1600. Pt, pt's sister, and pt's RN 
notified.

## 2020-08-18 NOTE — NUR
Status quo. attempted sedation vacation w/o success. Extreme anxiety/tachypnea,HTN. 
Refocused  and re sedated w controlled respirs now. SW w [facility] for transfer to [facility] . Communication w sister Gypsy per DR Hardwick and Carolyn charge nurse. All personal 
belingings transfered per EMS( brown t  shirt,blue olesya shots + belt, underwear ,sox ,black 
tennis shoes,cell phone// glasses,papers. Report to RN at [facility] 1500. Pumps x2 w 4 
channels and meds minus fentanyl gtt along w transport . Will bring pumps back after patient 
settled at [facility] and take balloons,bath items and clothes back to him

## 2020-08-18 NOTE — PDOC
PULMONARY PROGRESS NOTES


DATE: 8/18/20 


TIME: 11:51


Subjective


intubated 8/7, on vent, small secretion, peep 8, 50% fi02


was down to 40%Fio2 but when sedation was off, became very agitated, restless


re sedated/ 50%FIO2


Vitals





Vital Signs








  Date Time  Temp Pulse Resp B/P (MAP) Pulse Ox O2 Delivery O2 Flow Rate FiO2


 


8/18/20 10:02  101  158/74    


 


8/18/20 08:05     97 Ventilator  


 


8/18/20 06:00   20     


 


8/18/20 04:00 98.8       





 98.8       








Comments


ros   unable to obtain  intubated   sedated


on vent, 


sedated


NC AT 


no paradoxical abd motion


no accessory muscle use 


rrr


no edema


no rash


Labs





Laboratory Tests








Test


 8/16/20


11:54 8/16/20


12:57 8/16/20


14:19 8/16/20


15:25


 


Glucose (Fingerstick)


 135 mg/dL


(70-99) 152 mg/dL


(70-99) 151 mg/dL


(70-99) 139 mg/dL


(70-99)


 


Test


 8/16/20


17:29 8/16/20


18:31 8/16/20


20:09 8/16/20


21:35


 


Glucose (Fingerstick)


 109 mg/dL


(70-99) 106 mg/dL


(70-99) 126 mg/dL


(70-99) 152 mg/dL


(70-99)


 


Test


 8/16/20


22:56 8/17/20


00:04 8/17/20


01:38 8/17/20


02:45


 


Glucose (Fingerstick)


 135 mg/dL


(70-99) 162 mg/dL


(70-99) 150 mg/dL


(70-99) 173 mg/dL


(70-99)


 


Test


 8/17/20


03:54 8/17/20


05:26 8/17/20


06:45 8/17/20


07:40


 


Glucose (Fingerstick)


 158 mg/dL


(70-99) 144 mg/dL


(70-99) 138 mg/dL


(70-99) 





 


White Blood Count


 


 


 


 21.8 x10^3/uL


(4.0-11.0)


 


Red Blood Count


 


 


 


 4.48 x10^6/uL


(4.30-5.70)


 


Hemoglobin


 


 


 


 12.2 g/dL


(13.0-17.5)


 


Hematocrit


 


 


 


 36.7 %


(39.0-53.0)


 


Mean Corpuscular Volume    82 fL () 


 


Mean Corpuscular Hemoglobin    27 pg (25-35) 


 


Mean Corpuscular Hemoglobin


Concent 


 


 


 33 g/dL


(31-37)


 


Red Cell Distribution Width


 


 


 


 14.8 %


(11.5-14.5)


 


Platelet Count


 


 


 


 292 x10^3/uL


(140-400)


 


Neutrophils (%) (Auto)    85 % (31-73) 


 


Lymphocytes (%) (Auto)    6 % (24-48) 


 


Monocytes (%) (Auto)    9 % (0-9) 


 


Eosinophils (%) (Auto)    0 % (0-3) 


 


Basophils (%) (Auto)    0 % (0-3) 


 


Neutrophils # (Auto)


 


 


 


 18.6 x10^3/uL


(1.8-7.7)


 


Lymphocytes # (Auto)


 


 


 


 1.3 x10^3/uL


(1.0-4.8)


 


Monocytes # (Auto)


 


 


 


 2.0 x10^3/uL


(0.0-1.1)


 


Eosinophils # (Auto)


 


 


 


 0.0 x10^3/uL


(0.0-0.7)


 


Basophils # (Auto)


 


 


 


 0.0 x10^3/uL


(0.0-0.2)


 


Sodium Level


 


 


 


 139 mmol/L


(136-145)


 


Potassium Level


 


 


 


 5.0 mmol/L


(3.5-5.1)


 


Chloride Level


 


 


 


 108 mmol/L


()


 


Carbon Dioxide Level


 


 


 


 26 mmol/L


(21-32)


 


Anion Gap    5 (6-14) 


 


Blood Urea Nitrogen


 


 


 


 42 mg/dL


(8-26)


 


Creatinine


 


 


 


 0.9 mg/dL


(0.7-1.3)


 


Estimated GFR


(Cockcroft-Gault) 


 


 


 112.5 





 


BUN/Creatinine Ratio    47 (6-20) 


 


Glucose Level


 


 


 


 125 mg/dL


(70-99)


 


Calcium Level


 


 


 


 8.2 mg/dL


(8.5-10.1)


 


Total Bilirubin


 


 


 


 0.6 mg/dL


(0.2-1.0)


 


Aspartate Amino Transf


(AST/SGOT) 


 


 


 18 U/L (15-37) 





 


Alanine Aminotransferase


(ALT/SGPT) 


 


 


 48 U/L (16-63) 





 


Alkaline Phosphatase


 


 


 


 59 U/L


()


 


Total Protein


 


 


 


 5.4 g/dL


(6.4-8.2)


 


Albumin


 


 


 


 1.6 g/dL


(3.4-5.0)


 


Albumin/Globulin Ratio    0.4 (1.0-1.7) 


 


Test


 8/17/20


07:41 8/17/20


07:50 8/17/20


08:45 8/17/20


10:19


 


Glucose (Fingerstick)


 113 mg/dL


(70-99) 


 138 mg/dL


(70-99) 152 mg/dL


(70-99)


 


O2 Saturation  97 % (92-99)   


 


Arterial Blood pH


 


 7.47


(7.35-7.45) 


 





 


Arterial Blood pCO2 at


Patient Temp 


 26 mmHg


(35-46) 


 





 


Arterial Blood pO2 at Patient


Temp 


 91 mmHg


() 


 





 


Arterial Blood HCO3


 


 19 mmol/L


(21-28) 


 





 


Arterial Blood Base Excess


 


 -4 mmol/L


(-3-3) 


 





 


FiO2  50   


 


Test


 8/17/20


11:45 8/17/20


13:04 8/17/20


15:42 8/17/20


17:42


 


Glucose (Fingerstick)


 153 mg/dL


(70-99) 136 mg/dL


(70-99) 173 mg/dL


(70-99) 165 mg/dL


(70-99)


 


Test


 8/17/20


21:39 8/18/20


04:25 8/18/20


08:00 





 


Glucose (Fingerstick)


 132 mg/dL


(70-99) 


 


 





 


White Blood Count


 


 33.2 x10^3/uL


(4.0-11.0) 


 





 


Red Blood Count


 


 4.62 x10^6/uL


(4.30-5.70) 


 





 


Hemoglobin


 


 12.1 g/dL


(13.0-17.5) 


 





 


Hematocrit


 


 38.1 %


(39.0-53.0) 


 





 


Mean Corpuscular Volume  83 fL ()   


 


Mean Corpuscular Hemoglobin  26 pg (25-35)   


 


Mean Corpuscular Hemoglobin


Concent 


 32 g/dL


(31-37) 


 





 


Red Cell Distribution Width


 


 14.9 %


(11.5-14.5) 


 





 


Platelet Count


 


 341 x10^3/uL


(140-400) 


 





 


Neutrophils (%) (Auto)  79 % (31-73)   


 


Lymphocytes (%) (Auto)  11 % (24-48)   


 


Monocytes (%) (Auto)  10 % (0-9)   


 


Eosinophils (%) (Auto)  0 % (0-3)   


 


Basophils (%) (Auto)  0 % (0-3)   


 


Neutrophils # (Auto)


 


 26.2 x10^3/uL


(1.8-7.7) 


 





 


Lymphocytes # (Auto)


 


 3.5 x10^3/uL


(1.0-4.8) 


 





 


Monocytes # (Auto)


 


 3.3 x10^3/uL


(0.0-1.1) 


 





 


Eosinophils # (Auto)


 


 0.0 x10^3/uL


(0.0-0.7) 


 





 


Basophils # (Auto)


 


 0.1 x10^3/uL


(0.0-0.2) 


 





 


O2 Saturation   90 % (92-99)  


 


Arterial Blood pH


 


 


 7.39


(7.35-7.45) 





 


Arterial Blood pCO2 at


Patient Temp 


 


 34 mmHg


(35-46) 





 


Arterial Blood pO2 at Patient


Temp 


 


 54 mmHg


() 





 


Arterial Blood HCO3


 


 


 20 mmol/L


(21-28) 





 


Arterial Blood Base Excess


 


 


 -4 mmol/L


(-3-3) 





 


FiO2   40%+7  








Laboratory Tests








Test


 8/17/20


13:04 8/17/20


15:42 8/17/20


17:42 8/17/20


21:39


 


Glucose (Fingerstick)


 136 mg/dL


(70-99) 173 mg/dL


(70-99) 165 mg/dL


(70-99) 132 mg/dL


(70-99)


 


Test


 8/18/20


04:25 8/18/20


08:00 


 





 


White Blood Count


 33.2 x10^3/uL


(4.0-11.0) 


 


 





 


Red Blood Count


 4.62 x10^6/uL


(4.30-5.70) 


 


 





 


Hemoglobin


 12.1 g/dL


(13.0-17.5) 


 


 





 


Hematocrit


 38.1 %


(39.0-53.0) 


 


 





 


Mean Corpuscular Volume 83 fL ()    


 


Mean Corpuscular Hemoglobin 26 pg (25-35)    


 


Mean Corpuscular Hemoglobin


Concent 32 g/dL


(31-37) 


 


 





 


Red Cell Distribution Width


 14.9 %


(11.5-14.5) 


 


 





 


Platelet Count


 341 x10^3/uL


(140-400) 


 


 





 


Neutrophils (%) (Auto) 79 % (31-73)    


 


Lymphocytes (%) (Auto) 11 % (24-48)    


 


Monocytes (%) (Auto) 10 % (0-9)    


 


Eosinophils (%) (Auto) 0 % (0-3)    


 


Basophils (%) (Auto) 0 % (0-3)    


 


Neutrophils # (Auto)


 26.2 x10^3/uL


(1.8-7.7) 


 


 





 


Lymphocytes # (Auto)


 3.5 x10^3/uL


(1.0-4.8) 


 


 





 


Monocytes # (Auto)


 3.3 x10^3/uL


(0.0-1.1) 


 


 





 


Eosinophils # (Auto)


 0.0 x10^3/uL


(0.0-0.7) 


 


 





 


Basophils # (Auto)


 0.1 x10^3/uL


(0.0-0.2) 


 


 





 


O2 Saturation  90 % (92-99)   


 


Arterial Blood pH


 


 7.39


(7.35-7.45) 


 





 


Arterial Blood pCO2 at


Patient Temp 


 34 mmHg


(35-46) 


 





 


Arterial Blood pO2 at Patient


Temp 


 54 mmHg


() 


 





 


Arterial Blood HCO3


 


 20 mmol/L


(21-28) 


 





 


Arterial Blood Base Excess


 


 -4 mmol/L


(-3-3) 


 





 


FiO2  40%+7   








Medications





Active Scripts








 Medications  Dose


 Route/Sig


 Max Daily Dose Days Date Category


 


 Levemir (Insulin


 Detemir) 100


 Unit/1 Ml Vial  65 Unit


 SQ BID


   8/4/20 Reported


 


 Novolog (Insulin


 Aspart) 100


 Unit/1 Ml Vial  34 Unit


 SQ TID


   8/4/20 Reported


 


 Hydrochlorothiazide


 Tablet


  (Hydrochlorothiazide)


 12.5 Mg Tablet  12.5 Mg


 PO DAILY


   3/15/20 Rx








Comments


cxr reviewed 8/17


mild improvement in infilt








CT chest reviewed 


 


1.  Essentially nondiagnostic study for evaluation of pulmonary embolism 


due to contrast bolus timing. If persistent clinical concern, repeat CT 


angiogram or VQ scan can better assess.


2.  Multifocal ground glass opacities bilaterally predominantly within the


left upper and lower lobes, concerning for infection such as viral 


pneumonia, ARDS or asymmetric pulmonary edema. Recommend follow-up to 


ensure resolution.





Impression


.


1.  Acute hypoxic respiratory failure secondary to COVID-19 pneumonia and ALI.--

 intubated 8/7, slowly improving oxygenation.


2.  Abnormal CT chest consistent with pneumonia with ground glass opacities


bilaterally, typical finding with COVID-19 pneumonia


3.  Underlying morbid obesity. ? sherrell


4.  Diabetes.


5.  Hypertension.


6.  MIAN, Pre-renal azotemia , likely contributed by steroid effect.


7. Hypertension-- improved 


8. fever, improving


9. morbid obesity, prob sherrell





Plan


.


cont vent support, setting reviewed, titrate fio2 to keep sat 92-94%, wean Fi02 

to 50% and PEEP  7, ABG reviewed,  was down to 40%Fio2 but when sedation was 

off, became very agitated, restless


re sedated/ 50%FIO2


anticipate CPAP trial in -48 hrs once awake.and not agitated


abx per ID, 


Continue IV steroids , taper 


Monitor renal function 


s/p convalescent plasma, remdesivir, follow clinical course 


HTN per PCP-- off cardene gtt


DM per PCP -- on insulin gtt


enteral nutrition, tolerating well


DVT/GI PPX 


Discussed with RN and RT








remains critically ill / d/w sister in detail. He will still take a while to 

come off. Cannot do trach w/o COVID neg. [facility] I am ok with 

transfer with ET tube. She agrees


cct 30 min wo overlap











AZAM REAGAN MD                 Aug 18, 2020 11:57

## 2020-08-18 NOTE — PDOC
[facility] [facility] PROGRESS NOTE


Date of Service


DOS:


DATE: 8/18/20 


TIME: 12:22





Chief Complaint


Chief Complaint


Acute hypoxic respiratory failure secondary to COVID-19 pneumonia. s/p 

intubation, 8/7


Bilateral pulmonary infiltrates - Abnormal CT chest consistent with pneumonia 

with ground glass opacities bilaterally, poor contrast, pulmonary embolism could

not be ruled out, but he has low clinical suspicion for pulmonary embolism and 

as such no further investigation is needed.


Underlying morbid obesity.


Diabetes.


Hypertension.


Sepsis - Fever and leukocytosis. Allergy to Zosyn.





History of Present Illness


History of Present Illness


8/18/2022


Patient seen and examined in the COVID-19 ICU


Discussed with case management


Discussed with RN


Chart reviewed


He is still on the vent


Assist-control/20/6 100/50% with 7 of PEEP and he is satting 90%


We are hopeful he might be able to go to a long-term acute care facility this 

afternoon








8/17/2020


Patient seen and examined in the COVID-19 ICU


He remains on the vent


AC/20/6 100/45% with 7 of PEEP he is satting 97% 


Chart reviewed


Discussed with RN


He remains critically











8/16/2020


Patient seen and examined in the COVID-19 ICU


He is on 50% FiO2 on the vent


AC/26/6 100/50% with 7 of PEEP


Chart reviewed


Discussed with RN


He remains critically ill although blood gases are starting to improve a little











8/15/2020


Patient seen and examined in the COVID-19 ICU


He remains intubated on assist control 26 650% FiO2 with 8 of PEEP


Discussed with RN


Chart reviewed


Patient has Hopkins to bedside drainage and mitts for patient safety


Still very critically ill








8/14/2020


Patient is seen and examined in ICU


Patient is intubated, on vent AC/26/600/50% FiO2 with 8 PEEP and sedated on 

Precedex, Versed and Fentanyl


Hopkins BSD


Discussed with RN


Charts reviewed





8/13/2020


Patient is seen and examined in ICU


Patient is intubated, on vent AC/26/600/50% FiO2 with 8 PEEP and sedated on 

Precedex, Versed and Fentanyl


Glycemic control obtained


Discussed with RN


Charts reviewed





8/12/2020


Patient is seen and examined in ICU


Patient is intubated, on vent AC/26/600/45% FiO2 with 8 PEEP and sedated on 

Precedex, Versed and Fentanyl


OG Tube at 70cc/hr


Discussed with RN


Charts reviewed








08/11/2020


Patient is seen and examined in ICU


Patient is intubated, on vent AC/26/600/70% FiO2 and sedated on Precedex, Versed

and Fentanyl


Discussed with RN


Charts reviewed





08/10/2020


Patient is seen and examined in ICU


Patient is intubated, on vent AC/26/600/55% FiO2 and sedated on Precedex, Versed

and Fentanyl


Received plasma and Remdesivir


Patient had some fever


Discussed with RN


Charts reviewed





Mr Dior is a 41-year-old morbidly obese male with a BMI of 51, DM2, HTN, diabe

tic foot ulcers who was brought into the hospital complaining of shortness of 

breath for 5 days prior to admit.  He was tested positive for SARS-CoV-2 (COVID 

19).  He had a cough, which has been nonproductive.  No headaches, no nausea, 

vomiting, no diarrhea, no dysuria, no focal weakness. Required 2L NCO2 to 

maintain O2 saturations > 88%. He had CTA chest nondiagnostic for pulmonary 

embolism, but with multifocal ground glass opacities bilaterally predominantly 

in the left upper and lower lobes and concerning for COVID pneumonia. His T-max 

is 100.2.  Pulm and ID consulted, admitted for further care.





8/5: On 4L NCO2, asking to leave the hospital.


8/6: Resting on 15L NCO2, trying to take off O2. Transitioned to 35% vapotherm


8/7: Transferred to ICU for Vapotherm. ABG 63 on 90%.  Glucose in the 300s 

despite high dosing of insulin. No abdominal pain. S/p convalescent FFP


8/8: Afebrile. Seen on vent. He was intubated 8/7, on vent, sedated on versed, 

fentanyl, precedex, small ett secretion, on peep 10, fio2 60%.  Had a right IJ 

placed by anesthesia had bleeding for 15 to 20 minutes after placement.  Labs 

with WBC 15.7, Hb 12, platelets 255, , K5, BUN 58, CR 1.6, glucose 132, 

phosphorus 5.7.





Febrile 101.1 F.  Seen on vent AC mode tidal volume 600 FiO2 40%, PEEP of 9.  

Glucose trending downward.  Started on tube feeds.  Still with good urine 

output.





Plan:


Add Carbapenem per ID. +/- zyvox


Cont ICU, critically ill





Vitals/I&O


Vitals/I&O:





                                   Vital Signs








  Date Time  Temp Pulse Resp B/P (MAP) Pulse Ox O2 Delivery O2 Flow Rate FiO2


 


8/18/20 12:00 97.9 69 20 105/79 (88) 93 Ventilator  





 97.9       














                                    I & O   


 


 8/17/20 8/17/20 8/18/20





 15:00 23:00 07:00


 


Intake Total 2208.5 ml 1834.7 ml 974 ml


 


Output Total 1005 ml 875 ml 1275 ml


 


Balance 1203.5 ml 959.7 ml -301 ml











Physical Exam


Physical Exam:


GENERAL: Propped up in bed, orally intubated and sedated. + mitts 


HEENT:  Pupils equal. ETT and OGT in place 


NECK:  Supple


LUNGS:  Diminished aeration in bases 


HEART:  S1, S2 regular.


ABDOMEN:  Obese, bowel sounds present, soft


: Hopkins in place 


EXTREMITIES:  Trace edema, no cyanosis. SCDs bilaterally. 


SKIN:  No signs of rash. 


NEUROLOGIC:  Sedated


RIJ (8/7) without signs of complications


General:  Other (intubated and sedated)


Heart:  Regular rate, Normal S1, Normal S2, No murmurs


Lungs:  Crackles


Abdomen:  Normal bowel sounds, No masses


Extremities:  Normal pulses, No tenderness/swelling


Skin:  No significant lesion





Labs


Labs:





Laboratory Tests








Test


 8/17/20


13:04 8/17/20


15:42 8/17/20


17:42 8/17/20


21:39


 


Glucose (Fingerstick)


 136 mg/dL


(70-99) 173 mg/dL


(70-99) 165 mg/dL


(70-99) 132 mg/dL


(70-99)


 


Test


 8/18/20


04:25 8/18/20


08:00 8/18/20


11:49 





 


White Blood Count


 33.2 x10^3/uL


(4.0-11.0) 


 


 





 


Red Blood Count


 4.62 x10^6/uL


(4.30-5.70) 


 


 





 


Hemoglobin


 12.1 g/dL


(13.0-17.5) 


 


 





 


Hematocrit


 38.1 %


(39.0-53.0) 


 


 





 


Mean Corpuscular Volume 83 fL ()    


 


Mean Corpuscular Hemoglobin 26 pg (25-35)    


 


Mean Corpuscular Hemoglobin


Concent 32 g/dL


(31-37) 


 


 





 


Red Cell Distribution Width


 14.9 %


(11.5-14.5) 


 


 





 


Platelet Count


 341 x10^3/uL


(140-400) 


 


 





 


Neutrophils (%) (Auto) 79 % (31-73)    


 


Lymphocytes (%) (Auto) 11 % (24-48)    


 


Monocytes (%) (Auto) 10 % (0-9)    


 


Eosinophils (%) (Auto) 0 % (0-3)    


 


Basophils (%) (Auto) 0 % (0-3)    


 


Neutrophils # (Auto)


 26.2 x10^3/uL


(1.8-7.7) 


 


 





 


Lymphocytes # (Auto)


 3.5 x10^3/uL


(1.0-4.8) 


 


 





 


Monocytes # (Auto)


 3.3 x10^3/uL


(0.0-1.1) 


 


 





 


Eosinophils # (Auto)


 0.0 x10^3/uL


(0.0-0.7) 


 


 





 


Basophils # (Auto)


 0.1 x10^3/uL


(0.0-0.2) 


 


 





 


O2 Saturation  90 % (92-99)   


 


Arterial Blood pH


 


 7.39


(7.35-7.45) 


 





 


Arterial Blood pCO2 at


Patient Temp 


 34 mmHg


(35-46) 


 





 


Arterial Blood pO2 at Patient


Temp 


 54 mmHg


() 


 





 


Arterial Blood HCO3


 


 20 mmol/L


(21-28) 


 





 


Arterial Blood Base Excess


 


 -4 mmol/L


(-3-3) 


 





 


FiO2  40%+7   


 


Glucose (Fingerstick)


 


 


 150 mg/dL


(70-99) 














Assessment and Plan


Assessmemt and Plan


Problems


Medical Problems:


(1) COVID-19 virus detected


Status: Acute  





Acute hypoxic respiratory failure secondary to COVID-19 pneumonia. s/p 

intubation, 8/7


Bilateral pulmonary infiltrates - Abnormal CT chest consistent with pneumonia 

with ground glass opacities bilaterally, poor contrast, pulmonary embolism could

not be ruled out, but he has low clinical suspicion for pulmonary embolism and 

as such no further investigation is needed.


Underlying morbid obesity.


Diabetes.


Hypertension.


Sepsis - Fever and leukocytosis. Allergy to Zosyn.





Plan


Probable discharge to long-term acute care [facility] afternoon [facility] 

for now continue the following:


Vent weaning


Precedex fentanyl and propofol


Home meds


Daily chest x-rays and ABGs


Appreciate subspecialist input


OG feeds


DVT prophylaxis


He received a full Covid-19 protocol including steroids antibiotics and plasma 

and Remdesivir


ICU monitoring


Prognosis guarded


He remains critically ill


Total time 31minutes





Comment


Review of Relevant


I have reviewed the following items joseph (where applicable) has been applied.


Medications:





Current Medications








 Medications


  (Trade)  Dose


 Ordered  Sig/Toni


 Route


 PRN Reason  Start Time


 Stop Time Status Last Admin


Dose Admin


 


 Methylprednisolone


 Sodium Succinate


  (SOLU-Medrol


 40MG VIAL)  40 mg  DAILY


 IV


   8/18/20 09:00


    8/18/20 09:00














Justicifation of Admission Dx:


Justifications for Admission:


Justification of Admission Dx:  Yes


Respiratory Failure:  Non-Cardiac Pulm Edema











JAIRO BUSCH III DO           Aug 18, 2020 12:23

## 2020-08-18 NOTE — PDOC
Infectious Disease Note


Subjective


Subjective


Pt remains intubated/sedated


FiO2 at 40% PEEP 7





ROS


ROS


unable to obtain





Vital Sign


Vital Signs





Vital Signs








  Date Time  Temp Pulse Resp B/P (MAP) Pulse Ox O2 Delivery O2 Flow Rate FiO2


 


8/18/20 06:00  114 20 176/58 (97) 99 Ventilator  


 


8/18/20 04:00 98.8       





 98.8       











Physical Exam


PHYSICAL EXAM


GENERAL: Propped up in bed, orally intubated and sedated. + mitts 


HEENT:  Pupils equal. ETT and OGT in place 


NECK:  Supple


LUNGS:  Diminished aeration in bases 


HEART:  S1, S2 regular.


ABDOMEN:  Obese, bowel sounds present, soft


: Hopkins in place 


EXTREMITIES:  Trace edema, no cyanosis. SCDs bilaterally. 


SKIN:  No signs of rash. 


NEUROLOGIC:  Sedated


RIJ (8/7) without signs of complications





Labs


Lab





Laboratory Tests








Test


 8/17/20


07:40 8/17/20


07:41 8/17/20


07:50 8/17/20


08:45


 


White Blood Count


 21.8 x10^3/uL


(4.0-11.0) 


 


 





 


Red Blood Count


 4.48 x10^6/uL


(4.30-5.70) 


 


 





 


Hemoglobin


 12.2 g/dL


(13.0-17.5) 


 


 





 


Hematocrit


 36.7 %


(39.0-53.0) 


 


 





 


Mean Corpuscular Volume 82 fL ()    


 


Mean Corpuscular Hemoglobin 27 pg (25-35)    


 


Mean Corpuscular Hemoglobin


Concent 33 g/dL


(31-37) 


 


 





 


Red Cell Distribution Width


 14.8 %


(11.5-14.5) 


 


 





 


Platelet Count


 292 x10^3/uL


(140-400) 


 


 





 


Neutrophils (%) (Auto) 85 % (31-73)    


 


Lymphocytes (%) (Auto) 6 % (24-48)    


 


Monocytes (%) (Auto) 9 % (0-9)    


 


Eosinophils (%) (Auto) 0 % (0-3)    


 


Basophils (%) (Auto) 0 % (0-3)    


 


Neutrophils # (Auto)


 18.6 x10^3/uL


(1.8-7.7) 


 


 





 


Lymphocytes # (Auto)


 1.3 x10^3/uL


(1.0-4.8) 


 


 





 


Monocytes # (Auto)


 2.0 x10^3/uL


(0.0-1.1) 


 


 





 


Eosinophils # (Auto)


 0.0 x10^3/uL


(0.0-0.7) 


 


 





 


Basophils # (Auto)


 0.0 x10^3/uL


(0.0-0.2) 


 


 





 


Sodium Level


 139 mmol/L


(136-145) 


 


 





 


Potassium Level


 5.0 mmol/L


(3.5-5.1) 


 


 





 


Chloride Level


 108 mmol/L


() 


 


 





 


Carbon Dioxide Level


 26 mmol/L


(21-32) 


 


 





 


Anion Gap 5 (6-14)    


 


Blood Urea Nitrogen


 42 mg/dL


(8-26) 


 


 





 


Creatinine


 0.9 mg/dL


(0.7-1.3) 


 


 





 


Estimated GFR


(Cockcroft-Gault) 112.5 


 


 


 





 


BUN/Creatinine Ratio 47 (6-20)    


 


Glucose Level


 125 mg/dL


(70-99) 


 


 





 


Calcium Level


 8.2 mg/dL


(8.5-10.1) 


 


 





 


Total Bilirubin


 0.6 mg/dL


(0.2-1.0) 


 


 





 


Aspartate Amino Transf


(AST/SGOT) 18 U/L (15-37) 


 


 


 





 


Alanine Aminotransferase


(ALT/SGPT) 48 U/L (16-63) 


 


 


 





 


Alkaline Phosphatase


 59 U/L


() 


 


 





 


Total Protein


 5.4 g/dL


(6.4-8.2) 


 


 





 


Albumin


 1.6 g/dL


(3.4-5.0) 


 


 





 


Albumin/Globulin Ratio 0.4 (1.0-1.7)    


 


Glucose (Fingerstick)


 


 113 mg/dL


(70-99) 


 138 mg/dL


(70-99)


 


O2 Saturation   97 % (92-99)  


 


Arterial Blood pH


 


 


 7.47


(7.35-7.45) 





 


Arterial Blood pCO2 at


Patient Temp 


 


 26 mmHg


(35-46) 





 


Arterial Blood pO2 at Patient


Temp 


 


 91 mmHg


() 





 


Arterial Blood HCO3


 


 


 19 mmol/L


(21-28) 





 


Arterial Blood Base Excess


 


 


 -4 mmol/L


(-3-3) 





 


FiO2   50  


 


Test


 8/17/20


10:19 8/17/20


11:45 8/17/20


13:04 8/17/20


15:42


 


Glucose (Fingerstick)


 152 mg/dL


(70-99) 153 mg/dL


(70-99) 136 mg/dL


(70-99) 173 mg/dL


(70-99)


 


Test


 8/17/20


17:42 8/17/20


21:39 8/18/20


04:25 





 


Glucose (Fingerstick)


 165 mg/dL


(70-99) 132 mg/dL


(70-99) 


 





 


White Blood Count


 


 


 33.2 x10^3/uL


(4.0-11.0) 





 


Red Blood Count


 


 


 4.62 x10^6/uL


(4.30-5.70) 





 


Hemoglobin


 


 


 12.1 g/dL


(13.0-17.5) 





 


Hematocrit


 


 


 38.1 %


(39.0-53.0) 





 


Mean Corpuscular Volume   83 fL ()  


 


Mean Corpuscular Hemoglobin   26 pg (25-35)  


 


Mean Corpuscular Hemoglobin


Concent 


 


 32 g/dL


(31-37) 





 


Red Cell Distribution Width


 


 


 14.9 %


(11.5-14.5) 





 


Platelet Count


 


 


 341 x10^3/uL


(140-400) 





 


Neutrophils (%) (Auto)   79 % (31-73)  


 


Lymphocytes (%) (Auto)   11 % (24-48)  


 


Monocytes (%) (Auto)   10 % (0-9)  


 


Eosinophils (%) (Auto)   0 % (0-3)  


 


Basophils (%) (Auto)   0 % (0-3)  


 


Neutrophils # (Auto)


 


 


 26.2 x10^3/uL


(1.8-7.7) 





 


Lymphocytes # (Auto)


 


 


 3.5 x10^3/uL


(1.0-4.8) 





 


Monocytes # (Auto)


 


 


 3.3 x10^3/uL


(0.0-1.1) 





 


Eosinophils # (Auto)


 


 


 0.0 x10^3/uL


(0.0-0.7) 





 


Basophils # (Auto)


 


 


 0.1 x10^3/uL


(0.0-0.2) 











Micro





IMPRESSION:


 


Gaseous distention of the colon with a moderate amount of fecal material 


in the right colon.





Microbiology


8/9/20 Blood Culture - Final, Complete


         NO GROWTH AFTER 5 DAYS





Objective


Assessment


Fever resolved


COVID-19 positive, 7/29. s/p convalescent plasma and remdesivir,


Bilateral pulmonary infiltrates.


Ileus


Leukocytosis, on steroids, increased, could be reactive


Allergy to Zosyn. h/o hives and swelling. 


Acute respiratory failure s/p intubation, 8/7


Renal insufficiency  


Diabetes.  Poorly controlled


Hypertension.


Obesity.


Constipation





Plan


Plan of Care


Discont Merrem 8/9 - 8/17


s/p convalescent plasma and remdesivir 


Steroids per primary 


Consider IJ change soon


Maintain aspiration precautions


Airborne isolation for COVID-19


Discussed with nursing





Critically ill











YODIT BRUNER MD              Aug 18, 2020 07:05

## 2020-08-20 NOTE — DS
DATE OF DISCHARGE:  08/18/2020



ADMISSION DIAGNOSES:  Respiratory failure and COVID-19.



DISCHARGE DIAGNOSES:  Continued respiratory failure (he is on the ventilator, he

has gone to long-term acute care facility).



HOSPITAL COURSE:  The patient is a pleasant 41-year-old male who works as an

 and he is also, I believe, a .  Basically, he presented

with severe respiratory failure and COVID-19.  He does have risk factors.  He is

overweight and has diabetes.  We admitted the patient on the vent to the ICU. 

He did require high oxygen levels, but over the last 3 days, he got down to as

low as 45%, was hovering around 50% on the day of discharge.  We did have

Pulmonary following.  We treated him with steroids, beta agonist, vitamins,

remdesivir, plasma and overall he is slowly improving, but very sick and still

needs to be in the hospital for several weeks.  He is going to go to a long-term

acute care facility for vent weaning.



DISPOSITION:  Long-term acute care.



DISPOSITION:  [facility].



ACTIVITY:  As tolerated.



DIET:  We have OG feeds running at 70 mL an hour.



MEDICATIONS:  Please see the MRAD.  We will continue the same medications.  I

filled out.



TOTAL TIME:  34 minutes.

 



______________________________

JAIRO BUSCH DO



DR:  MIRIAM/madison  JOB#:  [id number] / [id number]

DD:  08/20/2020 11:41  DT:  08/20/2020 11:56

## 2025-05-28 NOTE — PDOC
PULMONARY PROGRESS NOTES


DATE: 8/13/20 


TIME: 09:29


Subjective


intubated 8/7, on vent, 


sedated on versed, fentanyl, precedex, a-febrile, 


no overnight concerns from nursing


Vitals





Vital Signs








  Date Time  Temp Pulse Resp B/P (MAP) Pulse Ox O2 Delivery O2 Flow Rate FiO2


 


8/13/20 08:15     92 Ventilator  


 


8/13/20 06:00  74 26 146/68 (94)    


 


8/13/20 04:00 98.4       





 98.4       


 


8/13/20 04:00       40.0 








Comments


ros   unable to obtain  intubated   sedated


on vent, 


sedated


no paradoxical abd motion


no audible wheezing


rrr


no edema


no rash


General:  Mild Distress


Labs





Laboratory Tests








Test


 8/11/20


11:20 8/11/20


13:47 8/11/20


17:35 8/11/20


23:42


 


Glucose (Fingerstick)


 398 mg/dL


(70-99) 389 mg/dL


(70-99) 388 mg/dL


(70-99) 320 mg/dL


(70-99)


 


Test


 8/12/20


05:47 8/12/20


06:00 8/12/20


08:00 8/12/20


08:23


 


Glucose (Fingerstick)


 375 mg/dL


(70-99) 


 


 345 mg/dL


(70-99)


 


Sodium Level


 


 141 mmol/L


(136-145) 


 





 


Potassium Level


 


 5.1 mmol/L


(3.5-5.1) 


 





 


Chloride Level


 


 108 mmol/L


() 


 





 


Carbon Dioxide Level


 


 26 mmol/L


(21-32) 


 





 


Anion Gap  7 (6-14)   


 


Blood Urea Nitrogen


 


 62 mg/dL


(8-26) 


 





 


Creatinine


 


 1.5 mg/dL


(0.7-1.3) 


 





 


Estimated GFR


(Cockcroft-Gault) 


 62.4 


 


 





 


BUN/Creatinine Ratio  41 (6-20)   


 


Glucose Level


 


 389 mg/dL


(70-99) 


 





 


Calcium Level


 


 7.6 mg/dL


(8.5-10.1) 


 





 


Total Bilirubin


 


 0.4 mg/dL


(0.2-1.0) 


 





 


Aspartate Amino Transf


(AST/SGOT) 


 8 U/L (15-37) 


 


 





 


Alanine Aminotransferase


(ALT/SGPT) 


 15 U/L (16-63) 


 


 





 


Alkaline Phosphatase


 


 82 U/L


() 


 





 


Total Protein


 


 6.3 g/dL


(6.4-8.2) 


 





 


Albumin


 


 1.6 g/dL


(3.4-5.0) 


 





 


Albumin/Globulin Ratio  0.3 (1.0-1.7)   


 


O2 Saturation   91 % (92-99)  


 


Arterial Blood pH


 


 


 7.43


(7.35-7.45) 





 


Arterial Blood pCO2 at


Patient Temp 


 


 34 mmHg


(35-46) 





 


Arterial Blood pO2 at Patient


Temp 


 


 63 mmHg


() 





 


Arterial Blood HCO3


 


 


 22 mmol/L


(21-28) 





 


Arterial Blood Base Excess


 


 


 -2 mmol/L


(-3-3) 





 


FiO2   45  


 


Test


 8/12/20


09:18 8/12/20


10:33 8/12/20


12:35 8/12/20


13:36


 


Glucose (Fingerstick)


 346 mg/dL


(70-99) 330 mg/dL


(70-99) 274 mg/dL


(70-99) 264 mg/dL


(70-99)


 


Test


 8/12/20


14:31 8/12/20


15:34 8/12/20


16:32 8/12/20


17:47


 


Glucose (Fingerstick)


 240 mg/dL


(70-99) 251 mg/dL


(70-99) 220 mg/dL


(70-99) 198 mg/dL


(70-99)


 


Test


 8/12/20


18:43 8/12/20


19:46 8/12/20


20:42 8/12/20


21:46


 


Glucose (Fingerstick)


 175 mg/dL


(70-99) 172 mg/dL


(70-99) 173 mg/dL


(70-99) 167 mg/dL


(70-99)


 


Test


 8/12/20


23:15 8/13/20


00:15 8/13/20


01:20 8/13/20


02:19


 


Glucose (Fingerstick)


 158 mg/dL


(70-99) 149 mg/dL


(70-99) 142 mg/dL


(70-99) 132 mg/dL


(70-99)


 


Test


 8/13/20


03:25 8/13/20


04:29 8/13/20


06:00 8/13/20


06:39


 


Glucose (Fingerstick)


 146 mg/dL


(70-99) 146 mg/dL


(70-99) 


 166 mg/dL


(70-99)


 


Sodium Level


 


 


 147 mmol/L


(136-145) 





 


Potassium Level


 


 


 4.8 mmol/L


(3.5-5.1) 





 


Chloride Level


 


 


 113 mmol/L


() 





 


Carbon Dioxide Level


 


 


 27 mmol/L


(21-32) 





 


Anion Gap   7 (6-14)  


 


Blood Urea Nitrogen


 


 


 60 mg/dL


(8-26) 





 


Creatinine


 


 


 1.1 mg/dL


(0.7-1.3) 





 


Estimated GFR


(Cockcroft-Gault) 


 


 89.3 


 





 


Glucose Level


 


 


 147 mg/dL


(70-99) 





 


Calcium Level


 


 


 8.1 mg/dL


(8.5-10.1) 





 


Test


 8/13/20


08:07 8/13/20


08:10 


 





 


Glucose (Fingerstick)


 137 mg/dL


(70-99) 


 


 





 


O2 Saturation  89 % (92-99)   


 


Arterial Blood pH


 


 7.42


(7.35-7.45) 


 





 


Arterial Blood pCO2 at


Patient Temp 


 33 mmHg


(35-46) 


 





 


Arterial Blood pO2 at Patient


Temp 


 57 mmHg


() 


 





 


Arterial Blood HCO3


 


 21 mmol/L


(21-28) 


 





 


Arterial Blood Base Excess


 


 -3 mmol/L


(-3-3) 


 





 


FiO2  45   








Laboratory Tests








Test


 8/12/20


10:33 8/12/20


12:35 8/12/20


13:36 8/12/20


14:31


 


Glucose (Fingerstick)


 330 mg/dL


(70-99) 274 mg/dL


(70-99) 264 mg/dL


(70-99) 240 mg/dL


(70-99)


 


Test


 8/12/20


15:34 8/12/20


16:32 8/12/20


17:47 8/12/20


18:43


 


Glucose (Fingerstick)


 251 mg/dL


(70-99) 220 mg/dL


(70-99) 198 mg/dL


(70-99) 175 mg/dL


(70-99)


 


Test


 8/12/20


19:46 8/12/20


20:42 8/12/20


21:46 8/12/20


23:15


 


Glucose (Fingerstick)


 172 mg/dL


(70-99) 173 mg/dL


(70-99) 167 mg/dL


(70-99) 158 mg/dL


(70-99)


 


Test


 8/13/20


00:15 8/13/20


01:20 8/13/20


02:19 8/13/20


03:25


 


Glucose (Fingerstick)


 149 mg/dL


(70-99) 142 mg/dL


(70-99) 132 mg/dL


(70-99) 146 mg/dL


(70-99)


 


Test


 8/13/20


04:29 8/13/20


06:00 8/13/20


06:39 8/13/20


08:07


 


Glucose (Fingerstick)


 146 mg/dL


(70-99) 


 166 mg/dL


(70-99) 137 mg/dL


(70-99)


 


Sodium Level


 


 147 mmol/L


(136-145) 


 





 


Potassium Level


 


 4.8 mmol/L


(3.5-5.1) 


 





 


Chloride Level


 


 113 mmol/L


() 


 





 


Carbon Dioxide Level


 


 27 mmol/L


(21-32) 


 





 


Anion Gap  7 (6-14)   


 


Blood Urea Nitrogen


 


 60 mg/dL


(8-26) 


 





 


Creatinine


 


 1.1 mg/dL


(0.7-1.3) 


 





 


Estimated GFR


(Cockcroft-Gault) 


 89.3 


 


 





 


Glucose Level


 


 147 mg/dL


(70-99) 


 





 


Calcium Level


 


 8.1 mg/dL


(8.5-10.1) 


 





 


Test


 8/13/20


08:10 


 


 





 


O2 Saturation 89 % (92-99)    


 


Arterial Blood pH


 7.42


(7.35-7.45) 


 


 





 


Arterial Blood pCO2 at


Patient Temp 33 mmHg


(35-46) 


 


 





 


Arterial Blood pO2 at Patient


Temp 57 mmHg


() 


 


 





 


Arterial Blood HCO3


 21 mmol/L


(21-28) 


 


 





 


Arterial Blood Base Excess


 -3 mmol/L


(-3-3) 


 


 





 


FiO2 45    








Medications





Active Scripts








 Medications  Dose


 Route/Sig


 Max Daily Dose Days Date Category


 


 Levemir (Insulin


 Detemir) 100


 Unit/1 Ml Vial  65 Unit


 SQ BID


   8/4/20 Reported


 


 Novolog (Insulin


 Aspart) 100


 Unit/1 Ml Vial  34 Unit


 SQ TID


   8/4/20 Reported


 


 Hydrochlorothiazide


 Tablet


  (Hydrochlorothiazide)


 12.5 Mg Tablet  12.5 Mg


 PO DAILY


   3/15/20 Rx








Comments


cxr 8/7 reviewed   b lat infilt l>r   ett ok





CT chest reviewed 


 


1.  Essentially nondiagnostic study for evaluation of pulmonary embolism 


due to contrast bolus timing. If persistent clinical concern, repeat CT 


angiogram or VQ scan can better assess.


2.  Multifocal ground glass opacities bilaterally predominantly within the


left upper and lower lobes, concerning for infection such as viral 


pneumonia, ARDS or asymmetric pulmonary edema. Recommend follow-up to 


ensure resolution.





Impression


.


1.  Acute hypoxic respiratory failure secondary to COVID-19 pneumonia and ALI.--

 intubated 8/7


2.  Abnormal CT chest consistent with pneumonia with ground glass opacities


bilaterally, typical finding with COVID-19 pneumonia


3.  Underlying morbid obesity. ? sherrell


4.  Diabetes.


5.  Hypertension.


6.  Pre-renal azotemia , likely contributed by steroid effect.


7. Hypertension 


8. fever-resolved





Plan


.


cont vent support, setting reviewed, titrate fio2 to keep sat 92-94%, ABG 

reviewed, Fi02 45% and PEEP 8


abx per ID, 


Continue IV steroids 


Monitor renal function 


s/p convalescent plasma, remdesivir, follow clinical course 


elevate hob


HTN per PCP-- on cardene gtt 


DM per PCP -- on insulin gtt


repeat D dimer


enteral nutrition, tolerating well


DVT/GI PPX 


Discussed with RN and RT


Avoid high PEEP unless needed.


remains critically ill 


cct 30 min wo overlap


addend: d/w Sister in detail about clinical condition and plan of care











AZAM REAGAN MD                 Aug 13, 2020 09:31 Wound Procedure Treatment Diabetic Ulcer Anterior;Left Toe D3, third    Performed by: Cony Jimenez RN  Authorized by: KIRIT Enamorado  Associated wounds:   Wound 12/30/24 Diabetic Ulcer Toe D3, third Anterior;Left    Wound cleansed with:  Soap and water   Applied primary dressing:  Acticoat and Silver   Applied secondary dressing:  Gauze   Dressing secured with:  Kerlix and Tape   Comments:  Acticoat 3   Patient identity confirmed:  Verbally with patient